# Patient Record
Sex: FEMALE | Race: BLACK OR AFRICAN AMERICAN | NOT HISPANIC OR LATINO | Employment: OTHER | URBAN - METROPOLITAN AREA
[De-identification: names, ages, dates, MRNs, and addresses within clinical notes are randomized per-mention and may not be internally consistent; named-entity substitution may affect disease eponyms.]

---

## 2017-01-09 ENCOUNTER — TELEPHONE (OUTPATIENT)
Dept: FAMILY MEDICINE CLINIC | Facility: CLINIC | Age: 62
End: 2017-01-09

## 2017-01-09 RX ORDER — VALACYCLOVIR HYDROCHLORIDE 1 G/1
1000 TABLET, FILM COATED ORAL 2 TIMES DAILY
Qty: 30 TABLET | Refills: 1 | Status: SHIPPED | OUTPATIENT
Start: 2017-01-09 | End: 2018-02-05 | Stop reason: SDUPTHER

## 2017-01-09 NOTE — TELEPHONE ENCOUNTER
----- Message from Uriah Ramirez MD sent at 1/9/2017 11:01 AM EST -----  Contact: [phone]  Medicine has been ordered  ----- Message -----     From: Oneyda See MA     Sent: 1/9/2017  10:59 AM       To: Uriah Ramirez MD        ----- Message -----     From: Radhika Gant     Sent: 1/9/2017  10:31 AM       To: Oneyda See MA    PT NEEDS REFILL OF ACYCLOEIR FOR FEVER BLISTERS. PLEASE CALL INTO [facility] AT [facility]    informed pt rx was sent to the pharmacy

## 2017-03-21 RX ORDER — MELOXICAM 15 MG/1
TABLET ORAL
Qty: 30 TABLET | Refills: 0 | Status: SHIPPED | OUTPATIENT
Start: 2017-03-21 | End: 2017-08-14 | Stop reason: SDUPTHER

## 2017-05-31 RX ORDER — ONDANSETRON 4 MG/1
TABLET, ORALLY DISINTEGRATING ORAL
Qty: 12 TABLET | Refills: 0 | Status: SHIPPED | OUTPATIENT
Start: 2017-05-31 | End: 2017-06-13

## 2017-06-05 ENCOUNTER — OFFICE VISIT (OUTPATIENT)
Dept: FAMILY MEDICINE CLINIC | Facility: CLINIC | Age: 62
End: 2017-06-05

## 2017-06-05 VITALS
WEIGHT: 185 LBS | HEIGHT: 67 IN | OXYGEN SATURATION: 98 % | TEMPERATURE: 97.9 F | BODY MASS INDEX: 29.03 KG/M2 | HEART RATE: 81 BPM | SYSTOLIC BLOOD PRESSURE: 118 MMHG | DIASTOLIC BLOOD PRESSURE: 82 MMHG

## 2017-06-05 DIAGNOSIS — R11.0 NAUSEA: ICD-10-CM

## 2017-06-05 DIAGNOSIS — R10.9 ABDOMINAL PAIN, UNSPECIFIED LOCATION: Primary | ICD-10-CM

## 2017-06-05 PROCEDURE — 99213 OFFICE O/P EST LOW 20 MIN: CPT | Performed by: FAMILY MEDICINE

## 2017-06-05 NOTE — PROGRESS NOTES
SUBJECTIVE:  The patient is a 62-year-old Afro-American female who is here primarily because she's having some abdominal comfort at her surgery site for a hernia.  She'll have occasional nausea and vomiting precipitated by smells.  She continues to have this.  This has been an ongoing problem for the recent onset of abdominal discomfort.  No melena or hematochezia.     PAST MEDICAL HISTORY:  Reviewed.    REVIEW OF SYSTEMS:  Please see above; 14 point ROS otherwise negative.      OBJECTIVE: Vitals signs are reviewed and are stable.    HEENT: PERRLA.    Neck:  Supple.    Lungs:  Clear.    Heart:  Regular rate and rhythm.    Abdomen:   Soft, normal tenderness is present at the lower end of the incision.  I'm not certain I detect a bulge though due to the patient's body habitus I cannot be sure of this.  Bowel sounds are present.  Extremities:  No cyanosis, clubbing or edema.      ASSESSMENT:    Possible incisional hernia    PLAN:  The patient's old records are not available from 2012.  I will try to retrieve these in the next 24 hours and figure out who her surgeon was since she does not remember.  She'll then be referred to a surgeon.  She will increase her Zofran from 4 mg 8 mg when she needs it for smells that precipitate her nausea and vomiting.  She'll let me know if this helps.  She is advised to go the emergency room if any problems.

## 2017-06-13 ENCOUNTER — OFFICE VISIT (OUTPATIENT)
Dept: SURGERY | Facility: CLINIC | Age: 62
End: 2017-06-13

## 2017-06-13 VITALS — WEIGHT: 184.6 LBS | HEART RATE: 97 BPM | BODY MASS INDEX: 27.34 KG/M2 | HEIGHT: 69 IN | OXYGEN SATURATION: 98 %

## 2017-06-13 DIAGNOSIS — K43.9 ABDOMINAL WALL HERNIA: Primary | ICD-10-CM

## 2017-06-13 PROCEDURE — 99203 OFFICE O/P NEW LOW 30 MIN: CPT | Performed by: SURGERY

## 2017-06-13 NOTE — PROGRESS NOTES
Date: 2017   Patient Name: Carly De   Medical Record #: [id number]   : 1955  Age: 62 y.o.  Sex: female      Referring MD:  Uriah Ramirez MD  [address] [place] [address]    Reason for Visit  Chief Complaint   Patient presents with   • Abdominal Pain   • Constipation   • Diarrhea   • Nausea   • Vomiting        History of Present Illness:     Carly De is a 62 y.o. female who presents because of abdominal pain that happened last week.  The patient reports developing sudden onset of abdominal pain.  The pain was located in the mid aspect of the abdomen and the prior incision.  The pain was squeezing in nature and he was 7 out of 10 in intensity.  The the pain was intermittent and would come and go.  This lasted for approximately 3 days.  This was associated with nausea and several episodes of nonbloody or bilious vomiting.  She reports feeling no masses.  The pain has resolved since then.  She has been tolerating diet without any problems.  She feels that the wound bulges at times.  She reports passing gas and having regular bowel movements.  She denies any obstructive symptoms.  She had prior epigastric hernia repair with ventralex mesh medium size circular patch by Dr. Parks in .  Denies any other complaint    Past Medical History    Patient Active Problem List   Diagnosis   • Abnormal weight gain   • Lymphocytic thyroiditis   • Hoarseness   • Hypothyroidism   • Sleep apnea   • Disorder of thyroid   • Vitamin D deficiency     Past Surgical History    Past Surgical History:   Procedure Laterality Date   • COLONOSCOPY N/A    • COLONOSCOPY N/A     pt reports no polyps, [address]   • HAND SURGERY     • HERNIA REPAIR N/A 2012    Open repair of incarcerated epigastric ventral hernia w/ Ventralex mesh; Dr. Bandar Parks   • TEMPORAL ARTERY BIOPSY Left 2011    Dr. Ines Russell   • WRIST SURGERY       Allergies    No Known  "Allergies    Medications  Current Outpatient Prescriptions   Medication Sig Dispense Refill   • estradiol (CLIMARA) 0.05 MG/24HR patch Place 1 patch on the skin 1 (One) Time Per Week.     • levothyroxine (SYNTHROID, LEVOTHROID) 75 MCG tablet Take 1 tablet by mouth Daily. 90 tablet 1   • medroxyPROGESTERone (PROVERA) 10 MG tablet Take 10 mg by mouth.     • meloxicam (MOBIC) 15 MG tablet TAKE 1 TABLET BY MOUTH DAILY 30 tablet 0   • valACYclovir (VALTREX) 1000 MG tablet Take 1 tablet by mouth 2 (Two) Times a Day. 30 tablet 1     No current facility-administered medications for this visit.          Social History  Social History     Social History   • Marital status:      Spouse name: N/A   • Number of children: N/A   • Years of education: N/A     Social History Main Topics   • Smoking status: Never Smoker   • Smokeless tobacco: None   • Alcohol use Yes      Comment: SOCIAL   • Drug use: No   • Sexual activity: Not Asked     Other Topics Concern   • None     Social History Narrative       Family History  Family History   Problem Relation Age of Onset   • Hypertension Mother    • Diabetes Mother          Review of Systems      REVIEW OF SYSTEMS      CONSTITUTIONAL: Denies fevers, chills, unintentional weight loss or weight gain  HEENT: Reports nosebleeds and sinus pressure  RESPIRATORY: Denies chronic cough or sob. Reports sleep apnea   CARDIAC: Denies chest pain, palpitations, edema  GI: Denies dyspepsia, reflux, heartburn, diarrhea or constipation  : Denies dysuria or hematuria.    MUSCULOSKELETAL: Denies muscle weakness and pain   NEURO: Reports headaches denies dizziness or seizures  ENDOCRINE: Denies significant heat or cold intolerance or history of thyroid problems.    DERM: no rashes,lesions or discharge.     Physical Exam  Patient is a 62 y.o. female who appeared   Pulse 97  Ht 69\" (175.3 cm)  Wt 184 lb 9.6 oz (83.7 kg)  SpO2 98%  BMI 27.26 kg/m2  Body mass index is 27.26 kg/(m^2).  General: " Well-appearing, in no distress  HEENT: normochephalic, atraumatic, no scleral icterus. Moist oral mucosa.   Lungs: clear to auscultation and percussion, no chest deformities noted.  Cardiovascular:  Regular rate and rhythm  Extremities: No clubbing cyanosis or edema  Skin: No rashes, moist and warm.   Neuro: alert and oriented, normal speech, cranial nerves 2-12 grossly intact, no focal deficits   Abdominal exam: soft,nondistended, no masses or organomegaly.  She has a well-healed supraumbilical incision.  She has a partially reducible incisional hernia in the mid left aspect of her incision.  There is mild tenderness over the area.  There is no skin color changes.  The defect is approximately 2 x 2 centimeters and round    Impression:  This is a 62 y.o. female patient with a chief complaint of abdominal pain that was found to have an incisional hernia.  At this moment there is no signs of bowel obstruction or bowel compromise.  Risks and benefits of conservative, laparoscopic, an open approach have been discussed in length and at detail with the patient. After carefully considering those risks and benefits, being given an opportunity to further ask questions, and voicing understanding,  the patient has chosen to undergo laparoscopic incisional hernia repair with possible insertion of mesh.     Plan:  - CT scan abdomen without contrast to better assess abdominal wall defect and status of mesh  - Will plan for laparoscopic incisional hernia repair with mesh  - I will call the patient went results of CT scan are back    Rene Patrick MD  General, Minimally Invasive and Endoscopic Surgery  [facility]    [address], [address]  [place], [place], [address]  P: [phone]  F: [phone]

## 2017-06-22 ENCOUNTER — HOSPITAL ENCOUNTER (OUTPATIENT)
Dept: CT IMAGING | Facility: HOSPITAL | Age: 62
Discharge: HOME | End: 2017-06-22
Attending: SURGERY | Admitting: SURGERY

## 2017-06-22 PROCEDURE — 74150 CT ABDOMEN W/O CONTRAST: CPT

## 2017-06-23 ENCOUNTER — TELEPHONE (OUTPATIENT)
Dept: SURGERY | Facility: CLINIC | Age: 62
End: 2017-06-23

## 2017-06-23 RX ORDER — LEVOFLOXACIN 500 MG/1
500 TABLET, FILM COATED ORAL DAILY
Qty: 14 TABLET | Refills: 0 | Status: SHIPPED | OUTPATIENT
Start: 2017-06-23 | End: 2017-07-07

## 2017-06-23 RX ORDER — METRONIDAZOLE 500 MG/1
500 TABLET ORAL 3 TIMES DAILY
Qty: 42 TABLET | Refills: 0 | Status: SHIPPED | OUTPATIENT
Start: 2017-06-23 | End: 2017-06-30

## 2017-06-23 NOTE — TELEPHONE ENCOUNTER
Called patient regarding the CT results She has small incisional hernia and localized diverticulitis. Patient has pain at hernia site, not LLQ. I ordered antibiotics (levo-flagyl) for 15 days, she should follow up in my office after antibiotic treatment is complete for incisional hernia repair.

## 2017-06-30 ENCOUNTER — OFFICE VISIT (OUTPATIENT)
Dept: ENDOCRINOLOGY | Age: 62
End: 2017-06-30

## 2017-06-30 VITALS
OXYGEN SATURATION: 96 % | SYSTOLIC BLOOD PRESSURE: 140 MMHG | HEIGHT: 67 IN | HEART RATE: 80 BPM | BODY MASS INDEX: 29.19 KG/M2 | WEIGHT: 186 LBS | DIASTOLIC BLOOD PRESSURE: 102 MMHG

## 2017-06-30 DIAGNOSIS — R63.5 ABNORMAL WEIGHT GAIN: ICD-10-CM

## 2017-06-30 DIAGNOSIS — E06.3 LYMPHOCYTIC THYROIDITIS: ICD-10-CM

## 2017-06-30 DIAGNOSIS — E55.9 VITAMIN D DEFICIENCY: ICD-10-CM

## 2017-06-30 DIAGNOSIS — E03.8 OTHER SPECIFIED HYPOTHYROIDISM: Primary | ICD-10-CM

## 2017-06-30 LAB
25(OH)D3+25(OH)D2 SERPL-MCNC: 33.9 NG/ML (ref 30–100)
ALBUMIN SERPL-MCNC: 4.4 G/DL (ref 3.5–5.2)
ALBUMIN/GLOB SERPL: 1.6 G/DL
ALP SERPL-CCNC: 68 U/L (ref 39–117)
ALT SERPL-CCNC: 12 U/L (ref 1–33)
AST SERPL-CCNC: 27 U/L (ref 1–32)
BILIRUB SERPL-MCNC: 0.6 MG/DL (ref 0.1–1.2)
BUN SERPL-MCNC: 15 MG/DL (ref 8–23)
BUN/CREAT SERPL: 19.5 (ref 7–25)
CALCIUM SERPL-MCNC: 9.4 MG/DL (ref 8.6–10.5)
CHLORIDE SERPL-SCNC: 97 MMOL/L (ref 98–107)
CO2 SERPL-SCNC: 26.3 MMOL/L (ref 22–29)
CREAT SERPL-MCNC: 0.77 MG/DL (ref 0.57–1)
GLOBULIN SER CALC-MCNC: 2.7 GM/DL
GLUCOSE SERPL-MCNC: 99 MG/DL (ref 65–99)
POTASSIUM SERPL-SCNC: 4.3 MMOL/L (ref 3.5–5.2)
PROT SERPL-MCNC: 7.1 G/DL (ref 6–8.5)
SODIUM SERPL-SCNC: 137 MMOL/L (ref 136–145)
T4 FREE SERPL-MCNC: 1.06 NG/DL (ref 0.93–1.7)
TSH SERPL DL<=0.005 MIU/L-ACNC: 1.86 MIU/ML (ref 0.27–4.2)

## 2017-06-30 PROCEDURE — 99214 OFFICE O/P EST MOD 30 MIN: CPT | Performed by: INTERNAL MEDICINE

## 2017-07-01 DIAGNOSIS — E03.8 OTHER SPECIFIED HYPOTHYROIDISM: ICD-10-CM

## 2017-07-03 RX ORDER — LEVOTHYROXINE SODIUM 0.07 MG/1
TABLET ORAL
Qty: 90 TABLET | Refills: 0 | Status: ON HOLD | OUTPATIENT
Start: 2017-07-03 | End: 2017-10-31 | Stop reason: SDUPTHER

## 2017-08-14 RX ORDER — MELOXICAM 15 MG/1
TABLET ORAL
Qty: 30 TABLET | Refills: 0 | Status: SHIPPED | OUTPATIENT
Start: 2017-08-14 | End: 2017-09-12 | Stop reason: SDUPTHER

## 2017-08-21 ENCOUNTER — OFFICE VISIT (OUTPATIENT)
Dept: FAMILY MEDICINE CLINIC | Facility: CLINIC | Age: 62
End: 2017-08-21

## 2017-08-21 VITALS
SYSTOLIC BLOOD PRESSURE: 100 MMHG | OXYGEN SATURATION: 99 % | BODY MASS INDEX: 29.07 KG/M2 | TEMPERATURE: 98.3 F | HEIGHT: 67 IN | DIASTOLIC BLOOD PRESSURE: 72 MMHG | HEART RATE: 83 BPM | RESPIRATION RATE: 16 BRPM | WEIGHT: 185.2 LBS

## 2017-08-21 DIAGNOSIS — R04.0 EPISTAXIS, RECURRENT: Primary | ICD-10-CM

## 2017-08-21 PROCEDURE — 99213 OFFICE O/P EST LOW 20 MIN: CPT | Performed by: NURSE PRACTITIONER

## 2017-08-21 RX ORDER — ESTRADIOL 0.05 MG/D
1 PATCH TRANSDERMAL WEEKLY
COMMUNITY
Start: 2017-08-14 | End: 2020-07-10

## 2017-08-21 NOTE — PATIENT INSTRUCTIONS
Refer to ENT for possible cauterization, will call with appt.  Cont ocean spray, may use vasoline to nasal area for moisure.  Educated patient on process to stop bleed, sit up, lean forward, pinch nose when bleeding begins.   Avoid picking or putting anything in nose.   May use humidifier at night.   Increase fluid intake, get plenty of rest.   Patient agrees with plan of care and understands instructions. Call if worsening symptoms or any problems or concerns.

## 2017-08-21 NOTE — PROGRESS NOTES
Subjective   Carly De is a 62 y.o. female.     History of Present Illness     {Common H&P Review Areas:06783}    Review of Systems    Objective   Physical Exam    Assessment/Plan   {Assess/PlanSmartLinks:20913}

## 2017-08-21 NOTE — PROGRESS NOTES
Subjective   Carly De is a 62 y.o. female.     History of Present Illness   C/o nose bleeds, she has had this for about 1 1/2 week daily, sometimes BID, she has had this happen before, she has had to have her nose cauterized before, she does not see ENT, she does have some sinus pressure and HA, she uses ocean spray, she states the nose bleeds last about 20 min, she states she stuffed her nose with tissue until it stopped, she states she sometimes touches nose and it starts bleeding.     The following portions of the patient's history were reviewed and updated as appropriate: allergies, current medications, past family history, past medical history, past social history, past surgical history and problem list.    Review of Systems   Constitutional: Negative for chills, diaphoresis and fever.   HENT: Positive for nosebleeds and sinus pressure. Negative for ear pain, postnasal drip, rhinorrhea and sore throat.    Eyes: Negative for pain.   Respiratory: Negative for cough and shortness of breath.    Cardiovascular: Negative for chest pain.   Musculoskeletal: Negative for arthralgias and myalgias.   Allergic/Immunologic: Positive for environmental allergies.   Neurological: Positive for headaches. Negative for dizziness and light-headedness.   All other systems reviewed and are negative.      Objective   Physical Exam   Constitutional: She is oriented to person, place, and time. She appears well-developed and well-nourished.   HENT:   Head: Normocephalic.   Right Ear: Tympanic membrane and ear canal normal.   Left Ear: Tympanic membrane and ear canal normal.   Nose: Mucosal edema present. No nose lacerations. No epistaxis.  No foreign bodies.   Mouth/Throat: Uvula is midline, oropharynx is clear and moist and mucous membranes are normal.   Eyes: Pupils are equal, round, and reactive to light.   Neck: Neck supple.   Cardiovascular: Normal rate, regular rhythm and normal heart sounds.    Pulmonary/Chest: Effort  normal and breath sounds normal.   Musculoskeletal: Normal range of motion.   Lymphadenopathy:     She has no cervical adenopathy.   Neurological: She is alert and oriented to person, place, and time.   Skin: Skin is warm and dry.   Psychiatric: She has a normal mood and affect. Her behavior is normal. Judgment and thought content normal.   Nursing note and vitals reviewed.      Assessment/Plan   Carly was seen today for nose bleed.    Diagnoses and all orders for this visit:    Epistaxis, recurrent  -     Ambulatory Referral to ENT (Otolaryngology)      Refer to ENT for possible cauterization, will call with appt.  Cont ocean spray, may use vasoline to nasal area for moisure.  Educated patient on process to stop bleed, sit up, lean forward, pinch nose when bleeding begins.   Avoid picking or putting anything in nose.   May use humidifier at night.   Increase fluid intake, get plenty of rest.   Patient agrees with plan of care and understands instructions. Call if worsening symptoms or any problems or concerns.

## 2017-09-12 RX ORDER — MELOXICAM 15 MG/1
TABLET ORAL
Qty: 30 TABLET | Refills: 0 | Status: SHIPPED | OUTPATIENT
Start: 2017-09-12 | End: 2017-12-11

## 2017-09-20 ENCOUNTER — OFFICE VISIT (OUTPATIENT)
Dept: FAMILY MEDICINE CLINIC | Facility: CLINIC | Age: 62
End: 2017-09-20

## 2017-09-20 VITALS
WEIGHT: 184.4 LBS | OXYGEN SATURATION: 98 % | HEART RATE: 88 BPM | HEIGHT: 67 IN | BODY MASS INDEX: 28.94 KG/M2 | DIASTOLIC BLOOD PRESSURE: 90 MMHG | SYSTOLIC BLOOD PRESSURE: 150 MMHG | TEMPERATURE: 98.5 F

## 2017-09-20 DIAGNOSIS — N30.01 ACUTE CYSTITIS WITH HEMATURIA: Primary | ICD-10-CM

## 2017-09-20 DIAGNOSIS — Z12.11 ENCOUNTER FOR SCREENING COLONOSCOPY: ICD-10-CM

## 2017-09-20 LAB
BACTERIA UR QL AUTO: ABNORMAL /HPF
BILIRUB UR QL STRIP: NEGATIVE
CLARITY UR: CLEAR
COLOR UR: YELLOW
GLUCOSE UR STRIP-MCNC: NEGATIVE MG/DL
HGB UR QL STRIP.AUTO: ABNORMAL
KETONES UR QL STRIP: NEGATIVE
LEUKOCYTE ESTERASE UR QL STRIP.AUTO: ABNORMAL
NITRITE UR QL STRIP: NEGATIVE
PH UR STRIP.AUTO: 7 [PH] (ref 4.6–8)
PROT UR QL STRIP: ABNORMAL
RBC # UR: ABNORMAL /HPF
REF LAB TEST METHOD: ABNORMAL
SP GR UR STRIP: 1.02 (ref 1–1.03)
SQUAMOUS #/AREA URNS HPF: ABNORMAL /HPF
UROBILINOGEN UR QL STRIP: ABNORMAL
WBC UR QL AUTO: ABNORMAL /HPF

## 2017-09-20 PROCEDURE — 99213 OFFICE O/P EST LOW 20 MIN: CPT | Performed by: NURSE PRACTITIONER

## 2017-09-20 PROCEDURE — 81001 URINALYSIS AUTO W/SCOPE: CPT | Performed by: NURSE PRACTITIONER

## 2017-09-20 RX ORDER — CIPROFLOXACIN 500 MG/1
500 TABLET, FILM COATED ORAL 2 TIMES DAILY
Qty: 14 TABLET | Refills: 0 | Status: ON HOLD | OUTPATIENT
Start: 2017-09-20 | End: 2017-10-31

## 2017-09-20 NOTE — PROGRESS NOTES
Subjective   Carly De is a 62 y.o. female.     History of Present Illness   C/o urinary frequency, started about 1 week ago, she denies dysuria, denies bleeding or d/c, she denies back pain and fever. She has a feeling of incomplete emptying at times.   The following portions of the patient's history were reviewed and updated as appropriate: allergies, current medications, past family history, past medical history, past social history, past surgical history and problem list.    Review of Systems   Constitutional: Negative for chills, diaphoresis and fever.   Respiratory: Negative for shortness of breath.    Cardiovascular: Negative for chest pain.   Genitourinary: Positive for decreased urine volume and frequency. Negative for dysuria, flank pain, hematuria, menstrual problem, pelvic pain, urgency, vaginal bleeding and vaginal discharge.   Musculoskeletal: Negative for arthralgias, back pain and myalgias.   Neurological: Negative for dizziness, light-headedness and headaches.   All other systems reviewed and are negative.      Objective   Physical Exam   Constitutional: She is oriented to person, place, and time. She appears well-developed and well-nourished.   HENT:   Head: Normocephalic.   Eyes: Pupils are equal, round, and reactive to light.   Neck: Neck supple.   Cardiovascular: Normal rate, regular rhythm and normal heart sounds.    Pulmonary/Chest: Effort normal and breath sounds normal.   Abdominal: There is no CVA tenderness.   Musculoskeletal: Normal range of motion.   Neurological: She is alert and oriented to person, place, and time.   Skin: Skin is warm and dry.   Psychiatric: She has a normal mood and affect. Her behavior is normal. Judgment and thought content normal.   Nursing note and vitals reviewed.      Assessment/Plan   Carly was seen today for urine frequency.    Diagnoses and all orders for this visit:    Acute cystitis with hematuria  -     Urinalysis with microscope  -      Urinalysis  -     Urinalysis, Microscopic Only  -     Urinalysis With Microscopic; Future    Encounter for screening colonoscopy  -     Ambulatory Referral For Screening Colonoscopy    Other orders  -     ciprofloxacin (CIPRO) 500 MG tablet; Take 1 tablet by mouth 2 (Two) Times a Day.    UA today.  Start cipro 500mg 2x day for 7 days take with food.  Drink plenty of H2O, wipe front to back after urination.   Avoid bladder irritants- coffee, caffeine, carbonation.  Repeat UA in 2 weeks or f/u if symptoms persist,   Increase fluid intake, get plenty of rest.   Elevated BP, monitor at home, if cont >140/90 will consider Bp med.   Patient agrees with plan of care and understands instructions. Call if worsening symptoms or any problems or concerns.

## 2017-09-20 NOTE — PATIENT INSTRUCTIONS
UA today.  Start cipro 500mg 2x day for 7 days take with food.  Drink plenty of H2O, wipe front to back after urination.   Avoid bladder irritants- coffee, caffeine, carbonation.  Repeat UA in 2 weeks or f/u if symptoms persist,   Increase fluid intake, get plenty of rest.   Patient agrees with plan of care and understands instructions. Call if worsening symptoms or any problems or concerns.

## 2017-10-02 DIAGNOSIS — E03.8 OTHER SPECIFIED HYPOTHYROIDISM: ICD-10-CM

## 2017-10-03 RX ORDER — LEVOTHYROXINE SODIUM 0.07 MG/1
TABLET ORAL
Qty: 90 TABLET | Refills: 0 | Status: SHIPPED | OUTPATIENT
Start: 2017-10-03 | End: 2017-12-11

## 2017-10-04 ENCOUNTER — PREP FOR SURGERY (OUTPATIENT)
Dept: OTHER | Facility: HOSPITAL | Age: 62
End: 2017-10-04

## 2017-10-04 DIAGNOSIS — Z12.11 COLON CANCER SCREENING: Primary | ICD-10-CM

## 2017-10-05 PROBLEM — Z12.11 COLON CANCER SCREENING: Status: ACTIVE | Noted: 2017-10-05

## 2017-10-06 ENCOUNTER — LAB (OUTPATIENT)
Dept: FAMILY MEDICINE CLINIC | Facility: CLINIC | Age: 62
End: 2017-10-06

## 2017-10-06 ENCOUNTER — TELEPHONE (OUTPATIENT)
Dept: FAMILY MEDICINE CLINIC | Facility: CLINIC | Age: 62
End: 2017-10-06

## 2017-10-06 DIAGNOSIS — N30.01 ACUTE CYSTITIS WITH HEMATURIA: ICD-10-CM

## 2017-10-06 DIAGNOSIS — N30.01 ACUTE CYSTITIS WITH HEMATURIA: Primary | ICD-10-CM

## 2017-10-06 LAB
BACTERIA UR QL AUTO: ABNORMAL /HPF
BILIRUB UR QL STRIP: NEGATIVE
CLARITY UR: CLEAR
COLOR UR: YELLOW
GLUCOSE UR STRIP-MCNC: NEGATIVE MG/DL
HGB UR QL STRIP.AUTO: ABNORMAL
KETONES UR QL STRIP: NEGATIVE
LEUKOCYTE ESTERASE UR QL STRIP.AUTO: NEGATIVE
NITRITE UR QL STRIP: NEGATIVE
PH UR STRIP.AUTO: 7 [PH] (ref 4.6–8)
PROT UR QL STRIP: NEGATIVE
RBC # UR: ABNORMAL /HPF
REF LAB TEST METHOD: ABNORMAL
SP GR UR STRIP: 1.02 (ref 1–1.03)
SQUAMOUS #/AREA URNS HPF: ABNORMAL /HPF
UROBILINOGEN UR QL STRIP: ABNORMAL
WBC UR QL AUTO: ABNORMAL /HPF

## 2017-10-06 PROCEDURE — 81001 URINALYSIS AUTO W/SCOPE: CPT | Performed by: NURSE PRACTITIONER

## 2017-10-06 PROCEDURE — 87086 URINE CULTURE/COLONY COUNT: CPT | Performed by: NURSE PRACTITIONER

## 2017-10-06 NOTE — TELEPHONE ENCOUNTER
[facility]  ---- Message from JANUARY Montiel sent at 10/6/2017  2:38 PM EDT -----  Please call patient with results. Is she still having symptoms? UA improved but still hematuria present, will send for culture and call with results.

## 2017-10-08 LAB
BACTERIA SPEC AEROBE CULT: NORMAL
BACTERIA SPEC AEROBE CULT: NORMAL

## 2017-10-09 ENCOUNTER — TELEPHONE (OUTPATIENT)
Dept: FAMILY MEDICINE CLINIC | Facility: CLINIC | Age: 62
End: 2017-10-09

## 2017-10-09 NOTE — TELEPHONE ENCOUNTER
----- Message from JANUARY Montiel sent at 10/9/2017  8:10 AM EDT -----  Please call patient with results. Urine culture shows no infection. F/u if symptoms return.    Pt informed of lab results

## 2017-10-31 ENCOUNTER — ANESTHESIA (OUTPATIENT)
Dept: GASTROENTEROLOGY | Facility: HOSPITAL | Age: 62
End: 2017-10-31

## 2017-10-31 ENCOUNTER — HOSPITAL ENCOUNTER (OUTPATIENT)
Facility: HOSPITAL | Age: 62
Setting detail: HOSPITAL OUTPATIENT SURGERY
Discharge: HOME | End: 2017-10-31
Attending: SURGERY | Admitting: SURGERY

## 2017-10-31 ENCOUNTER — ANESTHESIA EVENT (OUTPATIENT)
Dept: GASTROENTEROLOGY | Facility: HOSPITAL | Age: 62
End: 2017-10-31

## 2017-10-31 VITALS
WEIGHT: 183.4 LBS | TEMPERATURE: 97.6 F | RESPIRATION RATE: 16 BRPM | HEART RATE: 80 BPM | HEIGHT: 67 IN | BODY MASS INDEX: 28.79 KG/M2 | DIASTOLIC BLOOD PRESSURE: 66 MMHG | OXYGEN SATURATION: 97 % | SYSTOLIC BLOOD PRESSURE: 131 MMHG

## 2017-10-31 DIAGNOSIS — Z12.11 COLON CANCER SCREENING: ICD-10-CM

## 2017-10-31 PROCEDURE — S0260 H&P FOR SURGERY: HCPCS | Performed by: SURGERY

## 2017-10-31 PROCEDURE — 88305 TISSUE EXAM BY PATHOLOGIST: CPT | Performed by: SURGERY

## 2017-10-31 PROCEDURE — 25010000002 PROPOFOL 1000 MG/ML EMULSION: Performed by: ANESTHESIOLOGY

## 2017-10-31 PROCEDURE — 25010000002 PROPOFOL 10 MG/ML EMULSION: Performed by: ANESTHESIOLOGY

## 2017-10-31 RX ORDER — LIDOCAINE HYDROCHLORIDE 20 MG/ML
INJECTION, SOLUTION INFILTRATION; PERINEURAL AS NEEDED
Status: DISCONTINUED | OUTPATIENT
Start: 2017-10-31 | End: 2017-10-31 | Stop reason: SURG

## 2017-10-31 RX ORDER — PROPOFOL 10 MG/ML
VIAL (ML) INTRAVENOUS AS NEEDED
Status: DISCONTINUED | OUTPATIENT
Start: 2017-10-31 | End: 2017-10-31 | Stop reason: SURG

## 2017-10-31 RX ORDER — SODIUM CHLORIDE 0.9 % (FLUSH) 0.9 %
1-10 SYRINGE (ML) INJECTION AS NEEDED
Status: DISCONTINUED | OUTPATIENT
Start: 2017-10-31 | End: 2017-10-31 | Stop reason: HOSPADM

## 2017-10-31 RX ORDER — SODIUM CHLORIDE, SODIUM LACTATE, POTASSIUM CHLORIDE, CALCIUM CHLORIDE 600; 310; 30; 20 MG/100ML; MG/100ML; MG/100ML; MG/100ML
30 INJECTION, SOLUTION INTRAVENOUS CONTINUOUS PRN
Status: DISCONTINUED | OUTPATIENT
Start: 2017-10-31 | End: 2017-10-31 | Stop reason: HOSPADM

## 2017-10-31 RX ADMIN — SODIUM CHLORIDE, POTASSIUM CHLORIDE, SODIUM LACTATE AND CALCIUM CHLORIDE 30 ML/HR: 600; 310; 30; 20 INJECTION, SOLUTION INTRAVENOUS at 07:51

## 2017-10-31 RX ADMIN — PROPOFOL 140 MCG/KG/MIN: 10 INJECTION, EMULSION INTRAVENOUS at 08:05

## 2017-10-31 RX ADMIN — LIDOCAINE HYDROCHLORIDE 50 MG: 20 INJECTION, SOLUTION INFILTRATION; PERINEURAL at 08:05

## 2017-10-31 RX ADMIN — PROPOFOL 160 MG: 10 INJECTION, EMULSION INTRAVENOUS at 08:05

## 2017-10-31 NOTE — ANESTHESIA POSTPROCEDURE EVALUATION
"Patient: Carly De    Procedure Summary     Date Anesthesia Start Anesthesia Stop Room / Location    10/31/17 0800 0824  [facility] /  [facility]       Procedure Diagnosis Surgeon Provider    COLONOSCOPY to [facility] (N/A ) Colon cancer screening  (Colon cancer screening [Z12.11]) MD Lillie Allison MD          Anesthesia Type: MAC  Last vitals  BP   102/64 (10/31/17 0828)   Temp   36.4 °C (97.6 °F) (10/31/17 0744)   Pulse   82 (10/31/17 0828)   Resp   16 (10/31/17 0828)     SpO2   98 % (10/31/17 0828)     Post Anesthesia Care and Evaluation    Patient location during evaluation: bedside  Patient participation: complete - patient participated  Level of consciousness: awake and alert  Pain management: adequate  Airway patency: patent  Anesthetic complications: No anesthetic complications  PONV Status: none  Cardiovascular status: acceptable  Respiratory status: acceptable  Hydration status: acceptable    Comments: /64 (BP Location: Left arm)  Pulse 82  Temp 36.4 °C (97.6 °F) (Oral)   Resp 16  Ht 67\" (170.2 cm)  Wt 183 lb 6.4 oz (83.2 kg)  SpO2 98%  BMI 28.72 kg/m2        "

## 2017-10-31 NOTE — ANESTHESIA PREPROCEDURE EVALUATION
Anesthesia Evaluation     Patient summary reviewed   NPO Solid Status: > 8 hours  NPO Liquid Status: > 4 hours     Airway   Mallampati: II  TM distance: >3 FB  Dental      Pulmonary    (+) sleep apnea,   Cardiovascular     Rhythm: regular  Rate: normal        Neuro/Psych  (+) headaches,    GI/Hepatic/Renal/Endo    (+)  hypothyroidism,     Musculoskeletal     Abdominal    Substance History      OB/GYN          Other   (+) arthritis                                     Anesthesia Plan    ASA 2     MAC   total IV anesthesia  Anesthetic plan and risks discussed with patient.

## 2017-11-01 LAB
CYTO UR: NORMAL
LAB AP CASE REPORT: NORMAL
Lab: NORMAL
PATH REPORT.FINAL DX SPEC: NORMAL
PATH REPORT.GROSS SPEC: NORMAL

## 2017-11-02 ENCOUNTER — TELEPHONE (OUTPATIENT)
Dept: SURGERY | Facility: CLINIC | Age: 62
End: 2017-11-02

## 2017-11-02 NOTE — TELEPHONE ENCOUNTER
I called the patient to discuss colonoscopy results and future hernia repair.  Left a message for the patient to call back

## 2017-11-06 ENCOUNTER — TELEPHONE (OUTPATIENT)
Dept: SURGERY | Facility: CLINIC | Age: 62
End: 2017-11-06

## 2017-11-07 ENCOUNTER — TELEPHONE (OUTPATIENT)
Dept: SURGERY | Facility: CLINIC | Age: 62
End: 2017-11-07

## 2017-11-07 NOTE — TELEPHONE ENCOUNTER
Call the patient regarding her colonoscopy results.  She will need to have a colonoscopy in 10 years for hyperplastic polyps.  I discussed with her about possible hernia repair and give her 2 options that would be an open repair that would fix the defects and likely not place another mesh.  This may not be as durable repair but will be less invasive.  A second option there is a 1 I recommend is that she undergoes robotic umbilical hernia repair with prior mesh removal and new mesh placement.  Risk and benefits of the procedure were explained to the patient she has decided to call me back or to come to the office for further discussion    Rene Patrick MD  General, Minimally Invasive and Endoscopic Surgery  [facility]    [address], [address]  [place], [place], [address]  P: [phone]  F: [phone]

## 2017-11-07 NOTE — TELEPHONE ENCOUNTER
Patient called and reached our answering service on 11/6 @ 4:44pm. States she was returning your call.

## 2017-11-21 ENCOUNTER — OFFICE VISIT (OUTPATIENT)
Dept: SURGERY | Facility: CLINIC | Age: 62
End: 2017-11-21

## 2017-11-21 VITALS — HEIGHT: 67 IN | BODY MASS INDEX: 29.41 KG/M2 | WEIGHT: 187.4 LBS | HEART RATE: 95 BPM | OXYGEN SATURATION: 96 %

## 2017-11-21 DIAGNOSIS — K43.2 INCISIONAL HERNIA, WITHOUT OBSTRUCTION OR GANGRENE: Primary | ICD-10-CM

## 2017-11-21 PROCEDURE — 99214 OFFICE O/P EST MOD 30 MIN: CPT | Performed by: SURGERY

## 2017-11-21 RX ORDER — METHYLPREDNISOLONE 4 MG/1
TABLET ORAL
COMMUNITY
Start: 2017-11-18 | End: 2017-11-21

## 2017-11-21 RX ORDER — CYCLOBENZAPRINE HCL 5 MG
5 TABLET ORAL 2 TIMES DAILY PRN
COMMUNITY
Start: 2017-11-18 | End: 2017-11-29

## 2017-11-21 RX ORDER — CEFAZOLIN SODIUM 2 G/100ML
2 INJECTION, SOLUTION INTRAVENOUS ONCE
Status: CANCELLED | OUTPATIENT
Start: 2017-12-14 | End: 2017-11-21

## 2017-11-21 NOTE — PROGRESS NOTES
Cc: Follow-up for hernia surgery    HPI: The patient is a very pleasant 62-year-old female that I have seen the beginning of June this year because of abdominal pain.  She was having periumbilical abdominal pain that was sharp and severe in intensity.  This lasted for approximately 3 days.  The pain since then have resolved.  I ordered a CT scan of the abdomen and pelvis that show recurrent incisional hernia and diverticulitis.  She underwent full course of antibiotics.  After this she underwent screening colonoscopy for screening and she was find to have diverticulosis, she has been on a high-fiber diet.  She is here today to discuss for possible hernia repair    Past Medical History         Patient Active Problem List   Diagnosis   • Abnormal weight gain   • Lymphocytic thyroiditis   • Hoarseness   • Hypothyroidism   • Sleep apnea   • Vitamin D deficiency      Past Surgical History      Surgical History          Past Surgical History:   Procedure Laterality Date   • COLONOSCOPY N/A 2005   • COLONOSCOPY N/A 2010     pt reports no polyps, [address]   • HAND SURGERY       • HERNIA REPAIR N/A 01/04/2012     Open repair of incarcerated epigastric ventral hernia w/ Ventralex mesh; Dr. Bandar Parks   • TEMPORAL ARTERY BIOPSY Left 09/16/2011     Dr. Ines Russell   • WRIST SURGERY             Allergies     No Known Allergies     Medications   •  estradiol (CLIMARA) 0.05 MG/24HR patch, Place 1 patch on the skin 1 (One) Time Per Week., Disp: , Rfl:   •  levothyroxine (SYNTHROID, LEVOTHROID) 75 MCG tablet, TAKE 1 TABLET BY MOUTH EVERY DAY, Disp: 90 tablet, Rfl: 0  •  medroxyPROGESTERone (PROVERA) 10 MG tablet, Take 10 mg by mouth Daily., Disp: , Rfl:   •  meloxicam (MOBIC) 15 MG tablet, TAKE 1 TABLET BY MOUTH DAILY, Disp: 30 tablet, Rfl: 0     Social History   Social History    Social History            Social History   • Marital status:        Spouse name: N/A   • Number of children: N/A   • Years of education:  N/A              Social History Main Topics    • Smoking status: Never Smoker    • Smokeless tobacco: None    • Alcohol use Yes         Comment: SOCIAL    • Drug use: No    • Sexual activity: Not Asked            Other Topics Concern   • None      Social History Narrative            Family History        Family History   Problem Relation Age of Onset   • Hypertension Mother     • Diabetes Mother        Review of Systems       REVIEW OF SYSTEMS                                           CONSTITUTIONAL: Denies fevers, chills, unintentional weight loss or weight gain  HEENT: Reports nosebleeds and sinus pressure  RESPIRATORY: Denies chronic cough or sob. Reports sleep apnea   CARDIAC: Denies chest pain, palpitations, edema  GI: Denies dyspepsia, reflux, heartburn, diarrhea or constipation  : Denies dysuria or hematuria.    MUSCULOSKELETAL: Denies muscle weakness and pain   NEURO: Reports headaches denies dizziness or seizures  ENDOCRINE: Denies significant heat or cold intolerance or history of thyroid problems.    DERM: no rashes,lesions or discharge.      Physical Exam  Body mass index is 27.26 kg/(m^2).  General: Well-appearing, in no distress  HEENT: normochephalic, atraumatic, no scleral icterus. Moist oral mucosa.   Lungs: clear to auscultation and percussion, no chest deformities noted.  Cardiovascular:  Regular rate and rhythm  Extremities: No clubbing cyanosis or edema  Skin: No rashes, moist and warm.   Neuro: alert and oriented, normal speech, cranial nerves 2-12 grossly intact, no focal deficits   Abdominal exam: soft,nondistended, no masses or organomegaly.  She has a well-healed supraumbilical incision.  She has a partially reducible incisional hernia in the mid left aspect of her incision.  There is no tenderness over the area.  There is no skin color changes.    CT abdomen and pelvis (6/13/17): IMPRESSIONS: Small anterior abdominal wall hernia within the left side  of the rectus sheath superior to the  "umbilicus that contains only  omental fat. There is mild colonic diverticulosis and also evidence of  mild localized diverticulitis involving a single diverticulum within the  distal left colon. There is no abscess or free air.      Pathology:   Final Diagnosis   1: \"ASCENDING COLON ABNORMAL MUCOSA\":                         HYPERPLASTIC POLYP, FRAGMENTS.     2: \"ABNORMAL MUCOSA RECTUM\":                         HYPERPLASTIC POLYP.     Impression:  This is a 62 y.o. female patient with a chief complaint of abdominal pain that was found to have an incisional hernia.  At this moment there is no signs of bowel obstruction or bowel compromise.  Risks and benefits of conservative, laparoscopic, robotic assisted laparoscopic and open approach have been discussed in length and at detail with the patient. After carefully considering those risks and benefits including bleeding, infection, perforation of the intra-abdominal organs, hernia recurrence, mesh infection and the risk of anesthesia , being given an opportunity to further ask questions, and voicing understanding,  the patient has chosen to undergo robotic assisted laparoscopic incisional hernia repair with insertion of mesh and possible removal of old mesh    - Surgery scheduling started  - Plan for robotic-assisted laparoscopic incisional hernia repair with insertion of mesh and possible old mesh removal  - Colonoscopy in 10 years for screening purposes    The patient verbalized understanding and agree with the plan    Rene Patrick MD  General, Minimally Invasive and Endoscopic Surgery  [facility]    [address], [address]  [place], [place], [facility]  P: [phone]  F: [phone]     "

## 2017-12-11 ENCOUNTER — APPOINTMENT (OUTPATIENT)
Dept: PREADMISSION TESTING | Facility: HOSPITAL | Age: 62
End: 2017-12-11

## 2017-12-11 VITALS
RESPIRATION RATE: 16 BRPM | TEMPERATURE: 97.1 F | BODY MASS INDEX: 29.51 KG/M2 | SYSTOLIC BLOOD PRESSURE: 132 MMHG | HEART RATE: 83 BPM | OXYGEN SATURATION: 97 % | WEIGHT: 188 LBS | DIASTOLIC BLOOD PRESSURE: 91 MMHG | HEIGHT: 67 IN

## 2017-12-11 DIAGNOSIS — K43.2 INCISIONAL HERNIA, WITHOUT OBSTRUCTION OR GANGRENE: ICD-10-CM

## 2017-12-11 LAB
ABO GROUP BLD: NORMAL
ANION GAP SERPL CALCULATED.3IONS-SCNC: 11.1 MMOL/L
BLD GP AB SCN SERPL QL: NEGATIVE
BUN BLD-MCNC: 13 MG/DL (ref 8–23)
BUN/CREAT SERPL: 17.1 (ref 7–25)
CALCIUM SPEC-SCNC: 9.6 MG/DL (ref 8.6–10.5)
CHLORIDE SERPL-SCNC: 101 MMOL/L (ref 98–107)
CO2 SERPL-SCNC: 26.9 MMOL/L (ref 22–29)
CREAT BLD-MCNC: 0.76 MG/DL (ref 0.57–1)
DEPRECATED RDW RBC AUTO: 43.3 FL (ref 37–54)
ERYTHROCYTE [DISTWIDTH] IN BLOOD BY AUTOMATED COUNT: 14.2 % (ref 11.7–13)
GFR SERPL CREATININE-BSD FRML MDRD: 93 ML/MIN/1.73
GLUCOSE BLD-MCNC: 106 MG/DL (ref 65–99)
HCT VFR BLD AUTO: 40.6 % (ref 35.6–45.5)
HGB BLD-MCNC: 12.8 G/DL (ref 11.9–15.5)
MCH RBC QN AUTO: 26.3 PG (ref 26.9–32)
MCHC RBC AUTO-ENTMCNC: 31.5 G/DL (ref 32.4–36.3)
MCV RBC AUTO: 83.4 FL (ref 80.5–98.2)
PLATELET # BLD AUTO: 280 10*3/MM3 (ref 140–500)
PMV BLD AUTO: 10.2 FL (ref 6–12)
POTASSIUM BLD-SCNC: 4.1 MMOL/L (ref 3.5–5.2)
RBC # BLD AUTO: 4.87 10*6/MM3 (ref 3.9–5.2)
RH BLD: POSITIVE
SODIUM BLD-SCNC: 139 MMOL/L (ref 136–145)
WBC NRBC COR # BLD: 4.2 10*3/MM3 (ref 4.5–10.7)

## 2017-12-11 PROCEDURE — 80048 BASIC METABOLIC PNL TOTAL CA: CPT | Performed by: SURGERY

## 2017-12-11 PROCEDURE — 86901 BLOOD TYPING SEROLOGIC RH(D): CPT | Performed by: SURGERY

## 2017-12-11 PROCEDURE — 85027 COMPLETE CBC AUTOMATED: CPT | Performed by: SURGERY

## 2017-12-11 PROCEDURE — 86900 BLOOD TYPING SEROLOGIC ABO: CPT | Performed by: SURGERY

## 2017-12-11 PROCEDURE — 93005 ELECTROCARDIOGRAM TRACING: CPT

## 2017-12-11 PROCEDURE — 86850 RBC ANTIBODY SCREEN: CPT | Performed by: SURGERY

## 2017-12-11 PROCEDURE — 36415 COLL VENOUS BLD VENIPUNCTURE: CPT | Performed by: SURGERY

## 2017-12-11 PROCEDURE — 93010 ELECTROCARDIOGRAM REPORT: CPT | Performed by: INTERNAL MEDICINE

## 2017-12-11 RX ORDER — ASCORBIC ACID 500 MG
500 TABLET ORAL DAILY
COMMUNITY
End: 2021-10-29

## 2017-12-11 RX ORDER — LEVOTHYROXINE SODIUM 0.07 MG/1
75 TABLET ORAL DAILY
COMMUNITY
End: 2018-01-08 | Stop reason: SDUPTHER

## 2017-12-11 RX ORDER — MELOXICAM 15 MG/1
15 TABLET ORAL DAILY PRN
COMMUNITY
End: 2021-10-29

## 2017-12-11 RX ORDER — CHOLECALCIFEROL (VITAMIN D3) 50 MCG
2000 TABLET ORAL DAILY
COMMUNITY

## 2017-12-11 NOTE — DISCHARGE INSTRUCTIONS
Take the following medications the morning of surgery with a small sip of water:  NONE    ARRIVE TO MAIN OR AT 10:30 A.M.      General Instructions:  • Do not eat solid food after midnight the night before surgery.  • You may drink clear liquids day of surgery but must stop at least one hour before your hospital arrival time.  • It is beneficial for you to have a clear drink that contains carbohydrates the day of surgery.  We suggest a 12 to 20 ounce bottle of Gatorade or Powerade for non-diabetic patients or a 12 to 20 ounce bottle of G2 or Powerade Zero for diabetic patients. (Pediatric patients, are not advised to drink a 12 to 20 ounce carbohydrate drink)    Clear liquids are liquids you can see through.  Nothing red in color.     Plain water                               Sports drinks  Sodas                                   Gelatin (Jell-O)  Fruit juices without pulp such as white grape juice and apple juice  Popsicles that contain no fruit or yogurt  Tea or coffee (no cream or milk added)  Gatorade / Powerade  G2 / Powerade Zero    • Infants may have breast milk up to four hours before surgery.  • Infants drinking formula may drink formula up to six hours before surgery.   • Patients who avoid smoking, chewing tobacco and alcohol for 4 weeks prior to surgery have a reduced risk of post-operative complications.  Quit smoking as many days before surgery as you can.  • Do not smoke, use chewing tobacco or drink alcohol the day of surgery.   • If applicable bring your C-PAP/ BI-PAP machine.  • Bring any papers given to you in the doctor’s office.  • Wear clean comfortable clothes and socks.  • Do not wear contact lenses or make-up.  Bring a case for your glasses.   • Bring crutches or walker if applicable.  • Remove all piercings.  Leave jewelry and any other valuables at home.  • The Pre-Admission Testing nurse will instruct you to bring medications if unable to obtain an accurate list in Pre-Admission Testing.         If you were given a blood bank ID arm band remember to bring it with you the day of surgery.    Preventing a Surgical Site Infection:  • For 2 to 3 days before surgery, avoid shaving with a razor because the razor can irritate skin and make it easier to develop an infection.  • The night prior to surgery sleep in a clean bed with clean clothing.  Do not allow pets to sleep with you.  • Shower on the morning of surgery using a fresh bar of anti-bacterial soap (such as Dial) and clean washcloth.  Dry with a clean towel and dress in clean clothing.  • Ask your surgeon if you will be receiving antibiotics prior to surgery.  • Make sure you, your family, and all healthcare providers clean their hands with soap and water or an alcohol based hand  before caring for you or your wound.    Day of surgery:  Upon arrival, a Pre-op nurse and Anesthesiologist will review your health history, obtain vital signs, and answer questions you may have.  The only belongings needed at this time will be your home medications and if applicable your C-PAP/BI-PAP machine.  If you are staying overnight your family can leave the rest of your belongings in the car and bring them to your room later.  A Pre-op nurse will start an IV and you may receive medication in preparation for surgery, including something to help you relax.  Your family will be able to see you in the Pre-op area.  While you are in surgery your family should notify the waiting room  if they leave the waiting room area and provide a contact phone number.    Please be aware that surgery does come with discomfort.  We want to make every effort to control your discomfort so please discuss any uncontrolled symptoms with your nurse.   Your doctor will most likely have prescribed pain medications.      If you are going home after surgery you will receive individualized written care instructions before being discharged.  A responsible adult must drive you to  and from the hospital on the day of your surgery and stay with you for 24 hours.    If you are staying overnight following surgery, you will be transported to your hospital room following the recovery period.  [facility] has all private rooms.    If you have any questions please call Pre-Admission Testing at [phone].  Deductibles and co-payments are collected on the day of service. Please be prepared to pay the required co-pay, deductible or deposit on the day of service as defined by your plan.

## 2017-12-14 ENCOUNTER — ANESTHESIA (OUTPATIENT)
Dept: PERIOP | Facility: HOSPITAL | Age: 62
End: 2017-12-14

## 2017-12-14 ENCOUNTER — HOSPITAL ENCOUNTER (OUTPATIENT)
Facility: HOSPITAL | Age: 62
Setting detail: HOSPITAL OUTPATIENT SURGERY
Discharge: HOME | End: 2017-12-14
Attending: SURGERY | Admitting: SURGERY

## 2017-12-14 ENCOUNTER — ANESTHESIA EVENT (OUTPATIENT)
Dept: PERIOP | Facility: HOSPITAL | Age: 62
End: 2017-12-14

## 2017-12-14 VITALS
OXYGEN SATURATION: 98 % | WEIGHT: 187.8 LBS | SYSTOLIC BLOOD PRESSURE: 132 MMHG | RESPIRATION RATE: 16 BRPM | HEIGHT: 67 IN | TEMPERATURE: 98.1 F | DIASTOLIC BLOOD PRESSURE: 79 MMHG | HEART RATE: 83 BPM | BODY MASS INDEX: 29.47 KG/M2

## 2017-12-14 DIAGNOSIS — K43.2 INCISIONAL HERNIA, WITHOUT OBSTRUCTION OR GANGRENE: ICD-10-CM

## 2017-12-14 LAB
B-HCG UR QL: NEGATIVE
INTERNAL NEGATIVE CONTROL: NEGATIVE
INTERNAL POSITIVE CONTROL: POSITIVE
Lab: NORMAL

## 2017-12-14 PROCEDURE — 25010000002 MIDAZOLAM PER 1 MG: Performed by: ANESTHESIOLOGY

## 2017-12-14 PROCEDURE — S2900 ROBOTIC SURGICAL SYSTEM: HCPCS | Performed by: SURGERY

## 2017-12-14 PROCEDURE — 49652 PR LAP, VENTRAL HERNIA REPAIR,REDUCIBLE: CPT | Performed by: SPECIALIST/TECHNOLOGIST, OTHER

## 2017-12-14 PROCEDURE — 49657 PR LAP, RECURRENT INCISIONAL HERNIA REPAIR,INCARCERATED: CPT | Performed by: SPECIALIST/TECHNOLOGIST, OTHER

## 2017-12-14 PROCEDURE — 25010000002 HYDROMORPHONE PER 4 MG: Performed by: NURSE ANESTHETIST, CERTIFIED REGISTERED

## 2017-12-14 PROCEDURE — 25010000003 CEFAZOLIN IN DEXTROSE 2-4 GM/100ML-% SOLUTION: Performed by: SURGERY

## 2017-12-14 PROCEDURE — 49652 PR LAP, VENTRAL HERNIA REPAIR,REDUCIBLE: CPT | Performed by: SURGERY

## 2017-12-14 PROCEDURE — 27087 REMOVE HIP FOREIGN BODY: CPT | Performed by: SURGERY

## 2017-12-14 PROCEDURE — 25010000002 PROPOFOL 10 MG/ML EMULSION: Performed by: NURSE ANESTHETIST, CERTIFIED REGISTERED

## 2017-12-14 PROCEDURE — 25010000002 FENTANYL CITRATE (PF) 100 MCG/2ML SOLUTION: Performed by: NURSE ANESTHETIST, CERTIFIED REGISTERED

## 2017-12-14 PROCEDURE — C1781 MESH (IMPLANTABLE): HCPCS | Performed by: SURGERY

## 2017-12-14 PROCEDURE — 27087 REMOVE HIP FOREIGN BODY: CPT | Performed by: SPECIALIST/TECHNOLOGIST, OTHER

## 2017-12-14 PROCEDURE — 49657 PR LAP, RECURRENT INCISIONAL HERNIA REPAIR,INCARCERATED: CPT | Performed by: SURGERY

## 2017-12-14 PROCEDURE — 25010000002 DEXAMETHASONE PER 1 MG: Performed by: NURSE ANESTHETIST, CERTIFIED REGISTERED

## 2017-12-14 PROCEDURE — 25010000002 ONDANSETRON PER 1 MG: Performed by: NURSE ANESTHETIST, CERTIFIED REGISTERED

## 2017-12-14 DEVICE — VENTRALIGHT ST MESH WITH ECHO PS POSITONING SYSTEM
Type: IMPLANTABLE DEVICE | Site: ABDOMEN | Status: FUNCTIONAL
Brand: VENTRALIGHT ST MESH WITH ECHO PS POSITONING SYSTEM

## 2017-12-14 RX ORDER — ONDANSETRON 2 MG/ML
INJECTION INTRAMUSCULAR; INTRAVENOUS AS NEEDED
Status: DISCONTINUED | OUTPATIENT
Start: 2017-12-14 | End: 2017-12-14 | Stop reason: SURG

## 2017-12-14 RX ORDER — DOCUSATE SODIUM 100 MG/1
100 CAPSULE, LIQUID FILLED ORAL 2 TIMES DAILY PRN
Status: DISCONTINUED | OUTPATIENT
Start: 2017-12-14 | End: 2017-12-14 | Stop reason: HOSPADM

## 2017-12-14 RX ORDER — FENTANYL CITRATE 50 UG/ML
50 INJECTION, SOLUTION INTRAMUSCULAR; INTRAVENOUS
Status: DISCONTINUED | OUTPATIENT
Start: 2017-12-14 | End: 2017-12-14 | Stop reason: HOSPADM

## 2017-12-14 RX ORDER — SODIUM CHLORIDE, SODIUM LACTATE, POTASSIUM CHLORIDE, CALCIUM CHLORIDE 600; 310; 30; 20 MG/100ML; MG/100ML; MG/100ML; MG/100ML
INJECTION, SOLUTION INTRAVENOUS CONTINUOUS PRN
Status: DISCONTINUED | OUTPATIENT
Start: 2017-12-14 | End: 2017-12-14 | Stop reason: SURG

## 2017-12-14 RX ORDER — HYDROMORPHONE HCL 110MG/55ML
PATIENT CONTROLLED ANALGESIA SYRINGE INTRAVENOUS AS NEEDED
Status: DISCONTINUED | OUTPATIENT
Start: 2017-12-14 | End: 2017-12-14 | Stop reason: SURG

## 2017-12-14 RX ORDER — PROMETHAZINE HYDROCHLORIDE 25 MG/1
12.5 TABLET ORAL ONCE AS NEEDED
Status: DISCONTINUED | OUTPATIENT
Start: 2017-12-14 | End: 2017-12-14 | Stop reason: SDUPTHER

## 2017-12-14 RX ORDER — EPHEDRINE SULFATE 50 MG/ML
5 INJECTION, SOLUTION INTRAVENOUS ONCE AS NEEDED
Status: DISCONTINUED | OUTPATIENT
Start: 2017-12-14 | End: 2017-12-14 | Stop reason: HOSPADM

## 2017-12-14 RX ORDER — ACETAMINOPHEN, ASPIRIN AND CAFFEINE 250; 250; 65 MG/1; MG/1; MG/1
1 TABLET, FILM COATED ORAL EVERY 6 HOURS PRN
COMMUNITY

## 2017-12-14 RX ORDER — CEFAZOLIN SODIUM 2 G/100ML
2 INJECTION, SOLUTION INTRAVENOUS ONCE
Status: COMPLETED | OUTPATIENT
Start: 2017-12-14 | End: 2017-12-14

## 2017-12-14 RX ORDER — FENTANYL CITRATE 50 UG/ML
INJECTION, SOLUTION INTRAMUSCULAR; INTRAVENOUS AS NEEDED
Status: DISCONTINUED | OUTPATIENT
Start: 2017-12-14 | End: 2017-12-14 | Stop reason: SURG

## 2017-12-14 RX ORDER — SODIUM CHLORIDE, SODIUM LACTATE, POTASSIUM CHLORIDE, CALCIUM CHLORIDE 600; 310; 30; 20 MG/100ML; MG/100ML; MG/100ML; MG/100ML
9 INJECTION, SOLUTION INTRAVENOUS CONTINUOUS
Status: DISCONTINUED | OUTPATIENT
Start: 2017-12-14 | End: 2017-12-14 | Stop reason: HOSPADM

## 2017-12-14 RX ORDER — FLUMAZENIL 0.1 MG/ML
0.2 INJECTION INTRAVENOUS AS NEEDED
Status: DISCONTINUED | OUTPATIENT
Start: 2017-12-14 | End: 2017-12-14 | Stop reason: HOSPADM

## 2017-12-14 RX ORDER — OXYCODONE AND ACETAMINOPHEN 7.5; 325 MG/1; MG/1
1 TABLET ORAL ONCE AS NEEDED
Status: DISCONTINUED | OUTPATIENT
Start: 2017-12-14 | End: 2017-12-14 | Stop reason: HOSPADM

## 2017-12-14 RX ORDER — DIPHENHYDRAMINE HYDROCHLORIDE 50 MG/ML
12.5 INJECTION INTRAMUSCULAR; INTRAVENOUS
Status: DISCONTINUED | OUTPATIENT
Start: 2017-12-14 | End: 2017-12-14 | Stop reason: HOSPADM

## 2017-12-14 RX ORDER — LIDOCAINE HYDROCHLORIDE 20 MG/ML
INJECTION, SOLUTION INFILTRATION; PERINEURAL AS NEEDED
Status: DISCONTINUED | OUTPATIENT
Start: 2017-12-14 | End: 2017-12-14 | Stop reason: SURG

## 2017-12-14 RX ORDER — ROCURONIUM BROMIDE 10 MG/ML
INJECTION, SOLUTION INTRAVENOUS AS NEEDED
Status: DISCONTINUED | OUTPATIENT
Start: 2017-12-14 | End: 2017-12-14 | Stop reason: SURG

## 2017-12-14 RX ORDER — BUPIVACAINE HYDROCHLORIDE AND EPINEPHRINE 2.5; 5 MG/ML; UG/ML
INJECTION, SOLUTION INFILTRATION; PERINEURAL AS NEEDED
Status: DISCONTINUED | OUTPATIENT
Start: 2017-12-14 | End: 2017-12-14 | Stop reason: HOSPADM

## 2017-12-14 RX ORDER — PROMETHAZINE HYDROCHLORIDE 12.5 MG/1
12.5 TABLET ORAL EVERY 6 HOURS PRN
Qty: 10 TABLET | Refills: 0 | Status: SHIPPED | OUTPATIENT
Start: 2017-12-14 | End: 2017-12-26

## 2017-12-14 RX ORDER — PROMETHAZINE HYDROCHLORIDE 25 MG/ML
12.5 INJECTION, SOLUTION INTRAMUSCULAR; INTRAVENOUS ONCE AS NEEDED
Status: DISCONTINUED | OUTPATIENT
Start: 2017-12-14 | End: 2017-12-14 | Stop reason: HOSPADM

## 2017-12-14 RX ORDER — FAMOTIDINE 10 MG/ML
20 INJECTION, SOLUTION INTRAVENOUS ONCE
Status: COMPLETED | OUTPATIENT
Start: 2017-12-14 | End: 2017-12-14

## 2017-12-14 RX ORDER — PROMETHAZINE HYDROCHLORIDE 25 MG/1
12.5 TABLET ORAL ONCE AS NEEDED
Status: DISCONTINUED | OUTPATIENT
Start: 2017-12-14 | End: 2017-12-14 | Stop reason: HOSPADM

## 2017-12-14 RX ORDER — HYDRALAZINE HYDROCHLORIDE 20 MG/ML
5 INJECTION INTRAMUSCULAR; INTRAVENOUS
Status: DISCONTINUED | OUTPATIENT
Start: 2017-12-14 | End: 2017-12-14 | Stop reason: HOSPADM

## 2017-12-14 RX ORDER — MIDAZOLAM HYDROCHLORIDE 1 MG/ML
1 INJECTION INTRAMUSCULAR; INTRAVENOUS
Status: DISCONTINUED | OUTPATIENT
Start: 2017-12-14 | End: 2017-12-14 | Stop reason: HOSPADM

## 2017-12-14 RX ORDER — HYDROCODONE BITARTRATE AND ACETAMINOPHEN 7.5; 325 MG/1; MG/1
1 TABLET ORAL ONCE AS NEEDED
Status: COMPLETED | OUTPATIENT
Start: 2017-12-14 | End: 2017-12-14

## 2017-12-14 RX ORDER — HYDROMORPHONE HYDROCHLORIDE 1 MG/ML
0.5 INJECTION, SOLUTION INTRAMUSCULAR; INTRAVENOUS; SUBCUTANEOUS
Status: DISCONTINUED | OUTPATIENT
Start: 2017-12-14 | End: 2017-12-14 | Stop reason: HOSPADM

## 2017-12-14 RX ORDER — HYDROCODONE BITARTRATE AND ACETAMINOPHEN 5; 325 MG/1; MG/1
1 TABLET ORAL EVERY 4 HOURS PRN
Qty: 40 TABLET | Refills: 0 | Status: SHIPPED | OUTPATIENT
Start: 2017-12-14 | End: 2018-01-08

## 2017-12-14 RX ORDER — AMOXICILLIN 250 MG
1 CAPSULE ORAL DAILY
Qty: 30 TABLET | Refills: 1 | Status: SHIPPED | OUTPATIENT
Start: 2017-12-14 | End: 2018-01-08

## 2017-12-14 RX ORDER — MIDAZOLAM HYDROCHLORIDE 1 MG/ML
2 INJECTION INTRAMUSCULAR; INTRAVENOUS
Status: DISCONTINUED | OUTPATIENT
Start: 2017-12-14 | End: 2017-12-14 | Stop reason: HOSPADM

## 2017-12-14 RX ORDER — PROPOFOL 10 MG/ML
VIAL (ML) INTRAVENOUS AS NEEDED
Status: DISCONTINUED | OUTPATIENT
Start: 2017-12-14 | End: 2017-12-14 | Stop reason: SURG

## 2017-12-14 RX ORDER — PROMETHAZINE HYDROCHLORIDE 25 MG/1
25 TABLET ORAL ONCE AS NEEDED
Status: DISCONTINUED | OUTPATIENT
Start: 2017-12-14 | End: 2017-12-14 | Stop reason: HOSPADM

## 2017-12-14 RX ORDER — DEXAMETHASONE SODIUM PHOSPHATE 10 MG/ML
INJECTION INTRAMUSCULAR; INTRAVENOUS AS NEEDED
Status: DISCONTINUED | OUTPATIENT
Start: 2017-12-14 | End: 2017-12-14 | Stop reason: SURG

## 2017-12-14 RX ORDER — ONDANSETRON 2 MG/ML
4 INJECTION INTRAMUSCULAR; INTRAVENOUS ONCE AS NEEDED
Status: DISCONTINUED | OUTPATIENT
Start: 2017-12-14 | End: 2017-12-14 | Stop reason: HOSPADM

## 2017-12-14 RX ORDER — SODIUM CHLORIDE 0.9 % (FLUSH) 0.9 %
1-10 SYRINGE (ML) INJECTION AS NEEDED
Status: DISCONTINUED | OUTPATIENT
Start: 2017-12-14 | End: 2017-12-14 | Stop reason: HOSPADM

## 2017-12-14 RX ORDER — OXYCODONE AND ACETAMINOPHEN 7.5; 325 MG/1; MG/1
1 TABLET ORAL ONCE AS NEEDED
Status: DISCONTINUED | OUTPATIENT
Start: 2017-12-14 | End: 2017-12-14 | Stop reason: SDUPTHER

## 2017-12-14 RX ORDER — LABETALOL HYDROCHLORIDE 5 MG/ML
5 INJECTION, SOLUTION INTRAVENOUS
Status: DISCONTINUED | OUTPATIENT
Start: 2017-12-14 | End: 2017-12-14 | Stop reason: HOSPADM

## 2017-12-14 RX ORDER — SODIUM CHLORIDE 9 MG/ML
INJECTION, SOLUTION INTRAVENOUS AS NEEDED
Status: DISCONTINUED | OUTPATIENT
Start: 2017-12-14 | End: 2017-12-14 | Stop reason: HOSPADM

## 2017-12-14 RX ORDER — PROMETHAZINE HYDROCHLORIDE 25 MG/1
25 SUPPOSITORY RECTAL ONCE AS NEEDED
Status: DISCONTINUED | OUTPATIENT
Start: 2017-12-14 | End: 2017-12-14 | Stop reason: HOSPADM

## 2017-12-14 RX ORDER — NALOXONE HCL 0.4 MG/ML
0.2 VIAL (ML) INJECTION AS NEEDED
Status: DISCONTINUED | OUTPATIENT
Start: 2017-12-14 | End: 2017-12-14 | Stop reason: HOSPADM

## 2017-12-14 RX ADMIN — SODIUM CHLORIDE, POTASSIUM CHLORIDE, SODIUM LACTATE AND CALCIUM CHLORIDE: 600; 310; 30; 20 INJECTION, SOLUTION INTRAVENOUS at 12:39

## 2017-12-14 RX ADMIN — HYDROCODONE BITARTRATE AND ACETAMINOPHEN 1 TABLET: 7.5; 325 TABLET ORAL at 17:17

## 2017-12-14 RX ADMIN — FENTANYL CITRATE 50 MCG: 50 INJECTION INTRAMUSCULAR; INTRAVENOUS at 15:30

## 2017-12-14 RX ADMIN — HYDROMORPHONE HYDROCHLORIDE 0.5 MG: 2 INJECTION INTRAMUSCULAR; INTRAVENOUS; SUBCUTANEOUS at 13:18

## 2017-12-14 RX ADMIN — FENTANYL CITRATE 100 MCG: 50 INJECTION INTRAMUSCULAR; INTRAVENOUS at 12:45

## 2017-12-14 RX ADMIN — FAMOTIDINE 20 MG: 10 INJECTION, SOLUTION INTRAVENOUS at 11:48

## 2017-12-14 RX ADMIN — ROCURONIUM BROMIDE 10 MG: 10 INJECTION INTRAVENOUS at 13:40

## 2017-12-14 RX ADMIN — LIDOCAINE HYDROCHLORIDE 100 MG: 20 INJECTION, SOLUTION INFILTRATION; PERINEURAL at 12:45

## 2017-12-14 RX ADMIN — FENTANYL CITRATE 50 MCG: 50 INJECTION INTRAMUSCULAR; INTRAVENOUS at 16:49

## 2017-12-14 RX ADMIN — HYDROMORPHONE HYDROCHLORIDE 0.5 MG: 1 INJECTION, SOLUTION INTRAMUSCULAR; INTRAVENOUS; SUBCUTANEOUS at 16:42

## 2017-12-14 RX ADMIN — ONDANSETRON 4 MG: 2 INJECTION INTRAMUSCULAR; INTRAVENOUS at 15:30

## 2017-12-14 RX ADMIN — Medication 2 MG: at 11:48

## 2017-12-14 RX ADMIN — FENTANYL CITRATE 50 MCG: 50 INJECTION INTRAMUSCULAR; INTRAVENOUS at 16:40

## 2017-12-14 RX ADMIN — HYDROMORPHONE HYDROCHLORIDE 0.5 MG: 1 INJECTION, SOLUTION INTRAMUSCULAR; INTRAVENOUS; SUBCUTANEOUS at 16:54

## 2017-12-14 RX ADMIN — ROCURONIUM BROMIDE 10 MG: 10 INJECTION INTRAVENOUS at 14:47

## 2017-12-14 RX ADMIN — PROPOFOL 200 MG: 10 INJECTION, EMULSION INTRAVENOUS at 12:45

## 2017-12-14 RX ADMIN — FENTANYL CITRATE 50 MCG: 50 INJECTION INTRAMUSCULAR; INTRAVENOUS at 15:45

## 2017-12-14 RX ADMIN — HYDROMORPHONE HYDROCHLORIDE 0.5 MG: 1 INJECTION, SOLUTION INTRAMUSCULAR; INTRAVENOUS; SUBCUTANEOUS at 17:17

## 2017-12-14 RX ADMIN — HYDROMORPHONE HYDROCHLORIDE 0.5 MG: 2 INJECTION INTRAMUSCULAR; INTRAVENOUS; SUBCUTANEOUS at 14:18

## 2017-12-14 RX ADMIN — SUGAMMADEX 300 MG: 100 INJECTION, SOLUTION INTRAVENOUS at 16:04

## 2017-12-14 RX ADMIN — CEFAZOLIN SODIUM 2 G: 2 INJECTION, SOLUTION INTRAVENOUS at 12:45

## 2017-12-14 RX ADMIN — DEXAMETHASONE SODIUM PHOSPHATE 8 MG: 10 INJECTION INTRAMUSCULAR; INTRAVENOUS at 14:46

## 2017-12-14 RX ADMIN — ROCURONIUM BROMIDE 50 MG: 10 INJECTION INTRAVENOUS at 12:45

## 2017-12-14 RX ADMIN — SODIUM CHLORIDE, POTASSIUM CHLORIDE, SODIUM LACTATE AND CALCIUM CHLORIDE 9 ML/HR: 600; 310; 30; 20 INJECTION, SOLUTION INTRAVENOUS at 11:48

## 2017-12-14 RX ADMIN — SODIUM CHLORIDE, POTASSIUM CHLORIDE, SODIUM LACTATE AND CALCIUM CHLORIDE: 600; 310; 30; 20 INJECTION, SOLUTION INTRAVENOUS at 13:18

## 2017-12-14 NOTE — BRIEF OP NOTE
VENTRAL HERNIA REPAIR LAPAROSCOPIC WITH DAVINCI ROBOT  Progress Note    Carly Davisnkle  12/14/2017    Pre-op Diagnosis:   Incisional hernia, without obstruction or gangrene [K43.2]       Post-Op Diagnosis Codes:     * Incisional hernia, without obstruction or gangrene [K43.2]    Procedure/CPT® Codes:      Procedure(s):  ROBOTIC ASSISTED LAPAROSCOPIC RECURRENT INCISIONAL HERNIA REPAIR x2 WITH MESH, WITH REMOVAL OF OLD MESH     Surgeon(s):  Rene Patrick MD    Anesthesia: General    Staff:   Circulator: Vandana Snider RN; Pinky Lyn RN  Scrub Person: Lesa Calle; Judy Antonio; Beatriz Patel  Assistant: Luz Nazario CSA    Estimated Blood Loss: minimal    Urine Voided: 90 mL    Specimens:                Mesh, not send for patholgy      Drains:   Urethral Catheter 12/14/17 100% silicone 16 10 10 (Active)           Findings: Recurrent incisional hernia and umbilical hernia    Complications: None      Rene Patrick MD     Date: 12/14/2017  Time: 3:58 PM

## 2017-12-14 NOTE — PLAN OF CARE
Problem: Patient Care Overview (Adult)  Goal: Plan of Care Review  Outcome: Ongoing (interventions implemented as appropriate)    12/14/17 1134   Coping/Psychosocial Response Interventions   Plan Of Care Reviewed With patient   Patient Care Overview   Progress progress toward functional goals as expected       Goal: Adult Individualization and Mutuality  Outcome: Ongoing (interventions implemented as appropriate)    12/14/17 1134   Individualization   Patient Specific Preferences pt goes by Carly       Goal: Discharge Needs Assessment  Outcome: Ongoing (interventions implemented as appropriate)    12/14/17 1122 12/14/17 1134   Discharge Needs Assessment   Concerns To Be Addressed --  denies needs/concerns at this time   Living Environment   Transportation Available car;family or friend will provide --    Self-Care   Equipment Currently Used at Home none --          Problem: Perioperative Period (Adult)  Goal: Signs and Symptoms of Listed Potential Problems Will be Absent or Manageable (Perioperative Period)  Outcome: Ongoing (interventions implemented as appropriate)    12/14/17 1134   Perioperative Period   Problems Assessed (Perioperative Period) pain;perioperative injury;infection   Problems Present (Perioperative Period) none

## 2017-12-14 NOTE — ANESTHESIA PREPROCEDURE EVALUATION
Anesthesia Evaluation     Patient summary reviewed and Nursing notes reviewed   NPO Solid Status: > 8 hours  NPO Liquid Status: > 2 hours     Airway   Mallampati: II  TM distance: >3 FB  Neck ROM: full  no difficulty expected  Dental - normal exam     Pulmonary - normal exam   (+) a smoker Former, sleep apnea on CPAP,   Cardiovascular - normal exam        Neuro/Psych  (+) headaches,    GI/Hepatic/Renal/Endo    (+)  hypothyroidism,     Musculoskeletal     Abdominal  - normal exam   Substance History      OB/GYN          Other   (+) arthritis                                     Anesthesia Plan    ASA 2     general     intravenous induction   Anesthetic plan and risks discussed with patient.

## 2017-12-14 NOTE — ANESTHESIA POSTPROCEDURE EVALUATION
"Patient: Carly De    Procedure Summary     Date Anesthesia Start Anesthesia Stop Room / Location    12/14/17 9748 [id number]  [facility] / [facility]       Procedure Diagnosis Surgeon Provider    VENTRAL HERNIA REPAIR LAPAROSCOPIC WITH DAVINCI ROBOT ROBOTIC ASSISTED LAPAROSCOPIC RECURRENT INCISIONAL HERNIA REPAIR x2 WITH MESH, WITH REMOVAL OF OLD MESH  (N/A Abdomen) Incisional hernia, without obstruction or gangrene  (Incisional hernia, without obstruction or gangrene [K43.2]) MD Dejan Allison MD          Anesthesia Type: general  Last vitals  BP   141/90 (12/14/17 1745)   Temp   36.7 °C (98.1 °F) (12/14/17 1725)   Pulse   88 (12/14/17 1745)   Resp   16 (12/14/17 1745)     SpO2   97 % (12/14/17 1745)     Post Anesthesia Care and Evaluation    Patient location during evaluation: PACU  Patient participation: complete - patient participated  Level of consciousness: awake and alert  Pain management: adequate  Airway patency: patent  Anesthetic complications: No anesthetic complications    Cardiovascular status: acceptable  Respiratory status: acceptable  Hydration status: acceptable    Comments: /90  Pulse 88  Temp 36.7 °C (98.1 °F) (Oral)   Resp 16  Ht 170.2 cm (67\")  Wt 85.2 kg (187 lb 12.8 oz)  [facility] 11/23/2017  SpO2 97%  BMI 29.41 kg/m2      "

## 2017-12-14 NOTE — ANESTHESIA PROCEDURE NOTES
Airway  Urgency: elective    Date/Time: 12/14/2017 1:20 PM  End Time:12/14/2017 1:20 PM  Airway not difficult    General Information and Staff    Patient location during procedure: OR  Anesthesiologist: DAGOBERTO HARDY  CRNA: NORM HAILE    Indications and Patient Condition  Indications for airway management: airway protection    Preoxygenated: yes  MILS maintained throughout  Mask difficulty assessment: 1 - vent by mask    Final Airway Details  Final airway type: endotracheal airway      Successful airway: ETT  Cuffed: yes   Successful intubation technique: direct laryngoscopy  Endotracheal tube insertion site: oral  Blade: Serrano  Blade size: #2  ETT size: 7.0 mm  Cormack-Lehane Classification: grade I - full view of glottis  Placement verified by: chest auscultation and capnometry   Measured from: lips  Number of attempts at approach: 1    Additional Comments  Atraumatic, Secured after verification of placement

## 2017-12-14 NOTE — ANESTHESIA POSTPROCEDURE EVALUATION
"Patient: Carly De    Procedure Summary     Date Anesthesia Start Anesthesia Stop Room / Location    12/14/17 [id number]  [facility] / [facility]       Procedure Diagnosis Surgeon Provider    VENTRAL HERNIA REPAIR LAPAROSCOPIC WITH DAVINCI ROBOT ROBOTIC ASSISTED LAPAROSCOPIC RECURRENT INCISIONAL HERNIA REPAIR x2 WITH MESH, WITH REMOVAL OF OLD MESH  (N/A Abdomen) Incisional hernia, without obstruction or gangrene  (Incisional hernia, without obstruction or gangrene [K43.2]) MD Dejan Allison MD          Anesthesia Type: general  Last vitals  BP   141/90 (12/14/17 1745)   Temp   36.7 °C (98.1 °F) (12/14/17 1725)   Pulse   88 (12/14/17 1745)   Resp   16 (12/14/17 1745)     SpO2   97 % (12/14/17 1745)     Post Anesthesia Care and Evaluation    Patient location during evaluation: PACU  Patient participation: complete - patient participated  Level of consciousness: awake and alert  Pain management: adequate  Airway patency: patent  Anesthetic complications: No anesthetic complications    Cardiovascular status: acceptable  Respiratory status: acceptable  Hydration status: acceptable    Comments: /90  Pulse 88  Temp 36.7 °C (98.1 °F) (Oral)   Resp 16  Ht 170.2 cm (67\")  Wt 85.2 kg (187 lb 12.8 oz)  [facility] 11/23/2017  SpO2 97%  BMI 29.41 kg/m2      "

## 2017-12-14 NOTE — PLAN OF CARE
Problem: Patient Care Overview (Adult)  Goal: Plan of Care Review  Outcome: Outcome(s) achieved Date Met:  12/14/17  Goal: Adult Individualization and Mutuality  Outcome: Outcome(s) achieved Date Met:  12/14/17  Goal: Discharge Needs Assessment  Outcome: Outcome(s) achieved Date Met:  12/14/17    Problem: Perioperative Period (Adult)  Goal: Signs and Symptoms of Listed Potential Problems Will be Absent or Manageable (Perioperative Period)  Outcome: Outcome(s) achieved Date Met:  12/14/17

## 2017-12-14 NOTE — OP NOTE
Date: 12/14/17    Preoperative diagnosis: Recurrent incisional hernia.  Postoperative diagnosis: Recurrent incarcerated incisional hernia and umbilical hernia    Procedure performed: Robotic-assisted laparoscopic incisional epigastric and umbilical hernia repair with mesh placement and removal of old mesh.    Surgeon: Dr. Rene Patrick.  Asst.: EUDARD LUTZ    Anesthesia: Gen. endotracheal anesthesia.  EBL: Minimal.    Implants: 11.4 cm circular ventral light ST mesh    Findings: Recurrent incisional hernia with incarcerated fat with lateral failure of the mesh.  Umbilical hernia with incarcerated fat.    Complications: None    Indications: The patient is a very pleasant 62-year-old female that presented to my office with periumbilical abdominal pain.  On CT scan she was found to have a recurrence of a previously repair epigastric hernia.  She was symptomatic from it.  I have discussed with the patient about treatment options.  I offered the patient about a-assisted laparoscopic incisional hernia repair with mesh placement and removal of old mesh.  Risk and benefits of the procedure including bleeding, infection, hernia recurrence and mesh infection were explained to the patient.  She verbalized understanding and agree with the plan    Description of procedure:   - The patient was taken to the operative room and she was placed in the OR table in the supine position.  Preoperative antibiotics were given and SCDs were placed.  General endotracheal anesthesia was induced.  Kevin catheter was placed by nursing staff.  The patient abdomen was then prepped and draped in the usual sterile fashion.  Timeout was performed and the patient was correctly identified.    I then injected half percent Marcaine with epinephrine in the right paraumbilical area.  With scalpel an incision was performed.  With 5 mm trocar and Optiview technique the abdomen was accessed.  Pneumoperitoneum was insufflated.  Upon expiration of the  abdominal cavity there was no evidence of injury from trocar placement.  There was evidence of prior epigastric mesh placement with omental attachments.  I continued procedure by placing an 8 mm trocar in the left upper quadrant below the costal margin.  A 12 mm trocar was placed 5 cm below this one another 8 mm trocar was placed 5 cm below at the level of the anterior axillary line.  This was done under direct laparoscopic vision.  The robotic system was brought into the operative field.  The arms were connected to the trochars.  Camera was introduced and the patient abdomen and instruments were introduced under direct laparoscopic vision.  I started procedure by taking down the omental attachments to the prior placed epigastric mesh.  There was found to be a recurrent incisional hernia over the left lateral edge of the mesh.  There was incarcerated fat.  I reduced the incarcerated fat from the hernia defect.  I then proceeded to dissect the old mesh from the posterior abdominal wall.  This was performed with a combination of blunt dissection and electrocautery.  The mesh was completely removed.  I then reduced more incarcerated fat to the level of the umbilicus.  The falciform ligament was dissected from the abdominal wall.  There was evidence of 2 defects, one at the level of the umbilicus and the other at the epigastric area.  There was evidence of mild rectus muscle diastases.  I continue the procedure upon plication of the abdominal wall with running 0 statafix suture.  This was performed from the superior to the mid aspect of the abdominal wall.  This incorporated and sutured closed the epigastric defect.  Another suture was used from the inferior aspect of the abdominal wall just below the umbilical defect.  He was run all the way up to the falciform ligament plicating the abdominal wall and closing the hernia defect.  I then proceeded to remove the robotic system from the field and continue the procedure  laparoscopically.  A 11.4 cm circular ventral light ST mesh with echo system was introduced through the 12 mm trocar.  It was brought To the abdominal wall to cover the epigastric and umbilical defect.  This was done with at least 4 cm of overlap.  A 5 mm trocar was placed in the right upper quadrant.  The mesh was found to be laying flat.  With a double crown absorbable tackers the mesh was secured to the abdominal wall.  The mesh was then secured at 4. with 2-0 PDS sutures and Endo Close technique.  The mesh was found to be laying flat covering all the defects.  I then proceeded to inject a combination of half percent Marcaine with epinephrine and exparel at every tacker.  The old mesh was removed from the abdomen through the 12 mm trocar.  The 12 and the inferior a millimeter trocar were then removed and the defect was closed with interrupted 0 Vicryl suture and Endo Close technique.  Examination of the abdominal cavity was then performed.  There was no evidence of injury from trochars or instruments during the procedure.  On the 5 m trochars were removed under direct laparoscopic vision.  Pneumoperitoneum was desufflated.  All incisions were closed with 4-0 Monocryl and covered with Dermabond.  The patient was awakened in the operating room and she was taken to recovery area in stable condition instrument sponge counts were correct.    Rene Patrick MD, FACS  General, Minimally Invasive and Endoscopic Surgery  [facility]    [address] [place] [address], [address]  [place], [place], [address]  P: [phone]  F: [phone]

## 2017-12-14 NOTE — DISCHARGE INSTRUCTIONS
You had one [place] for pain at 5:17 pm.        Outpatient Surgery Guidelines, Adult  Outpatient procedures are those for which the person having the procedure is allowed to go home the same day as the procedure. Various procedures are done on an outpatient basis. You should follow some general guidelines if you will be having an outpatient procedure.  AFTER THE PROCEDURE  After surgery, you will be taken to a recovery area, where your progress will be monitored. If there are no complications, you will be allowed to go home when you are awake, stable, and taking fluids well. You may have numbness around the surgical site. Healing will take some time. You will have tenderness at the surgical site and may have some swelling and bruising. You may also have some nausea.  HOME CARE INSTRUCTIONS  · Do not drive for 24 hours, or as directed by your health care provider. Do not drive while taking prescription pain medicines.  · Do not drink alcohol for 24 hours.  · Do not make important decisions or sign legal documents for 24 hours.  · You may resume a normal diet and activities as directed.  · Do not lift anything heavier than 10 pounds (4.5 kg) or play contact sports until your health care provider says it is okay.  · Change your bandages (dressings) as directed.  · Only take over-the-counter or prescription medicines as directed by your health care provider.  · Follow up with your health care provider as directed.  SEEK MEDICAL CARE IF:  · You have increased bleeding (more than a small spot) from the surgical site.  · You have redness, swelling, or increasing pain in the wound.  · You see pus coming from the wound.  · You have a fever.  · You notice a bad smell coming from the wound or dressing.  · You feel lightheaded or faint.  · You develop a rash.  · You have trouble breathing.  · You develop allergies.  MAKE SURE YOU:  · Understand these instructions.  · Will watch your condition.  · Will get help right away if you  are not doing well or get worse.     This information is not intended to replace advice given to you by your health care provider. Make sure you discuss any questions you have with your health care provider.     Document Released: 09/12/2002 Document Revised: 05/03/2016 Document Reviewed: 05/22/2014

## 2017-12-14 NOTE — H&P (VIEW-ONLY)
Cc: Follow-up for hernia surgery    HPI: The patient is a very pleasant 62-year-old female that I have seen the beginning of June this year because of abdominal pain.  She was having periumbilical abdominal pain that was sharp and severe in intensity.  This lasted for approximately 3 days.  The pain since then have resolved.  I ordered a CT scan of the abdomen and pelvis that show recurrent incisional hernia and diverticulitis.  She underwent full course of antibiotics.  After this she underwent screening colonoscopy for screening and she was find to have diverticulosis, she has been on a high-fiber diet.  She is here today to discuss for possible hernia repair    Past Medical History         Patient Active Problem List   Diagnosis   • Abnormal weight gain   • Lymphocytic thyroiditis   • Hoarseness   • Hypothyroidism   • Sleep apnea   • Vitamin D deficiency      Past Surgical History      Surgical History          Past Surgical History:   Procedure Laterality Date   • COLONOSCOPY N/A 2005   • COLONOSCOPY N/A 2010     pt reports no polyps, [address]   • HAND SURGERY       • HERNIA REPAIR N/A 01/04/2012     Open repair of incarcerated epigastric ventral hernia w/ Ventralex mesh; Dr. Bandar Parks   • TEMPORAL ARTERY BIOPSY Left 09/16/2011     Dr. Ines Russell   • WRIST SURGERY             Allergies     No Known Allergies     Medications   •  estradiol (CLIMARA) 0.05 MG/24HR patch, Place 1 patch on the skin 1 (One) Time Per Week., Disp: , Rfl:   •  levothyroxine (SYNTHROID, LEVOTHROID) 75 MCG tablet, TAKE 1 TABLET BY MOUTH EVERY DAY, Disp: 90 tablet, Rfl: 0  •  medroxyPROGESTERone (PROVERA) 10 MG tablet, Take 10 mg by mouth Daily., Disp: , Rfl:   •  meloxicam (MOBIC) 15 MG tablet, TAKE 1 TABLET BY MOUTH DAILY, Disp: 30 tablet, Rfl: 0     Social History   Social History    Social History            Social History   • Marital status:        Spouse name: N/A   • Number of children: N/A   • Years of education:  N/A              Social History Main Topics    • Smoking status: Never Smoker    • Smokeless tobacco: None    • Alcohol use Yes         Comment: SOCIAL    • Drug use: No    • Sexual activity: Not Asked            Other Topics Concern   • None      Social History Narrative            Family History        Family History   Problem Relation Age of Onset   • Hypertension Mother     • Diabetes Mother        Review of Systems       REVIEW OF SYSTEMS                                           CONSTITUTIONAL: Denies fevers, chills, unintentional weight loss or weight gain  HEENT: Reports nosebleeds and sinus pressure  RESPIRATORY: Denies chronic cough or sob. Reports sleep apnea   CARDIAC: Denies chest pain, palpitations, edema  GI: Denies dyspepsia, reflux, heartburn, diarrhea or constipation  : Denies dysuria or hematuria.    MUSCULOSKELETAL: Denies muscle weakness and pain   NEURO: Reports headaches denies dizziness or seizures  ENDOCRINE: Denies significant heat or cold intolerance or history of thyroid problems.    DERM: no rashes,lesions or discharge.      Physical Exam  Body mass index is 27.26 kg/(m^2).  General: Well-appearing, in no distress  HEENT: normochephalic, atraumatic, no scleral icterus. Moist oral mucosa.   Lungs: clear to auscultation and percussion, no chest deformities noted.  Cardiovascular:  Regular rate and rhythm  Extremities: No clubbing cyanosis or edema  Skin: No rashes, moist and warm.   Neuro: alert and oriented, normal speech, cranial nerves 2-12 grossly intact, no focal deficits   Abdominal exam: soft,nondistended, no masses or organomegaly.  She has a well-healed supraumbilical incision.  She has a partially reducible incisional hernia in the mid left aspect of her incision.  There is no tenderness over the area.  There is no skin color changes.    CT abdomen and pelvis (6/13/17): IMPRESSIONS: Small anterior abdominal wall hernia within the left side  of the rectus sheath superior to the  "umbilicus that contains only  omental fat. There is mild colonic diverticulosis and also evidence of  mild localized diverticulitis involving a single diverticulum within the  distal left colon. There is no abscess or free air.      Pathology:   Final Diagnosis   1: \"ASCENDING COLON ABNORMAL MUCOSA\":                         HYPERPLASTIC POLYP, FRAGMENTS.     2: \"ABNORMAL MUCOSA RECTUM\":                         HYPERPLASTIC POLYP.     Impression:  This is a 62 y.o. female patient with a chief complaint of abdominal pain that was found to have an incisional hernia.  At this moment there is no signs of bowel obstruction or bowel compromise.  Risks and benefits of conservative, laparoscopic, robotic assisted laparoscopic and open approach have been discussed in length and at detail with the patient. After carefully considering those risks and benefits including bleeding, infection, perforation of the intra-abdominal organs, hernia recurrence, mesh infection and the risk of anesthesia , being given an opportunity to further ask questions, and voicing understanding,  the patient has chosen to undergo robotic assisted laparoscopic incisional hernia repair with insertion of mesh and possible removal of old mesh    - Surgery scheduling started  - Plan for robotic-assisted laparoscopic incisional hernia repair with insertion of mesh and possible old mesh removal  - Colonoscopy in 10 years for screening purposes    The patient verbalized understanding and agree with the plan    Rene Patrick MD  General, Minimally Invasive and Endoscopic Surgery  [facility]    [address], [address]  [place], [place], [facility]  P: [phone]  F: [phone]     "

## 2017-12-26 ENCOUNTER — OFFICE VISIT (OUTPATIENT)
Dept: SURGERY | Facility: CLINIC | Age: 62
End: 2017-12-26

## 2017-12-26 DIAGNOSIS — Z09 POSTOP CHECK: Primary | ICD-10-CM

## 2017-12-26 PROCEDURE — 99024 POSTOP FOLLOW-UP VISIT: CPT | Performed by: SURGERY

## 2017-12-26 NOTE — PROGRESS NOTES
Cc: Post-op check hernia surgery    S: Carly De is a 62 y.o. female patient here for follow up of her robotic assisted laparoscopic recurrent incisional hernia repair with mesh and mesh removal on 12/14/17.  The patient is doing well, and has no specific complaints other than mild numbness and pain at LLQ. Denies problems with incisions, fever, chills, nausea, vomiting, diarrhea or constipation.    O:   Wounds are healing well without signs of infection.  No current signs of recurrence.    Abdomen: soft, non tender.     A/P Patient s/p uncomplicated robotic assisted laparoscopic recurrent incisional hernia repair with mesh and mesh removal on 12/14/17. The patient is doing great and denies any major issues other than incisional pain and numbness.    She was instructed to continue with limited activity for the time being.      At 4 weeks she may resume light aerobic activity, followed by a trial of lifting heavier objects at 6 weeks.      Return to work on Jan 17, no restrictions    The patient was instructed to call with any difficulties.      Rene Patrikc MD  General, Minimally Invasive and Endoscopic Surgery  [facility]    [address], [address]  [place], [place], [address]  P: [phone]  F: [phone]

## 2018-01-01 DIAGNOSIS — E03.8 OTHER SPECIFIED HYPOTHYROIDISM: ICD-10-CM

## 2018-01-02 RX ORDER — LEVOTHYROXINE SODIUM 0.07 MG/1
TABLET ORAL
Qty: 90 TABLET | Refills: 0 | Status: SHIPPED | OUTPATIENT
Start: 2018-01-02 | End: 2018-07-09

## 2018-01-08 ENCOUNTER — OFFICE VISIT (OUTPATIENT)
Dept: ENDOCRINOLOGY | Age: 63
End: 2018-01-08

## 2018-01-08 VITALS
HEIGHT: 67 IN | WEIGHT: 184.6 LBS | HEART RATE: 88 BPM | DIASTOLIC BLOOD PRESSURE: 88 MMHG | SYSTOLIC BLOOD PRESSURE: 132 MMHG | BODY MASS INDEX: 28.97 KG/M2

## 2018-01-08 DIAGNOSIS — E55.9 VITAMIN D DEFICIENCY: ICD-10-CM

## 2018-01-08 DIAGNOSIS — E06.3 LYMPHOCYTIC THYROIDITIS: ICD-10-CM

## 2018-01-08 DIAGNOSIS — R63.5 ABNORMAL WEIGHT GAIN: ICD-10-CM

## 2018-01-08 DIAGNOSIS — E03.8 OTHER SPECIFIED HYPOTHYROIDISM: Primary | ICD-10-CM

## 2018-01-08 LAB
25(OH)D3+25(OH)D2 SERPL-MCNC: 36.1 NG/ML (ref 30–100)
ALBUMIN SERPL-MCNC: 4.8 G/DL (ref 3.5–5.2)
ALBUMIN/GLOB SERPL: 1.7 G/DL
ALP SERPL-CCNC: 88 U/L (ref 39–117)
ALT SERPL-CCNC: 15 U/L (ref 1–33)
AST SERPL-CCNC: 19 U/L (ref 1–32)
BILIRUB SERPL-MCNC: 0.6 MG/DL (ref 0.1–1.2)
BUN SERPL-MCNC: 14 MG/DL (ref 8–23)
BUN/CREAT SERPL: 16.1 (ref 7–25)
CALCIUM SERPL-MCNC: 9.9 MG/DL (ref 8.6–10.5)
CHLORIDE SERPL-SCNC: 99 MMOL/L (ref 98–107)
CO2 SERPL-SCNC: 30.2 MMOL/L (ref 22–29)
CREAT SERPL-MCNC: 0.87 MG/DL (ref 0.57–1)
GLOBULIN SER CALC-MCNC: 2.9 GM/DL
GLUCOSE SERPL-MCNC: 100 MG/DL (ref 65–99)
POTASSIUM SERPL-SCNC: 4.5 MMOL/L (ref 3.5–5.2)
PROT SERPL-MCNC: 7.7 G/DL (ref 6–8.5)
SODIUM SERPL-SCNC: 139 MMOL/L (ref 136–145)
T4 FREE SERPL-MCNC: 0.99 NG/DL (ref 0.93–1.7)
TSH SERPL DL<=0.005 MIU/L-ACNC: 8.25 MIU/ML (ref 0.27–4.2)

## 2018-01-08 PROCEDURE — 99214 OFFICE O/P EST MOD 30 MIN: CPT | Performed by: INTERNAL MEDICINE

## 2018-01-08 NOTE — PROGRESS NOTES
62 y.o.    Patient Care Team:  Uriah Ramirez MD as PCP - General    Chief Complaint:      F/U HYPOTHYROIDISM.  NO RECENT LABS.  Subjective     HPI     Patient is a 62-year-old female with a past history of hypothyroidism came for follow-up  Hypothyroidism  Patient is currently taking levothyroxine 75 µg daily.  She reports compliance with the medication and denies any side effects  She takes the medication on empty stomach in the morning and usually does not mix with any other medication.  Hashimoto's thyroiditis  Patient was positive for the antibodies.  She denies any rash or tenderness over the neck  Hoarseness of voice  Patient reports significant improvement in his symptom was the past several months  Hypertension  Patient is currently not on any medication  Vitamin D deficiency  Patient currently takes vitamin D3 daily 2000 units       Interval History      The patient is a 61-year-old postmenopausal female with history of hypothyroidism dating back to 2009.   She is currently on levothyroxine 75 mcg daily. She takes it in the morning on an empty stomach and will wait 30 minutes before eating her breakfast meal. She does not combine it with any calcium or iron or magnesium pills.  In regards to symptoms she does state that she feels tired and fatigued by the end of the day. She does have sleep apnea and is currently using the CPAP machine.  She denies bowel habit changes. No skin or hair or nail changes. Patient has been losing weight consistently for the past 1-1/2 years. She lost nearly 25 pounds since February 2014 . She has been less active due to the weather and is unable to walk outside. She started walking about 2-3 months every day and started losing weight.  Hoarseness of voice  Patient has been reporting hoarseness of voice for the past several months. She has been compliant with her medication.  Patient reported she has not been taking vitamin D or calcium since last visit. She reported that she  did have a bone density scan done approximately 2 years ago and was within normal range  Patient reports intermittent hot flashes. She also get fever blisters. She started walking a lot at least 2 miles daily and lost some weight since last year           Patient reported that she continues to experience occasional hoarseness of voice.  She also reported mild hair loss and fatigue. She denies any constipation.    The following portions of the patient's history were reviewed and updated as appropriate: allergies, current medications, past family history, past medical history, past social history, past surgical history and problem list.    Past Medical History:   Diagnosis Date   • Arthritis    • Bursitis     RIGHT HIP   • Colon polyp 10/31/2017    Ascending colon: hyperplastic polyp, rectum: hyperplastic polyp   • Fever blister     OCCASIONALLY   • Hashimoto's thyroiditis    • Hypothyroidism    • Migraine    • Sleep apnea     Compliant w/ Bi-Pap     Family History   Problem Relation Age of Onset   • Hypertension Mother    • Diabetes Mother    • Malig Hyperthermia Neg Hx      Social History     Social History   • Marital status:      Spouse name: N/A   • Number of children: N/A   • Years of education: N/A     Occupational History   • Not on file.     Social History Main Topics   • Smoking status: Former Smoker     Years: 5.00     Types: Cigarettes     Quit date: 1990   • Smokeless tobacco: Never Used   • Alcohol use Yes      Comment: SOCIAL   • Drug use: No   • Sexual activity: Defer     Other Topics Concern   • Not on file     Social History Narrative     No Known Allergies    Current Outpatient Prescriptions:   •  aspirin-acetaminophen-caffeine (EXCEDRIN MIGRAINE) 250-250-65 MG per tablet, Take 1 tablet by mouth Every 6 (Six) Hours As Needed for Headache., Disp: , Rfl:   •  Cholecalciferol (VITAMIN D) 2000 units tablet, Take 2,000 Units by mouth Daily., Disp: , Rfl:   •  estradiol (CLIMARA) 0.05 MG/24HR  "patch, Place 1 patch on the skin 1 (One) Time Per Week. fridays, Disp: , Rfl:   •  HYDROcodone-acetaminophen (NORCO) 5-325 MG per tablet, Take 1 tablet by mouth Every 4 (Four) Hours As Needed (Pain)., Disp: 40 tablet, Rfl: 0  •  levothyroxine (SYNTHROID, LEVOTHROID) 75 MCG tablet, Take 75 mcg by mouth Daily., Disp: , Rfl:   •  levothyroxine (SYNTHROID, LEVOTHROID) 75 MCG tablet, TAKE 1 TABLET BY MOUTH EVERY DAY, Disp: 90 tablet, Rfl: 0  •  medroxyPROGESTERone (PROVERA) 10 MG tablet, Take 10 mg by mouth Daily. Takes for one week after 12th week of estradiol patch., Disp: , Rfl:   •  meloxicam (MOBIC) 15 MG tablet, Take 15 mg by mouth Daily As Needed., Disp: , Rfl:   •  senna-docusate (PERICOLACE) 8.6-50 MG per tablet, Take 1 tablet by mouth Daily., Disp: 30 tablet, Rfl: 1  •  valACYclovir (VALTREX) 1000 MG tablet, Take 1 tablet by mouth 2 (Two) Times a Day. (Patient taking differently: Take 1,000 mg by mouth 2 (Two) Times a Day. AS NEEDED FOR FEVER BLISTERS), Disp: 30 tablet, Rfl: 1  •  vitamin C (ASCORBIC ACID) 500 MG tablet, Take 500 mg by mouth Daily., Disp: , Rfl:         Review of Systems   Constitutional: Negative for chills, fatigue and fever.   Cardiovascular: Negative for chest pain and palpitations.   Gastrointestinal: Negative for abdominal pain, constipation, diarrhea, nausea and vomiting.   Endocrine: Negative for cold intolerance and heat intolerance.   All other systems reviewed and are negative.      Objective       Vitals:    01/08/18 1000   BP: 132/88   Pulse: 88   Weight: 83.7 kg (184 lb 9.6 oz)   Height: 170.2 cm (67\")     Body mass index is 28.91 kg/(m^2).      Physical Exam   Constitutional: She is oriented to person, place, and time. She appears well-developed.   HENT:   Head: Normocephalic and atraumatic.   Eyes: EOM are normal. Pupils are equal, round, and reactive to light.   Neck: Normal range of motion. Neck supple. No thyromegaly present.   Cardiovascular: Normal rate, regular rhythm, " normal heart sounds and intact distal pulses.    Pulmonary/Chest: Effort normal and breath sounds normal.   Abdominal: Soft. Bowel sounds are normal. She exhibits distension. There is no tenderness.   Musculoskeletal: Normal range of motion. She exhibits no edema.   Neurological: She is alert and oriented to person, place, and time.   Skin: Skin is warm and dry. No rash noted.   Psychiatric: She has a normal mood and affect. Her behavior is normal.   Nursing note and vitals reviewed.    Results Review:     I reviewed the patient's new clinical results.    Medical records reviewed  Summary:      Admission on 12/14/2017, Discharged on 12/14/2017   Component Date Value Ref Range Status   • HCG, Urine, QL 12/14/2017 Negative  Negative Final    per [facility] nurse tech   • Lot Number 12/14/2017 [id number]   Final   • Internal Positive Control 12/14/2017 Positive   Final   • Internal Negative Control 12/14/2017 Negative   Final     No results found for: HGBA1C  Lab Results   Component Value Date    CREATININE 0.76 12/11/2017     Imaging Results (most recent)     None          Assessment and Plan:    Carly was seen today for hypothyroidism.    Diagnoses and all orders for this visit:    Other specified hypothyroidism  -     Comprehensive Metabolic Panel  -     T4, Free  -     TSH  -     Vitamin D 25 Hydroxy    Vitamin D deficiency  -     Comprehensive Metabolic Panel  -     T4, Free  -     TSH  -     Vitamin D 25 Hydroxy    Abnormal weight gain    Lymphocytic thyroiditis      Patient is currently taking vitamin D 2000 units daily  She is also compliant with the levothyroxine 75 µg daily.  She is actually losing weight she lost about 4 pounds in the past 2 months  She's not doing anything different to lose weight  She denies any symptoms suggestive of hypo-or hyperthyroidism  I advised her to continue to lose weight about 2 pounds every month until she approaches the goal weight of 165 pounds    Patient did not get lab testing  "done yet  She will get lab testing done today  After the results have been verified patient will be notified of any further changes in the dosage of levothyroxine  Patient will return to follow-up in 6 months.      The total time spent for old record and lab review and face- to- face was more than 25 min of which greater than 15 min of time was spent on counseling the patient on recommended evaluation and treatment options, instructions for management/treatment and /or follow up  and importance of compliance with chosen management or treatment options   Alexsander Pitt MD. FACE    01/08/18      EM[facility] Dragon / transcription disclaimer:     \"Dictated utilizing Dragon dictation\".         "

## 2018-02-05 RX ORDER — VALACYCLOVIR HYDROCHLORIDE 1 G/1
TABLET, FILM COATED ORAL
Qty: 30 TABLET | Refills: 0 | Status: SHIPPED | OUTPATIENT
Start: 2018-02-05 | End: 2019-01-21 | Stop reason: SDUPTHER

## 2018-03-31 DIAGNOSIS — E03.8 OTHER SPECIFIED HYPOTHYROIDISM: ICD-10-CM

## 2018-04-02 RX ORDER — LEVOTHYROXINE SODIUM 0.07 MG/1
TABLET ORAL
Qty: 90 TABLET | Refills: 0 | Status: SHIPPED | OUTPATIENT
Start: 2018-04-02 | End: 2018-07-09 | Stop reason: SDUPTHER

## 2018-07-02 ENCOUNTER — LAB (OUTPATIENT)
Dept: ENDOCRINOLOGY | Age: 63
End: 2018-07-02

## 2018-07-02 DIAGNOSIS — E03.8 OTHER SPECIFIED HYPOTHYROIDISM: Primary | ICD-10-CM

## 2018-07-02 DIAGNOSIS — E55.9 VITAMIN D DEFICIENCY: ICD-10-CM

## 2018-07-02 DIAGNOSIS — E03.8 OTHER SPECIFIED HYPOTHYROIDISM: ICD-10-CM

## 2018-07-02 LAB
25(OH)D3+25(OH)D2 SERPL-MCNC: 35.9 NG/ML (ref 30–100)
ALBUMIN SERPL-MCNC: 4.5 G/DL (ref 3.5–5.2)
ALBUMIN/GLOB SERPL: 1.7 G/DL
ALP SERPL-CCNC: 73 U/L (ref 39–117)
ALT SERPL-CCNC: 12 U/L (ref 1–33)
AST SERPL-CCNC: 17 U/L (ref 1–32)
BILIRUB SERPL-MCNC: 0.4 MG/DL (ref 0.1–1.2)
BUN SERPL-MCNC: 19 MG/DL (ref 8–23)
BUN/CREAT SERPL: 21.6 (ref 7–25)
CALCIUM SERPL-MCNC: 9.6 MG/DL (ref 8.6–10.5)
CHLORIDE SERPL-SCNC: 101 MMOL/L (ref 98–107)
CO2 SERPL-SCNC: 27.4 MMOL/L (ref 22–29)
CREAT SERPL-MCNC: 0.88 MG/DL (ref 0.57–1)
GLOBULIN SER CALC-MCNC: 2.7 GM/DL
GLUCOSE SERPL-MCNC: 103 MG/DL (ref 65–99)
POTASSIUM SERPL-SCNC: 4.5 MMOL/L (ref 3.5–5.2)
PROT SERPL-MCNC: 7.2 G/DL (ref 6–8.5)
SODIUM SERPL-SCNC: 140 MMOL/L (ref 136–145)
T4 FREE SERPL-MCNC: 0.9 NG/DL (ref 0.93–1.7)
TSH SERPL DL<=0.005 MIU/L-ACNC: 2.14 MIU/ML (ref 0.27–4.2)

## 2018-07-09 ENCOUNTER — OFFICE VISIT (OUTPATIENT)
Dept: ENDOCRINOLOGY | Age: 63
End: 2018-07-09

## 2018-07-09 VITALS
WEIGHT: 192.2 LBS | SYSTOLIC BLOOD PRESSURE: 128 MMHG | BODY MASS INDEX: 30.17 KG/M2 | HEIGHT: 67 IN | DIASTOLIC BLOOD PRESSURE: 70 MMHG

## 2018-07-09 DIAGNOSIS — R63.5 ABNORMAL WEIGHT GAIN: ICD-10-CM

## 2018-07-09 DIAGNOSIS — E03.8 OTHER SPECIFIED HYPOTHYROIDISM: Primary | ICD-10-CM

## 2018-07-09 DIAGNOSIS — E06.3 LYMPHOCYTIC THYROIDITIS: ICD-10-CM

## 2018-07-09 DIAGNOSIS — E55.9 VITAMIN D DEFICIENCY: ICD-10-CM

## 2018-07-09 PROCEDURE — 99214 OFFICE O/P EST MOD 30 MIN: CPT | Performed by: INTERNAL MEDICINE

## 2018-07-09 RX ORDER — LEVOTHYROXINE SODIUM 88 UG/1
88 TABLET ORAL DAILY
Qty: 90 TABLET | Refills: 2 | Status: SHIPPED | OUTPATIENT
Start: 2018-07-09 | End: 2019-01-10 | Stop reason: SDUPTHER

## 2018-07-09 NOTE — PROGRESS NOTES
63 y.o.    Patient Care Team:  Uriah Ramirez MD as PCP - General    Chief Complaint:    F/U HYPOTHYRODISM HERE TO DISCUSS LAB RESULTS  Subjective     HPI   Patient is a 63-year-old white female with a past history of hypothyroidism came for follow-up    Hypothyroidism  Patient is currently taking levothyroxine 75 µg daily.  She reports compliance with the medication and denies any side effects  She takes the medication empty stomach every morning  She does not mix any medication or food with the medication  Hashimoto's thyroiditis  Patient was positive for antibodies  She denies any rash or tenderness over the neck  She denies any diplopia  Hoarseness of voice  Patient reports intermittent tingling in her voice over the past several months  Not sure if it could be allergy-related  Hypertension  Patient is currently not on medication  Her blood pressure appears stable today  Vitamin D deficiency  Patient is supposed to take vitamin D3 daily 2000 units  She has not been careful with this  Menopausal symptoms  Patient is currently using estradiol patch and Provera on a daily basis  She reports compliance with the medication        Interval History      The patient is a 61-year-old postmenopausal female with history of hypothyroidism dating back to 2009.   She is currently on levothyroxine 75 mcg daily. She takes it in the morning on an empty stomach and will wait 30 minutes before eating her breakfast meal. She does not combine it with any calcium or iron or magnesium pills.  In regards to symptoms she does state that she feels tired and fatigued by the end of the day. She does have sleep apnea and is currently using the CPAP machine.  She denies bowel habit changes. No skin or hair or nail changes. Patient has been losing weight consistently for the past 1-1/2 years. She lost nearly 25 pounds since February 2014 . She has been less active due to the weather and is unable to walk outside. She started walking about  2-3 months every day and started losing weight.  Hoarseness of voice  Patient has been reporting hoarseness of voice for the past several months. She has been compliant with her medication.  Patient reported she has not been taking vitamin D or calcium since last visit. She reported that she did have a bone density scan done approximately 2 years ago and was within normal range  Patient reports intermittent hot flashes. She also get fever blisters. She started walking a lot at least 2 miles daily and lost some weight since last year           Patient reported that she continues to experience occasional hoarseness of voice.  She also reported mild hair loss and fatigue. She denies any constipation.  The following portions of the patient's history were reviewed and updated as appropriate: allergies, current medications, past family history, past medical history, past social history, past surgical history and problem list.    Past Medical History:   Diagnosis Date   • Arthritis    • Bursitis     RIGHT HIP   • Colon polyp 10/31/2017    Ascending colon: hyperplastic polyp, rectum: hyperplastic polyp   • Fever blister     OCCASIONALLY   • Hashimoto's thyroiditis    • Hypothyroidism    • Migraine    • Sleep apnea     Compliant w/ Bi-Pap     Family History   Problem Relation Age of Onset   • Hypertension Mother    • Diabetes Mother    • Malig Hyperthermia Neg Hx      Social History     Social History   • Marital status:      Spouse name: N/A   • Number of children: N/A   • Years of education: N/A     Occupational History   • Not on file.     Social History Main Topics   • Smoking status: Former Smoker     Years: 5.00     Types: Cigarettes     Quit date: 1990   • Smokeless tobacco: Never Used   • Alcohol use Yes      Comment: SOCIAL   • Drug use: No   • Sexual activity: Defer     Other Topics Concern   • Not on file     Social History Narrative   • No narrative on file     No Known Allergies    Current Outpatient  "Prescriptions:   •  aspirin-acetaminophen-caffeine (EXCEDRIN MIGRAINE) 250-250-65 MG per tablet, Take 1 tablet by mouth Every 6 (Six) Hours As Needed for Headache., Disp: , Rfl:   •  Cholecalciferol (VITAMIN D) 2000 units tablet, Take 2,000 Units by mouth Daily., Disp: , Rfl:   •  estradiol (CLIMARA) 0.05 MG/24HR patch, Place 1 patch on the skin 1 (One) Time Per Week. fridays, Disp: , Rfl:   •  levothyroxine (SYNTHROID, LEVOTHROID) 75 MCG tablet, TAKE 1 TABLET BY MOUTH EVERY DAY, Disp: 90 tablet, Rfl: 0  •  levothyroxine (SYNTHROID, LEVOTHROID) 75 MCG tablet, TAKE 1 TABLET BY MOUTH EVERY DAY, Disp: 90 tablet, Rfl: 0  •  medroxyPROGESTERone (PROVERA) 10 MG tablet, Take 10 mg by mouth Daily. Takes for one week after 12th week of estradiol patch., Disp: , Rfl:   •  meloxicam (MOBIC) 15 MG tablet, Take 15 mg by mouth Daily As Needed., Disp: , Rfl:   •  valACYclovir (VALTREX) 1000 MG tablet, TAKE 1 TABLET BY MOUTH TWICE DAILY, Disp: 30 tablet, Rfl: 0  •  vitamin C (ASCORBIC ACID) 500 MG tablet, Take 500 mg by mouth Daily., Disp: , Rfl:         Review of Systems   Constitutional: Negative for chills.   HENT: Negative for sore throat.    Eyes: Negative for visual disturbance.   Allergic/Immunologic: Negative for food allergies.   Neurological: Negative for speech difficulty.   Psychiatric/Behavioral: Negative for sleep disturbance.   All other systems reviewed and are negative.      Objective       Vitals:    07/09/18 1022   BP: 128/70   Weight: 87.2 kg (192 lb 3.2 oz)   Height: 170.2 cm (67\")     Body mass index is 30.1 kg/m².      Physical Exam   Constitutional: She is oriented to person, place, and time. She appears well-developed.   HENT:   Head: Normocephalic and atraumatic.   Eyes: EOM are normal. Pupils are equal, round, and reactive to light.   Neck: Normal range of motion. Neck supple. No thyromegaly present.   Cardiovascular: Normal rate, regular rhythm, normal heart sounds and intact distal pulses.  "   Pulmonary/Chest: Effort normal and breath sounds normal.   Abdominal: Soft. Bowel sounds are normal. She exhibits distension. There is no tenderness.   Musculoskeletal: Normal range of motion. She exhibits no edema.   Neurological: She is alert and oriented to person, place, and time.   Skin: Skin is warm and dry. No rash noted.   Psychiatric: She has a normal mood and affect. Her behavior is normal.   Nursing note and vitals reviewed.    Results Review:     I reviewed the patient's new clinical results.    Medical records reviewed  Summary:      Lab on 07/02/2018   Component Date Value Ref Range Status   • Glucose 07/02/2018 103* 65 - 99 mg/dL Final   • BUN 07/02/2018 19  8 - 23 mg/dL Final   • Creatinine 07/02/2018 0.88  0.57 - 1.00 mg/dL Final   • eGFR Non  Am 07/02/2018 65  >60 mL/min/1.73 Final   • eGFR African Am 07/02/2018 79  >60 mL/min/1.73 Final   • BUN/Creatinine Ratio 07/02/2018 21.6  7.0 - 25.0 Final   • Sodium 07/02/2018 140  136 - 145 mmol/L Final   • Potassium 07/02/2018 4.5  3.5 - 5.2 mmol/L Final   • Chloride 07/02/2018 101  98 - 107 mmol/L Final   • Total CO2 07/02/2018 27.4  22.0 - 29.0 mmol/L Final   • Calcium 07/02/2018 9.6  8.6 - 10.5 mg/dL Final   • Total Protein 07/02/2018 7.2  6.0 - 8.5 g/dL Final   • Albumin 07/02/2018 4.50  3.50 - 5.20 g/dL Final   • Globulin 07/02/2018 2.7  gm/dL Final   • A/G Ratio 07/02/2018 1.7  g/dL Final   • Total Bilirubin 07/02/2018 0.4  0.1 - 1.2 mg/dL Final   • Alkaline Phosphatase 07/02/2018 73  39 - 117 U/L Final   • AST (SGOT) 07/02/2018 17  1 - 32 U/L Final   • ALT (SGPT) 07/02/2018 12  1 - 33 U/L Final   • Free T4 07/02/2018 0.90* 0.93 - 1.70 ng/dL Final   • TSH 07/02/2018 2.140  0.270 - 4.200 mIU/mL Final   • 25 Hydroxy, Vitamin D 07/02/2018 35.9  30.0 - 100.0 ng/ml Final    Comment: Reference Range for Total Vitamin D 25(OH)  Deficiency    <20.0 ng/mL  Insufficiency 21-29 ng/mL  Sufficiency    ng/mL  Toxicity      >100 ng/ml          No  "results found for: HGBA1C  Lab Results   Component Value Date    CREATININE 0.88 07/02/2018     Imaging Results (most recent)     None        Assessment and Plan:    Diagnoses and all orders for this visit:    Other specified hypothyroidism  -     TSH; Future  -     T4, Free; Future  -     Lipid Panel; Future  -     Comprehensive Metabolic Panel; Future    Lymphocytic thyroiditis  -     TSH; Future  -     T4, Free; Future  -     Lipid Panel; Future  -     Comprehensive Metabolic Panel; Future    Abnormal weight gain    Vitamin D deficiency    Other orders  -     levothyroxine (SYNTHROID) 88 MCG tablet; Take 1 tablet by mouth Daily.    Patient reports recent weight gain and fatigue  She had been compliant with her medication  Patient is currently taking levothyroxine 75 µg daily.  She takes it on empty stomach  She denies any side effects from medication    Abnormal weight gain  Patient currently weighs 192 pounds gain nearly 8 pounds in the past 6 months  Patient has not been exercising    Lab reports reviewed  Patient's TSH is 2.1  Free T4 0.90  I suggested that patient must increase her levothyroxine to 88 µg  She might feel the benefit from this and may also lose weight  I advised the patient to start walking at least 30 minutes daily make weight-loss hypertension    Patient verbalized understanding  Patient will return to follow-up in 6 months.    The total time spent  was more than 25 min of which greater than 15 min of time ( greater than 50% of the total time )  was spent face to face with the patient counseling and coordination of care on recommended evaluation and treatment options, instructions for management/treatment and /or follow up  and importance of compliance with chosen management or treatment options     Alexsander Pitt MD. FACE    07/09/18      EMR Dragon / transcription disclaimer:     \"Dictated utilizing Dragon dictation\".         "

## 2018-07-14 ENCOUNTER — RESULTS ENCOUNTER (OUTPATIENT)
Dept: ENDOCRINOLOGY | Age: 63
End: 2018-07-14

## 2018-07-14 DIAGNOSIS — E03.8 OTHER SPECIFIED HYPOTHYROIDISM: ICD-10-CM

## 2018-07-14 DIAGNOSIS — E06.3 LYMPHOCYTIC THYROIDITIS: ICD-10-CM

## 2018-07-16 RX ORDER — LEVOTHYROXINE SODIUM 0.07 MG/1
TABLET ORAL
Qty: 90 TABLET | Refills: 0 | OUTPATIENT
Start: 2018-07-16

## 2018-08-27 ENCOUNTER — RESULTS ENCOUNTER (OUTPATIENT)
Dept: ENDOCRINOLOGY | Age: 63
End: 2018-08-27

## 2018-08-27 DIAGNOSIS — E03.8 OTHER SPECIFIED HYPOTHYROIDISM: ICD-10-CM

## 2018-08-27 DIAGNOSIS — E06.3 LYMPHOCYTIC THYROIDITIS: ICD-10-CM

## 2019-01-02 DIAGNOSIS — E55.9 VITAMIN D DEFICIENCY: ICD-10-CM

## 2019-01-02 DIAGNOSIS — E03.8 OTHER SPECIFIED HYPOTHYROIDISM: Primary | ICD-10-CM

## 2019-01-03 ENCOUNTER — RESULTS ENCOUNTER (OUTPATIENT)
Dept: ENDOCRINOLOGY | Age: 64
End: 2019-01-03

## 2019-01-03 DIAGNOSIS — E03.8 OTHER SPECIFIED HYPOTHYROIDISM: ICD-10-CM

## 2019-01-03 DIAGNOSIS — E55.9 VITAMIN D DEFICIENCY: ICD-10-CM

## 2019-01-03 LAB
25(OH)D3+25(OH)D2 SERPL-MCNC: 35.1 NG/ML (ref 30–100)
ALBUMIN SERPL-MCNC: 4.4 G/DL (ref 3.5–5.2)
ALBUMIN/GLOB SERPL: 1.3 G/DL
ALP SERPL-CCNC: 88 U/L (ref 39–117)
ALT SERPL-CCNC: 13 U/L (ref 1–33)
AST SERPL-CCNC: 16 U/L (ref 1–32)
BILIRUB SERPL-MCNC: 0.7 MG/DL (ref 0.1–1.2)
BUN SERPL-MCNC: 11 MG/DL (ref 8–23)
BUN/CREAT SERPL: 12.4 (ref 7–25)
CALCIUM SERPL-MCNC: 10.1 MG/DL (ref 8.6–10.5)
CHLORIDE SERPL-SCNC: 101 MMOL/L (ref 98–107)
CHOLEST SERPL-MCNC: 205 MG/DL (ref 0–200)
CO2 SERPL-SCNC: 29.5 MMOL/L (ref 22–29)
CREAT SERPL-MCNC: 0.89 MG/DL (ref 0.57–1)
GLOBULIN SER CALC-MCNC: 3.3 GM/DL
GLUCOSE SERPL-MCNC: 103 MG/DL (ref 65–99)
HDLC SERPL-MCNC: 59 MG/DL (ref 40–60)
INTERPRETATION: NORMAL
LDLC SERPL CALC-MCNC: 124 MG/DL (ref 0–100)
POTASSIUM SERPL-SCNC: 4.3 MMOL/L (ref 3.5–5.2)
PROT SERPL-MCNC: 7.7 G/DL (ref 6–8.5)
SODIUM SERPL-SCNC: 140 MMOL/L (ref 136–145)
T4 FREE SERPL-MCNC: 0.82 NG/DL (ref 0.93–1.7)
TRIGL SERPL-MCNC: 112 MG/DL (ref 0–150)
TSH SERPL DL<=0.005 MIU/L-ACNC: 3.17 MIU/ML (ref 0.27–4.2)
VLDLC SERPL CALC-MCNC: 22.4 MG/DL (ref 5–40)

## 2019-01-10 ENCOUNTER — OFFICE VISIT (OUTPATIENT)
Dept: ENDOCRINOLOGY | Age: 64
End: 2019-01-10

## 2019-01-10 VITALS
DIASTOLIC BLOOD PRESSURE: 100 MMHG | BODY MASS INDEX: 29.91 KG/M2 | SYSTOLIC BLOOD PRESSURE: 150 MMHG | HEART RATE: 75 BPM | WEIGHT: 190.6 LBS | HEIGHT: 67 IN

## 2019-01-10 DIAGNOSIS — E55.9 VITAMIN D DEFICIENCY: ICD-10-CM

## 2019-01-10 DIAGNOSIS — R63.5 ABNORMAL WEIGHT GAIN: ICD-10-CM

## 2019-01-10 DIAGNOSIS — E03.8 OTHER SPECIFIED HYPOTHYROIDISM: Primary | ICD-10-CM

## 2019-01-10 DIAGNOSIS — E06.3 LYMPHOCYTIC THYROIDITIS: ICD-10-CM

## 2019-01-10 PROCEDURE — 99214 OFFICE O/P EST MOD 30 MIN: CPT | Performed by: INTERNAL MEDICINE

## 2019-01-10 RX ORDER — LEVOTHYROXINE SODIUM 0.1 MG/1
100 TABLET ORAL DAILY
Qty: 90 TABLET | Refills: 2 | Status: SHIPPED | OUTPATIENT
Start: 2019-01-10 | End: 2019-07-01 | Stop reason: SDUPTHER

## 2019-01-10 RX ORDER — ASCORBIC ACID 500 MG
TABLET ORAL
COMMUNITY
End: 2020-01-02 | Stop reason: SDUPTHER

## 2019-01-10 NOTE — PROGRESS NOTES
63 y.o.    Patient Care Team:  Uriah Ramirez MD as PCP - General    Chief Complaint:      F/U HYPOTHYRODISM   HERE TO DISCUSS LAB RESULTS     Subjective     HPI   Patient is a 63-year-old white female with a past history of hypothyroidism came for follow-up    Hypothyroidism  Patient is currently taking levothyroxine 88 µg daily.  She's taking it in the early morning  At least one hour before eating or drinking  She also does not take any other medication with it  Hashimoto's thyroiditis  Patient was positive for antibodies  She denies any rash or diplopia or tenderness over the neck  Hoarseness of voice  Patient reports intermittent tingling in her voice for the past several months  She believes it might be allergy-related  Hypertension  Patient is currently not on medication  Her blood pressure is still elevated at 150/100  Vitamin D deficiency  Patient is supposed to be on vitamin D3 daily 2000 units  She's not been very compliant with this  Menopausal symptoms  Patient is currently using estradiol patch and Provera on a regular basis        Interval History    The patient is a 61-year-old postmenopausal female with history of hypothyroidism dating back to 2009.   She is currently on levothyroxine 75 mcg daily. She takes it in the morning on an empty stomach and will wait 30 minutes before eating her breakfast meal. She does not combine it with any calcium or iron or magnesium pills.  In regards to symptoms she does state that she feels tired and fatigued by the end of the day. She does have sleep apnea and is currently using the CPAP machine.  She denies bowel habit changes. No skin or hair or nail changes. Patient has been losing weight consistently for the past 1-1/2 years. She lost nearly 25 pounds since February 2014 . She has been less active due to the weather and is unable to walk outside. She started walking about 2-3 months every day and started losing weight.  Hoarseness of voice  Patient has been  reporting hoarseness of voice for the past several months. She has been compliant with her medication.  Patient reported she has not been taking vitamin D or calcium since last visit. She reported that she did have a bone density scan done approximately 2 years ago and was within normal range  Patient reports intermittent hot flashes. She also get fever blisters. She started walking a lot at least 2 miles daily and lost some weight since last year           Patient reported that she continues to experience occasional hoarseness of voice.  She also reported mild hair loss and fatigue. She denies any constipation.          The following portions of the patient's history were reviewed and updated as appropriate: allergies, current medications, past family history, past medical history, past social history, past surgical history and problem list.    Past Medical History:   Diagnosis Date   • Arthritis    • Bursitis     RIGHT HIP   • Colon polyp 10/31/2017    Ascending colon: hyperplastic polyp, rectum: hyperplastic polyp   • Fever blister     OCCASIONALLY   • Hashimoto's thyroiditis    • Hypothyroidism    • Migraine    • Sleep apnea     Compliant w/ Bi-Pap     Family History   Problem Relation Age of Onset   • Hypertension Mother    • Diabetes Mother    • Malig Hyperthermia Neg Hx      Social History     Socioeconomic History   • Marital status:      Spouse name: Not on file   • Number of children: Not on file   • Years of education: Not on file   • Highest education level: Not on file   Social Needs   • Financial resource strain: Not on file   • Food insecurity - worry: Not on file   • Food insecurity - inability: Not on file   • Transportation needs - medical: Not on file   • Transportation needs - non-medical: Not on file   Occupational History   • Not on file   Tobacco Use   • Smoking status: Former Smoker     Years: 5.00     Types: Cigarettes     Last attempt to quit:      Years since quittin.0   •  "Smokeless tobacco: Never Used   Substance and Sexual Activity   • Alcohol use: Yes     Comment: SOCIAL   • Drug use: No   • Sexual activity: Defer   Other Topics Concern   • Not on file   Social History Narrative   • Not on file     No Known Allergies    Current Outpatient Medications:   •  aspirin-acetaminophen-caffeine (EXCEDRIN MIGRAINE) 250-250-65 MG per tablet, Take 1 tablet by mouth Every 6 (Six) Hours As Needed for Headache., Disp: , Rfl:   •  Cholecalciferol (VITAMIN D) 2000 units tablet, Take 2,000 Units by mouth Daily., Disp: , Rfl:   •  estradiol (CLIMARA) 0.05 MG/24HR patch, Place 1 patch on the skin 1 (One) Time Per Week. fridays, Disp: , Rfl:   •  levothyroxine (SYNTHROID) 88 MCG tablet, Take 1 tablet by mouth Daily., Disp: 90 tablet, Rfl: 2  •  medroxyPROGESTERone (PROVERA) 10 MG tablet, Take 10 mg by mouth Daily. Takes for one week after 12th week of estradiol patch., Disp: , Rfl:   •  meloxicam (MOBIC) 15 MG tablet, Take 15 mg by mouth Daily As Needed., Disp: , Rfl:   •  valACYclovir (VALTREX) 1000 MG tablet, TAKE 1 TABLET BY MOUTH TWICE DAILY, Disp: 30 tablet, Rfl: 0  •  vitamin C (ASCORBIC ACID) 500 MG tablet, Take 500 mg by mouth Daily., Disp: , Rfl:         Review of Systems   Constitutional: Negative for chills, fatigue and fever.   Cardiovascular: Negative for chest pain and palpitations.   Gastrointestinal: Negative for abdominal pain, constipation, diarrhea, nausea and vomiting.   Endocrine: Negative for cold intolerance and heat intolerance.   All other systems reviewed and are negative.      Objective       Vitals:    01/10/19 1121   BP: 150/100   Pulse: 75   Weight: 86.5 kg (190 lb 9.6 oz)   Height: 170.2 cm (67\")     Body mass index is 29.85 kg/m².      Physical Exam   Constitutional: She is oriented to person, place, and time. She appears well-developed.   HENT:   Head: Normocephalic and atraumatic.   Eyes: EOM are normal. Pupils are equal, round, and reactive to light.   Neck: Normal " range of motion. Neck supple. No thyromegaly present.   Cardiovascular: Normal rate, regular rhythm, normal heart sounds and intact distal pulses.   Pulmonary/Chest: Effort normal and breath sounds normal.   Abdominal: Soft. Bowel sounds are normal. She exhibits distension. There is no tenderness.   Musculoskeletal: Normal range of motion. She exhibits no edema.   Neurological: She is alert and oriented to person, place, and time.   Skin: Skin is warm and dry. No rash noted.   Psychiatric: She has a normal mood and affect. Her behavior is normal.   Nursing note and vitals reviewed.    Results Review:     I reviewed the patient's new clinical results.    Medical records reviewed  Summary:      Results Encounter on 01/03/2019   Component Date Value Ref Range Status   • Free T4 01/03/2019 0.82* 0.93 - 1.70 ng/dL Final   • TSH 01/03/2019 3.170  0.270 - 4.200 mIU/mL Final   • 25 Hydroxy, Vitamin D 01/03/2019 35.1  30.0 - 100.0 ng/ml Final    Comment: Reference Range for Total Vitamin D 25(OH)  Deficiency <20.0 ng/mL  Insufficiency 21-29 ng/mL  Sufficiency  ng/mL  Toxicity >100 ng/ml     • Total Cholesterol 01/03/2019 205* 0 - 200 mg/dL Final   • Triglycerides 01/03/2019 112  0 - 150 mg/dL Final   • HDL Cholesterol 01/03/2019 59  40 - 60 mg/dL Final   • VLDL Cholesterol 01/03/2019 22.4  5 - 40 mg/dL Final   • LDL Cholesterol  01/03/2019 124* 0 - 100 mg/dL Final   • Glucose 01/03/2019 103* 65 - 99 mg/dL Final   • BUN 01/03/2019 11  8 - 23 mg/dL Final   • Creatinine 01/03/2019 0.89  0.57 - 1.00 mg/dL Final   • eGFR Non  Am 01/03/2019 64  >60 mL/min/1.73 Final   • eGFR African Am 01/03/2019 78  >60 mL/min/1.73 Final   • BUN/Creatinine Ratio 01/03/2019 12.4  7.0 - 25.0 Final   • Sodium 01/03/2019 140  136 - 145 mmol/L Final   • Potassium 01/03/2019 4.3  3.5 - 5.2 mmol/L Final   • Chloride 01/03/2019 101  98 - 107 mmol/L Final   • Total CO2 01/03/2019 29.5* 22.0 - 29.0 mmol/L Final   • Calcium 01/03/2019 10.1  8.6  - 10.5 mg/dL Final   • Total Protein 01/03/2019 7.7  6.0 - 8.5 g/dL Final   • Albumin 01/03/2019 4.40  3.50 - 5.20 g/dL Final   • Globulin 01/03/2019 3.3  gm/dL Final   • A/G Ratio 01/03/2019 1.3  g/dL Final   • Total Bilirubin 01/03/2019 0.7  0.1 - 1.2 mg/dL Final   • Alkaline Phosphatase 01/03/2019 88  39 - 117 U/L Final   • AST (SGOT) 01/03/2019 16  1 - 32 U/L Final   • ALT (SGPT) 01/03/2019 13  1 - 33 U/L Final   • Interpretation 01/03/2019 Note   Final    Supplemental report is available.     No results found for: HGBA1C  Lab Results   Component Value Date    CREATININE 0.89 01/03/2019     Imaging Results (most recent)     None                Assessment and Plan:    Carly was seen today for hypothyroidism.    Diagnoses and all orders for this visit:    Other specified hypothyroidism    Lymphocytic thyroiditis    Abnormal weight gain    Vitamin D deficiency    Other orders  -     levothyroxine (SYNTHROID, LEVOTHROID) 100 MCG tablet; Take 1 tablet by mouth Daily.      Patient lost 2 pounds  Denies any symptoms suggestive of hypothyroidism  Lab reports reviewed  Free T4 is 0.83 much lower than before  TSH is in the 3.1 range    I advised the patient to increase levothyroxine 100 µg  The possibility of genetic medication causing this fluctuation discussed with the patient  Will continue the generic medication for this time  Prescriptions sent to pharmacy  Patient is advised to take the medication on empty stomach in the morning  Weight at least one hour before eating or drinking or taking any other medication  She takes all vitamins in the evening    Patient's blood pressure is still elevated frequently  I discussed with the primary care physician and get on some medication    I advised the patient to call for lab if she gains or loses 10 pounds within the next 6 months  Patient will return to follow-up in 6 months with lab tests.    The total time spent  was more than 25 min of which greater than 15 min of time  "(greater than 50% of the total time)  was spent face to face with the patient counseling and coordination of care on recommended evaluation and treatment options, instructions for management/treatment and /or follow up and importance of compliance with chosen management or treatment options    Alexsander Pitt MD. FACE    01/10/19      EMR Dragon / transcription disclaimer:     \"Dictated utilizing Dragon dictation\".         "

## 2019-01-10 NOTE — PROGRESS NOTES
63 y.o.    Patient Care Team:  Uriah Ramirez MD as PCP - General    Chief Complaint:      Subjective     HPI      Interval History      The following portions of the patient's history were reviewed and updated as appropriate: allergies, current medications, past family history, past medical history, past social history, past surgical history and problem list.    Past Medical History:   Diagnosis Date   • Arthritis    • Bursitis     RIGHT HIP   • Colon polyp 10/31/2017    Ascending colon: hyperplastic polyp, rectum: hyperplastic polyp   • Fever blister     OCCASIONALLY   • Hashimoto's thyroiditis    • Hypothyroidism    • Migraine    • Sleep apnea     Compliant w/ Bi-Pap     Family History   Problem Relation Age of Onset   • Hypertension Mother    • Diabetes Mother    • Malig Hyperthermia Neg Hx      Social History     Socioeconomic History   • Marital status:      Spouse name: Not on file   • Number of children: Not on file   • Years of education: Not on file   • Highest education level: Not on file   Social Needs   • Financial resource strain: Not on file   • Food insecurity - worry: Not on file   • Food insecurity - inability: Not on file   • Transportation needs - medical: Not on file   • Transportation needs - non-medical: Not on file   Occupational History   • Not on file   Tobacco Use   • Smoking status: Former Smoker     Years: 5.00     Types: Cigarettes     Last attempt to quit:      Years since quittin.0   • Smokeless tobacco: Never Used   Substance and Sexual Activity   • Alcohol use: Yes     Comment: SOCIAL   • Drug use: No   • Sexual activity: Defer   Other Topics Concern   • Not on file   Social History Narrative   • Not on file     No Known Allergies    Current Outpatient Medications:   •  aspirin-acetaminophen-caffeine (EXCEDRIN MIGRAINE) 250-250-65 MG per tablet, Take 1 tablet by mouth Every 6 (Six) Hours As Needed for Headache., Disp: , Rfl:   •  Cholecalciferol (VITAMIN D)   "units tablet, Take 2,000 Units by mouth Daily., Disp: , Rfl:   •  estradiol (CLIMARA) 0.05 MG/24HR patch, Place 1 patch on the skin 1 (One) Time Per Week. fridays, Disp: , Rfl:   •  levothyroxine (SYNTHROID) 88 MCG tablet, Take 1 tablet by mouth Daily., Disp: 90 tablet, Rfl: 2  •  medroxyPROGESTERone (PROVERA) 10 MG tablet, Take 10 mg by mouth Daily. Takes for one week after 12th week of estradiol patch., Disp: , Rfl:   •  meloxicam (MOBIC) 15 MG tablet, Take 15 mg by mouth Daily As Needed., Disp: , Rfl:   •  valACYclovir (VALTREX) 1000 MG tablet, TAKE 1 TABLET BY MOUTH TWICE DAILY, Disp: 30 tablet, Rfl: 0  •  vitamin C (ASCORBIC ACID) 500 MG tablet, Take 500 mg by mouth Daily., Disp: , Rfl:         Review of Systems    Objective       There were no vitals filed for this visit.  There is no height or weight on file to calculate BMI.      Physical Exam  Results Review:     {Results Review:63441::\"I reviewed the patient's new clinical results.\"}    Medical records reviewed  Summary:***      Results Encounter on 01/03/2019   Component Date Value Ref Range Status   • Free T4 01/03/2019 0.82* 0.93 - 1.70 ng/dL Final   • TSH 01/03/2019 3.170  0.270 - 4.200 mIU/mL Final   • 25 Hydroxy, Vitamin D 01/03/2019 35.1  30.0 - 100.0 ng/ml Final    Comment: Reference Range for Total Vitamin D 25(OH)  Deficiency <20.0 ng/mL  Insufficiency 21-29 ng/mL  Sufficiency  ng/mL  Toxicity >100 ng/ml     • Total Cholesterol 01/03/2019 205* 0 - 200 mg/dL Final   • Triglycerides 01/03/2019 112  0 - 150 mg/dL Final   • HDL Cholesterol 01/03/2019 59  40 - 60 mg/dL Final   • VLDL Cholesterol 01/03/2019 22.4  5 - 40 mg/dL Final   • LDL Cholesterol  01/03/2019 124* 0 - 100 mg/dL Final   • Glucose 01/03/2019 103* 65 - 99 mg/dL Final   • BUN 01/03/2019 11  8 - 23 mg/dL Final   • Creatinine 01/03/2019 0.89  0.57 - 1.00 mg/dL Final   • eGFR Non  Am 01/03/2019 64  >60 mL/min/1.73 Final   • eGFR African Am 01/03/2019 78  >60 mL/min/1.73 Final " "  • BUN/Creatinine Ratio 01/03/2019 12.4  7.0 - 25.0 Final   • Sodium 01/03/2019 140  136 - 145 mmol/L Final   • Potassium 01/03/2019 4.3  3.5 - 5.2 mmol/L Final   • Chloride 01/03/2019 101  98 - 107 mmol/L Final   • Total CO2 01/03/2019 29.5* 22.0 - 29.0 mmol/L Final   • Calcium 01/03/2019 10.1  8.6 - 10.5 mg/dL Final   • Total Protein 01/03/2019 7.7  6.0 - 8.5 g/dL Final   • Albumin 01/03/2019 4.40  3.50 - 5.20 g/dL Final   • Globulin 01/03/2019 3.3  gm/dL Final   • A/G Ratio 01/03/2019 1.3  g/dL Final   • Total Bilirubin 01/03/2019 0.7  0.1 - 1.2 mg/dL Final   • Alkaline Phosphatase 01/03/2019 88  39 - 117 U/L Final   • AST (SGOT) 01/03/2019 16  1 - 32 U/L Final   • ALT (SGPT) 01/03/2019 13  1 - 33 U/L Final   • Interpretation 01/03/2019 Note   Final    Supplemental report is available.     No results found for: HGBA1C  Lab Results   Component Value Date    CREATININE 0.89 01/03/2019     Imaging Results (most recent)     None                Assessment and Plan:    There are no diagnoses linked to this encounter.      Alexsander Pitt MD. FACE    01/10/19      EMR Dragon / transcription disclaimer:     \"Dictated utilizing Dragon dictation\".         "

## 2019-01-21 RX ORDER — VALACYCLOVIR HYDROCHLORIDE 1 G/1
TABLET, FILM COATED ORAL
Qty: 30 TABLET | Refills: 0 | Status: SHIPPED | OUTPATIENT
Start: 2019-01-21 | End: 2021-03-04 | Stop reason: SDUPTHER

## 2019-06-21 DIAGNOSIS — E03.8 OTHER SPECIFIED HYPOTHYROIDISM: Primary | ICD-10-CM

## 2019-06-21 DIAGNOSIS — E06.3 LYMPHOCYTIC THYROIDITIS: ICD-10-CM

## 2019-06-21 DIAGNOSIS — E55.9 VITAMIN D DEFICIENCY: ICD-10-CM

## 2019-06-24 ENCOUNTER — RESULTS ENCOUNTER (OUTPATIENT)
Dept: ENDOCRINOLOGY | Age: 64
End: 2019-06-24

## 2019-06-24 DIAGNOSIS — E03.8 OTHER SPECIFIED HYPOTHYROIDISM: ICD-10-CM

## 2019-06-24 DIAGNOSIS — E06.3 LYMPHOCYTIC THYROIDITIS: ICD-10-CM

## 2019-06-24 DIAGNOSIS — E55.9 VITAMIN D DEFICIENCY: ICD-10-CM

## 2019-06-25 LAB
25(OH)D3+25(OH)D2 SERPL-MCNC: 39.5 NG/ML (ref 30–100)
ALBUMIN SERPL-MCNC: 4.4 G/DL (ref 3.5–5.2)
ALBUMIN/GLOB SERPL: 1.5 G/DL
ALP SERPL-CCNC: 81 U/L (ref 39–117)
ALT SERPL-CCNC: 12 U/L (ref 1–33)
AST SERPL-CCNC: 16 U/L (ref 1–32)
BILIRUB SERPL-MCNC: 0.6 MG/DL (ref 0.2–1.2)
BUN SERPL-MCNC: 17 MG/DL (ref 8–23)
BUN/CREAT SERPL: 21.8 (ref 7–25)
CALCIUM SERPL-MCNC: 9.9 MG/DL (ref 8.6–10.5)
CHLORIDE SERPL-SCNC: 102 MMOL/L (ref 98–107)
CHOLEST SERPL-MCNC: 136 MG/DL (ref 0–200)
CO2 SERPL-SCNC: 28 MMOL/L (ref 22–29)
CREAT SERPL-MCNC: 0.78 MG/DL (ref 0.57–1)
GLOBULIN SER CALC-MCNC: 3 GM/DL
GLUCOSE SERPL-MCNC: 108 MG/DL (ref 65–99)
HDLC SERPL-MCNC: 50 MG/DL (ref 40–60)
INTERPRETATION: NORMAL
LDLC SERPL CALC-MCNC: 74 MG/DL (ref 0–100)
POTASSIUM SERPL-SCNC: 4.4 MMOL/L (ref 3.5–5.2)
PROT SERPL-MCNC: 7.4 G/DL (ref 6–8.5)
SODIUM SERPL-SCNC: 142 MMOL/L (ref 136–145)
T4 FREE SERPL-MCNC: 1.36 NG/DL (ref 0.93–1.7)
TRIGL SERPL-MCNC: 59 MG/DL (ref 0–150)
TSH SERPL DL<=0.005 MIU/L-ACNC: 0.05 MIU/ML (ref 0.27–4.2)
VLDLC SERPL CALC-MCNC: 11.8 MG/DL

## 2019-07-01 ENCOUNTER — OFFICE VISIT (OUTPATIENT)
Dept: ENDOCRINOLOGY | Age: 64
End: 2019-07-01

## 2019-07-01 VITALS
SYSTOLIC BLOOD PRESSURE: 122 MMHG | BODY MASS INDEX: 29.6 KG/M2 | DIASTOLIC BLOOD PRESSURE: 80 MMHG | WEIGHT: 188.6 LBS | HEIGHT: 67 IN | HEART RATE: 80 BPM

## 2019-07-01 DIAGNOSIS — E06.3 LYMPHOCYTIC THYROIDITIS: ICD-10-CM

## 2019-07-01 DIAGNOSIS — R63.5 ABNORMAL WEIGHT GAIN: ICD-10-CM

## 2019-07-01 DIAGNOSIS — E03.8 OTHER SPECIFIED HYPOTHYROIDISM: Primary | ICD-10-CM

## 2019-07-01 DIAGNOSIS — E55.9 VITAMIN D DEFICIENCY: ICD-10-CM

## 2019-07-01 PROCEDURE — 99214 OFFICE O/P EST MOD 30 MIN: CPT | Performed by: INTERNAL MEDICINE

## 2019-07-01 RX ORDER — LEVOTHYROXINE SODIUM 88 UG/1
88 TABLET ORAL DAILY
Qty: 90 TABLET | Refills: 2 | Status: SHIPPED | OUTPATIENT
Start: 2019-07-01 | End: 2020-01-02 | Stop reason: SDUPTHER

## 2019-07-01 NOTE — PROGRESS NOTES
64 y.o.    Patient Care Team:  Uriah Ramirez MD as PCP - General    Chief Complaint:      F/U HYPOTHYRODISM   HERE TO DISCUSS LAB RESULTS  Subjective     HPI   Patient is a 64-year-old female with a past history of hypothyroidism came for follow-up      Patient is currently taking levothyroxine 100 mcg daily  She takes the medication on empty stomach  She waits for at least 1 hour before eating or drinking  Hashimoto's thyroiditis  Patient is positive for antibodies  She denies any skin rash or diplopia or tenderness over the neck  Hoarseness of voice  This is been an intermittent complaint for the past several months  Hypertension  Patient is currently not on medication  Vitamin D deficiency  Patient is supposed to be on vitamin D3 daily  She had not been very compliant patient denies any symptoms of hyper or hypothyroidism  Menopausal symptoms  Patient is currently using estradiol patch and Provera on a regular basis      Interval History    The patient is a 61-year-old postmenopausal female with history of hypothyroidism dating back to 2009.   She is currently on levothyroxine 75 mcg daily. She takes it in the morning on an empty stomach and will wait 30 minutes before eating her breakfast meal. She does not combine it with any calcium or iron or magnesium pills.  In regards to symptoms she does state that she feels tired and fatigued by the end of the day. She does have sleep apnea and is currently using the CPAP machine.  She denies bowel habit changes. No skin or hair or nail changes. Patient has been losing weight consistently for the past 1-1/2 years. She lost nearly 25 pounds since February 2014 . She has been less active due to the weather and is unable to walk outside. She started walking about 2-3 months every day and started losing weight.  Hoarseness of voice  Patient has been reporting hoarseness of voice for the past several months. She has been compliant with her medication.  Patient reported she  has not been taking vitamin D or calcium since last visit. She reported that she did have a bone density scan done approximately 2 years ago and was within normal range  Patient reports intermittent hot flashes. She also get fever blisters. She started walking a lot at least 2 miles daily and lost some weight since last year           Patient reported that she continues to experience occasional hoarseness of voice.  She also reported mild hair loss and fatigue. She denies any constipation.        The following portions of the patient's history were reviewed and updated as appropriate: allergies, current medications, past family history, past medical history, past social history, past surgical history and problem list.    Past Medical History:   Diagnosis Date   • Arthritis    • Bursitis     RIGHT HIP   • Colon polyp 10/31/2017    Ascending colon: hyperplastic polyp, rectum: hyperplastic polyp   • Fever blister     OCCASIONALLY   • Hashimoto's thyroiditis    • Hypothyroidism    • Migraine    • Sleep apnea     Compliant w/ Bi-Pap     Family History   Problem Relation Age of Onset   • Hypertension Mother    • Diabetes Mother    • Malig Hyperthermia Neg Hx      Social History     Socioeconomic History   • Marital status:      Spouse name: Not on file   • Number of children: Not on file   • Years of education: Not on file   • Highest education level: Not on file   Tobacco Use   • Smoking status: Former Smoker     Years: 5.00     Types: Cigarettes     Last attempt to quit:      Years since quittin.5   • Smokeless tobacco: Never Used   Substance and Sexual Activity   • Alcohol use: Yes     Comment: SOCIAL   • Drug use: No   • Sexual activity: Defer     No Known Allergies    Current Outpatient Medications:   •  aspirin-acetaminophen-caffeine (EXCEDRIN MIGRAINE) 250-250-65 MG per tablet, Take 1 tablet by mouth Every 6 (Six) Hours As Needed for Headache., Disp: , Rfl:   •  Cholecalciferol (VITAMIN D)   "units tablet, Take 2,000 Units by mouth Daily., Disp: , Rfl:   •  estradiol (CLIMARA) 0.05 MG/24HR patch, Place 1 patch on the skin 1 (One) Time Per Week. fridays, Disp: , Rfl:   •  levothyroxine (SYNTHROID, LEVOTHROID) 100 MCG tablet, Take 1 tablet by mouth Daily., Disp: 90 tablet, Rfl: 2  •  medroxyPROGESTERone (PROVERA) 10 MG tablet, Take 10 mg by mouth Daily. Takes for one week after 12th week of estradiol patch., Disp: , Rfl:   •  meloxicam (MOBIC) 15 MG tablet, Take 15 mg by mouth Daily As Needed., Disp: , Rfl:   •  valACYclovir (VALTREX) 1000 MG tablet, TAKE 1 TABLET BY MOUTH TWICE DAILY, Disp: 30 tablet, Rfl: 0  •  vitamin C (ASCORBIC ACID) 500 MG tablet, Take 500 mg by mouth Daily., Disp: , Rfl:   •  vitamin C (ASCORBIC ACID) 500 MG tablet, Take  by mouth., Disp: , Rfl:         Review of Systems   Constitutional: Negative for chills, fatigue and fever.   Cardiovascular: Negative for chest pain and palpitations.   Gastrointestinal: Negative for abdominal pain, constipation, diarrhea, nausea and vomiting.   Endocrine: Negative for cold intolerance and heat intolerance.   All other systems reviewed and are negative.      Objective       Vitals:    07/01/19 1010   BP: 122/80   Pulse: 80   Weight: 85.5 kg (188 lb 9.6 oz)   Height: 170.2 cm (67\")     Body mass index is 29.54 kg/m².      Physical Exam   Constitutional: She is oriented to person, place, and time. She appears well-developed.   HENT:   Head: Normocephalic and atraumatic.   Eyes: EOM are normal. Pupils are equal, round, and reactive to light.   Neck: Normal range of motion. Neck supple. No thyromegaly present.   Cardiovascular: Normal rate, regular rhythm, normal heart sounds and intact distal pulses.   Pulmonary/Chest: Effort normal and breath sounds normal.   Abdominal: Soft. Bowel sounds are normal. She exhibits distension. There is no tenderness.   Musculoskeletal: Normal range of motion. She exhibits no edema.   Neurological: She is alert and " oriented to person, place, and time.   Skin: Skin is warm and dry. No rash noted.   Psychiatric: She has a normal mood and affect. Her behavior is normal.   Nursing note and vitals reviewed.    Results Review:     I reviewed the patient's new clinical results.    Medical records reviewed  Summary:      Results Encounter on 06/24/2019   Component Date Value Ref Range Status   • Glucose 06/24/2019 108* 65 - 99 mg/dL Final   • BUN 06/24/2019 17  8 - 23 mg/dL Final   • Creatinine 06/24/2019 0.78  0.57 - 1.00 mg/dL Final   • eGFR Non  Am 06/24/2019 74  >60 mL/min/1.73 Final   • eGFR African Am 06/24/2019 90  >60 mL/min/1.73 Final   • BUN/Creatinine Ratio 06/24/2019 21.8  7.0 - 25.0 Final   • Sodium 06/24/2019 142  136 - 145 mmol/L Final   • Potassium 06/24/2019 4.4  3.5 - 5.2 mmol/L Final   • Chloride 06/24/2019 102  98 - 107 mmol/L Final   • Total CO2 06/24/2019 28.0  22.0 - 29.0 mmol/L Final   • Calcium 06/24/2019 9.9  8.6 - 10.5 mg/dL Final   • Total Protein 06/24/2019 7.4  6.0 - 8.5 g/dL Final   • Albumin 06/24/2019 4.40  3.50 - 5.20 g/dL Final   • Globulin 06/24/2019 3.0  gm/dL Final   • A/G Ratio 06/24/2019 1.5  g/dL Final   • Total Bilirubin 06/24/2019 0.6  0.2 - 1.2 mg/dL Final   • Alkaline Phosphatase 06/24/2019 81  39 - 117 U/L Final   • AST (SGOT) 06/24/2019 16  1 - 32 U/L Final   • ALT (SGPT) 06/24/2019 12  1 - 33 U/L Final   • Free T4 06/24/2019 1.36  0.93 - 1.70 ng/dL Final   • TSH 06/24/2019 0.051* 0.270 - 4.200 mIU/mL Final   • 25 Hydroxy, Vitamin D 06/24/2019 39.5  30.0 - 100.0 ng/ml Final    Comment: Reference Range for Total Vitamin D 25(OH)  Deficiency <20.0 ng/mL  Insufficiency 21-29 ng/mL  Sufficiency  ng/mL  Toxicity >100 ng/ml     • Total Cholesterol 06/24/2019 136  0 - 200 mg/dL Final   • Triglycerides 06/24/2019 59  0 - 150 mg/dL Final   • HDL Cholesterol 06/24/2019 50  40 - 60 mg/dL Final   • VLDL Cholesterol 06/24/2019 11.8  mg/dL Final   • LDL Cholesterol  06/24/2019 74  0 - 100  "mg/dL Final   • Interpretation 06/24/2019 Note   Final    Supplemental report is available.     No results found for: HGBA1C  Lab Results   Component Value Date    CREATININE 0.78 06/24/2019     Imaging Results (most recent)     None          Assessment and Plan:    Carly was seen today for hypothyroidism.    Diagnoses and all orders for this visit:    Other specified hypothyroidism    Lymphocytic thyroiditis    Vitamin D deficiency    Abnormal weight gain    Other orders  -     levothyroxine (SYNTHROID, LEVOTHROID) 88 MCG tablet; Take 1 tablet by mouth Daily.    Patient lost additional 2 pounds since last visit  She denies any symptoms of hyper or hypothyroidism  Her thyroxine dose was adjusted to 100 mcg last visit  Patient is tolerating the medication  She is taking it on empty stomach  She is waiting at least 2 hours before she eats or drinks  She is taking all vitamins in the evening    Lab reports reviewed  TSH 0.05  Free T4 1.36  I will decrease the dose to 88 mcg    Advised the patient to continue the administration instructions as given before  Patient return to follow-up in 6 months with lab test.     The total time spent  was more than 25 min of which greater than 15 min of time (greater than 50% of the total time)  was spent face to face with the patient counseling and coordination of care on recommended evaluation and treatment options, instructions for management/treatment and /or follow up and importance of compliance with chosen management or treatment options    Alexsander Pitt MD. FACE    07/01/19      EMR Dragon / transcription disclaimer:     \"Dictated utilizing Dragon dictation\".         "

## 2019-11-14 ENCOUNTER — OFFICE VISIT (OUTPATIENT)
Dept: FAMILY MEDICINE CLINIC | Facility: CLINIC | Age: 64
End: 2019-11-14

## 2019-11-14 VITALS
OXYGEN SATURATION: 98 % | TEMPERATURE: 97.7 F | SYSTOLIC BLOOD PRESSURE: 138 MMHG | BODY MASS INDEX: 29.98 KG/M2 | DIASTOLIC BLOOD PRESSURE: 90 MMHG | WEIGHT: 191 LBS | HEART RATE: 86 BPM | HEIGHT: 67 IN

## 2019-11-14 DIAGNOSIS — H61.23 BILATERAL IMPACTED CERUMEN: Primary | ICD-10-CM

## 2019-11-14 DIAGNOSIS — H92.01 OTALGIA OF RIGHT EAR: ICD-10-CM

## 2019-11-14 PROCEDURE — 69209 REMOVE IMPACTED EAR WAX UNI: CPT | Performed by: NURSE PRACTITIONER

## 2019-11-14 PROCEDURE — 99213 OFFICE O/P EST LOW 20 MIN: CPT | Performed by: NURSE PRACTITIONER

## 2019-11-14 NOTE — PATIENT INSTRUCTIONS
bilat ear lavage today tolerated well  Do not put anything in ears.   If symptoms persist call office   Increase fluid intake, get plenty of rest.   Patient agrees with plan of care and understands instructions. Call if worsening symptoms or any problems or concerns.

## 2019-11-14 NOTE — PROGRESS NOTES
Subjective   Carly De is a 64 y.o. female.     History of Present Illness   C/o right ear pressure, denies ear pain, does have pressure, sharp pain, denies pain in left ear, denies fever, she tried OTC drop but did not help, she does have decreased hearing on right side, denies drainage, sinus presusre, sore throat, denies cough.       The following portions of the patient's history were reviewed and updated as appropriate: allergies, current medications, past family history, past medical history, past social history, past surgical history and problem list.    Review of Systems   Constitutional: Negative for chills, diaphoresis and fever.   HENT: Positive for ear pain and hearing loss. Negative for ear discharge, postnasal drip, rhinorrhea, sinus pressure and sore throat.    Respiratory: Negative for cough and shortness of breath.    Cardiovascular: Negative for chest pain.   Musculoskeletal: Negative for arthralgias and myalgias.   Allergic/Immunologic: Positive for environmental allergies.   Neurological: Negative for dizziness, light-headedness and headache.   All other systems reviewed and are negative.      Objective   Physical Exam   Constitutional: She is oriented to person, place, and time. She appears well-developed and well-nourished.   HENT:   Head: Normocephalic.   Right Ear: cerumen impaction is present.  Left Ear: An impacted cerumen is present.  Nose: Nose normal.   Mouth/Throat: Uvula is midline, oropharynx is clear and moist and mucous membranes are normal.   Eyes: Pupils are equal, round, and reactive to light.   Neck: Normal range of motion.   Cardiovascular: Normal rate, regular rhythm and normal heart sounds.   Pulmonary/Chest: Effort normal and breath sounds normal.   Musculoskeletal: Normal range of motion.   Neurological: She is alert and oriented to person, place, and time.   Skin: Skin is warm and dry.   Psychiatric: She has a normal mood and affect. Her behavior is normal.    Nursing note and vitals reviewed.    bilat TM WNL after lavage.     Assessment/Plan   Carly was seen today for ear fullness.    Diagnoses and all orders for this visit:    Bilateral impacted cerumen    Otalgia of right ear      bilat ear lavage today tolerated well  Do not put anything in ears.   If symptoms persist call office   Increase fluid intake, get plenty of rest.   Patient agrees with plan of care and understands instructions. Call if worsening symptoms or any problems or concerns.

## 2019-12-18 ENCOUNTER — RESULTS ENCOUNTER (OUTPATIENT)
Dept: ENDOCRINOLOGY | Age: 64
End: 2019-12-18

## 2019-12-18 DIAGNOSIS — E55.9 VITAMIN D DEFICIENCY: ICD-10-CM

## 2019-12-18 DIAGNOSIS — E03.8 OTHER SPECIFIED HYPOTHYROIDISM: Primary | ICD-10-CM

## 2019-12-18 DIAGNOSIS — E03.8 OTHER SPECIFIED HYPOTHYROIDISM: ICD-10-CM

## 2019-12-19 LAB
25(OH)D3+25(OH)D2 SERPL-MCNC: 34 NG/ML (ref 30–100)
ALBUMIN SERPL-MCNC: 4.5 G/DL (ref 3.5–5.2)
ALBUMIN/GLOB SERPL: 1.5 G/DL
ALP SERPL-CCNC: 85 U/L (ref 39–117)
ALT SERPL-CCNC: 17 U/L (ref 1–33)
AST SERPL-CCNC: 22 U/L (ref 1–32)
BILIRUB SERPL-MCNC: 0.6 MG/DL (ref 0.2–1.2)
BUN SERPL-MCNC: 14 MG/DL (ref 8–23)
BUN/CREAT SERPL: 16.9 (ref 7–25)
CALCIUM SERPL-MCNC: 9.8 MG/DL (ref 8.6–10.5)
CHLORIDE SERPL-SCNC: 99 MMOL/L (ref 98–107)
CHOLEST SERPL-MCNC: 180 MG/DL (ref 0–200)
CO2 SERPL-SCNC: 29.8 MMOL/L (ref 22–29)
CREAT SERPL-MCNC: 0.83 MG/DL (ref 0.57–1)
GLOBULIN SER CALC-MCNC: 3.1 GM/DL
GLUCOSE SERPL-MCNC: 101 MG/DL (ref 65–99)
HDLC SERPL-MCNC: 51 MG/DL (ref 40–60)
INTERPRETATION: NORMAL
LDLC SERPL CALC-MCNC: 115 MG/DL (ref 0–100)
POTASSIUM SERPL-SCNC: 4.3 MMOL/L (ref 3.5–5.2)
PROT SERPL-MCNC: 7.6 G/DL (ref 6–8.5)
SODIUM SERPL-SCNC: 139 MMOL/L (ref 136–145)
T4 FREE SERPL-MCNC: 0.98 NG/DL (ref 0.93–1.7)
TRIGL SERPL-MCNC: 72 MG/DL (ref 0–150)
TSH SERPL DL<=0.005 MIU/L-ACNC: 0.66 UIU/ML (ref 0.27–4.2)
VLDLC SERPL CALC-MCNC: 14.4 MG/DL

## 2020-01-02 ENCOUNTER — OFFICE VISIT (OUTPATIENT)
Dept: ENDOCRINOLOGY | Age: 65
End: 2020-01-02

## 2020-01-02 VITALS
HEART RATE: 90 BPM | BODY MASS INDEX: 30.13 KG/M2 | SYSTOLIC BLOOD PRESSURE: 122 MMHG | WEIGHT: 192 LBS | DIASTOLIC BLOOD PRESSURE: 78 MMHG | HEIGHT: 67 IN

## 2020-01-02 DIAGNOSIS — R63.5 ABNORMAL WEIGHT GAIN: ICD-10-CM

## 2020-01-02 DIAGNOSIS — E03.9 HYPOTHYROIDISM, UNSPECIFIED TYPE: Primary | ICD-10-CM

## 2020-01-02 DIAGNOSIS — E06.3 HASHIMOTO'S DISEASE: ICD-10-CM

## 2020-01-02 DIAGNOSIS — E06.3 LYMPHOCYTIC THYROIDITIS: ICD-10-CM

## 2020-01-02 DIAGNOSIS — E55.9 VITAMIN D DEFICIENCY: ICD-10-CM

## 2020-01-02 PROCEDURE — 99214 OFFICE O/P EST MOD 30 MIN: CPT | Performed by: INTERNAL MEDICINE

## 2020-01-02 RX ORDER — LEVOTHYROXINE SODIUM 88 UG/1
88 TABLET ORAL DAILY
Qty: 90 TABLET | Refills: 2 | Status: SHIPPED | OUTPATIENT
Start: 2020-01-02 | End: 2020-07-02 | Stop reason: SDUPTHER

## 2020-01-02 NOTE — PROGRESS NOTES
64 y.o.    Patient Care Team:  Uriah Ramirez MD as PCP - General    Chief Complaint:      F/U HYPOTHYRODISM   HERE TO DISCUSS LAB RESULTS     Subjective     HPI   Patient is a 64-year-old female with a history of hypothyroidism came for follow-up    Hypothyroidism  Patient is currently taking levothyroxine 88 mcg daily.  Her dose was reduced at the last visit  Patient had lab testing done  She is taking the medication empty stomach 1 hour before eating or drinking  Hashimoto's thyroiditis  Patient is positive for antibodies  She denies any diplopia or tenderness over the neck  Hoarseness of voice  This has been intermittent  Hypertension  Currently not on medication  Hyperlipidemia  Currently not on medication  Vitamin D deficiency  Patient is currently taking vitamin D3 daily  Patient denies any symptoms of hyper or hypothyroidism  Menopausal symptoms  Patient is currently taking Provera along with estradiol patch on a regular basis      Interval History      The following portions of the patient's history were reviewed and updated as appropriate: allergies, current medications, past family history, past medical history, past social history, past surgical history and problem list.    Past Medical History:   Diagnosis Date   • Arthritis    • Bursitis     RIGHT HIP   • Colon polyp 10/31/2017    Ascending colon: hyperplastic polyp, rectum: hyperplastic polyp   • Fever blister     OCCASIONALLY   • Hashimoto's thyroiditis    • Hypothyroidism    • Migraine    • Sleep apnea     Compliant w/ Bi-Pap     Family History   Problem Relation Age of Onset   • Hypertension Mother    • Diabetes Mother    • Malig Hyperthermia Neg Hx      Social History     Socioeconomic History   • Marital status:      Spouse name: Not on file   • Number of children: Not on file   • Years of education: Not on file   • Highest education level: Not on file   Tobacco Use   • Smoking status: Former Smoker     Years: 5.00     Types: Cigarettes  "    Last attempt to quit: 1990     Years since quittin.0   • Smokeless tobacco: Never Used   Substance and Sexual Activity   • Alcohol use: Yes     Comment: SOCIAL   • Drug use: No   • Sexual activity: Defer     No Known Allergies    Current Outpatient Medications:   •  aspirin-acetaminophen-caffeine (EXCEDRIN MIGRAINE) 250-250-65 MG per tablet, Take 1 tablet by mouth Every 6 (Six) Hours As Needed for Headache., Disp: , Rfl:   •  Cholecalciferol (VITAMIN D) 2000 units tablet, Take 2,000 Units by mouth Daily., Disp: , Rfl:   •  estradiol (CLIMARA) 0.05 MG/24HR patch, Place 1 patch on the skin 1 (One) Time Per Week. , Disp: , Rfl:   •  levothyroxine (SYNTHROID, LEVOTHROID) 88 MCG tablet, Take 1 tablet by mouth Daily., Disp: 90 tablet, Rfl: 2  •  medroxyPROGESTERone (PROVERA) 10 MG tablet, Take 10 mg by mouth Daily. Takes for one week after 12th week of estradiol patch., Disp: , Rfl:   •  meloxicam (MOBIC) 15 MG tablet, Take 15 mg by mouth Daily As Needed., Disp: , Rfl:   •  valACYclovir (VALTREX) 1000 MG tablet, TAKE 1 TABLET BY MOUTH TWICE DAILY, Disp: 30 tablet, Rfl: 0  •  vitamin C (ASCORBIC ACID) 500 MG tablet, Take 500 mg by mouth Daily., Disp: , Rfl:         Review of Systems   Constitutional: Positive for fatigue. Negative for chills and fever.   Cardiovascular: Negative for chest pain and palpitations.   Gastrointestinal: Negative for abdominal pain, constipation, diarrhea, nausea and vomiting.   Endocrine: Negative for cold intolerance and heat intolerance.   All other systems reviewed and are negative.      Objective       Vitals:    20 0906   BP: 122/78   Pulse: 90   Weight: 87.1 kg (192 lb)   Height: 170.2 cm (67\")     Body mass index is 30.07 kg/m².      Physical Exam   Constitutional: She is oriented to person, place, and time. She appears well-developed.   HENT:   Head: Normocephalic and atraumatic.   Eyes: Pupils are equal, round, and reactive to light. EOM are normal.   Neck: Normal " range of motion. Neck supple. No thyromegaly present.   Cardiovascular: Normal rate, regular rhythm, normal heart sounds and intact distal pulses.   Pulmonary/Chest: Effort normal and breath sounds normal.   Abdominal: Soft. Bowel sounds are normal. She exhibits distension. There is no tenderness.   Musculoskeletal: Normal range of motion. She exhibits no edema.   Neurological: She is alert and oriented to person, place, and time.   Skin: Skin is warm and dry. No rash noted.   Psychiatric: She has a normal mood and affect. Her behavior is normal.   Nursing note and vitals reviewed.    Results Review:     I reviewed the patient's new clinical results.    Medical records reviewed  Summary:  Primary care records reviewed  Patient's cholesterol is still slightly elevated with LDL of 115  Patient is currently not on medication  Possibly the effect of hypothyroidism      Results Encounter on 12/18/2019   Component Date Value Ref Range Status   • Free T4 12/19/2019 0.98  0.93 - 1.70 ng/dL Final   • Glucose 12/19/2019 101* 65 - 99 mg/dL Final   • BUN 12/19/2019 14  8 - 23 mg/dL Final   • Creatinine 12/19/2019 0.83  0.57 - 1.00 mg/dL Final   • eGFR Non  Am 12/19/2019 69  >60 mL/min/1.73 Final   • eGFR African Am 12/19/2019 84  >60 mL/min/1.73 Final   • BUN/Creatinine Ratio 12/19/2019 16.9  7.0 - 25.0 Final   • Sodium 12/19/2019 139  136 - 145 mmol/L Final   • Potassium 12/19/2019 4.3  3.5 - 5.2 mmol/L Final   • Chloride 12/19/2019 99  98 - 107 mmol/L Final   • Total CO2 12/19/2019 29.8* 22.0 - 29.0 mmol/L Final   • Calcium 12/19/2019 9.8  8.6 - 10.5 mg/dL Final   • Total Protein 12/19/2019 7.6  6.0 - 8.5 g/dL Final   • Albumin 12/19/2019 4.50  3.50 - 5.20 g/dL Final   • Globulin 12/19/2019 3.1  gm/dL Final   • A/G Ratio 12/19/2019 1.5  g/dL Final   • Total Bilirubin 12/19/2019 0.6  0.2 - 1.2 mg/dL Final   • Alkaline Phosphatase 12/19/2019 85  39 - 117 U/L Final   • AST (SGOT) 12/19/2019 22  1 - 32 U/L Final   • ALT  "(SGPT) 12/19/2019 17  1 - 33 U/L Final   • TSH 12/19/2019 0.655  0.270 - 4.200 uIU/mL Final   • 25 Hydroxy, Vitamin D 12/19/2019 34.0  30.0 - 100.0 ng/ml Final    Comment: Reference Range for Total Vitamin D 25(OH)  Deficiency <20.0 ng/mL  Insufficiency 21-29 ng/mL  Sufficiency  ng/mL  Toxicity >100 ng/ml     • Total Cholesterol 12/19/2019 180  0 - 200 mg/dL Final   • Triglycerides 12/19/2019 72  0 - 150 mg/dL Final   • HDL Cholesterol 12/19/2019 51  40 - 60 mg/dL Final   • VLDL Cholesterol 12/19/2019 14.4  mg/dL Final   • LDL Cholesterol  12/19/2019 115* 0 - 100 mg/dL Final   • Interpretation 12/19/2019 Note   Final    Supplemental report is available.     No results found for: HGBA1C  Lab Results   Component Value Date    CREATININE 0.83 12/19/2019     Imaging Results (Most Recent)     None          Assessment and Plan:    Carly was seen today for hypothyroidism.    Diagnoses and all orders for this visit:    Hypothyroidism, unspecified type    Lymphocytic thyroiditis    Abnormal weight gain    Vitamin D deficiency    Hashimoto's disease    Other orders  -     levothyroxine (SYNTHROID, LEVOTHROID) 88 MCG tablet; Take 1 tablet by mouth Daily.        Patient's levothyroxine dose was decreased to 88 mcg last visit  TSH is come up to 0.6 but still low  But the free T4 is 0.9  I recommend continuing the current dose of 88 mcg for now  Patient is reporting somewhat fatigue and hair loss but I think it is unlikely to be from thyroid issues    Patient's LDL cholesterol is still 115  I encouraged her to consider bring it below 100    Patient return to follow-up in 6 months.    Alexsander Pitt MD. FACE    01/02/20      EMR Dragon / transcription disclaimer:     \"Dictated utilizing Dragon dictation\".         "

## 2020-02-21 ENCOUNTER — HOSPITAL ENCOUNTER (EMERGENCY)
Facility: HOSPITAL | Age: 65
Discharge: HOME | End: 2020-02-21
Attending: EMERGENCY MEDICINE | Admitting: EMERGENCY MEDICINE

## 2020-02-21 ENCOUNTER — APPOINTMENT (OUTPATIENT)
Dept: CT IMAGING | Facility: HOSPITAL | Age: 65
End: 2020-02-21

## 2020-02-21 VITALS
BODY MASS INDEX: 29.82 KG/M2 | SYSTOLIC BLOOD PRESSURE: 140 MMHG | OXYGEN SATURATION: 98 % | TEMPERATURE: 98.6 F | DIASTOLIC BLOOD PRESSURE: 86 MMHG | HEIGHT: 67 IN | WEIGHT: 190 LBS | HEART RATE: 84 BPM | RESPIRATION RATE: 18 BRPM

## 2020-02-21 DIAGNOSIS — K52.9 COLITIS: Primary | ICD-10-CM

## 2020-02-21 LAB
ALBUMIN SERPL-MCNC: 4.1 G/DL (ref 3.5–5.2)
ALBUMIN/GLOB SERPL: 1.2 G/DL
ALP SERPL-CCNC: 86 U/L (ref 39–117)
ALT SERPL W P-5'-P-CCNC: 16 U/L (ref 1–33)
ANION GAP SERPL CALCULATED.3IONS-SCNC: 11.6 MMOL/L (ref 5–15)
AST SERPL-CCNC: 12 U/L (ref 1–32)
BASOPHILS # BLD AUTO: 0.04 10*3/MM3 (ref 0–0.2)
BASOPHILS NFR BLD AUTO: 0.4 % (ref 0–1.5)
BILIRUB SERPL-MCNC: 0.9 MG/DL (ref 0.2–1.2)
BILIRUB UR QL STRIP: NEGATIVE
BUN BLD-MCNC: 13 MG/DL (ref 8–23)
BUN/CREAT SERPL: 16.5 (ref 7–25)
CALCIUM SPEC-SCNC: 10.2 MG/DL (ref 8.6–10.5)
CHLORIDE SERPL-SCNC: 96 MMOL/L (ref 98–107)
CLARITY UR: ABNORMAL
CO2 SERPL-SCNC: 26.4 MMOL/L (ref 22–29)
COLOR UR: YELLOW
CREAT BLD-MCNC: 0.79 MG/DL (ref 0.57–1)
DEPRECATED RDW RBC AUTO: 40.7 FL (ref 37–54)
EOSINOPHIL # BLD AUTO: 0.05 10*3/MM3 (ref 0–0.4)
EOSINOPHIL NFR BLD AUTO: 0.5 % (ref 0.3–6.2)
ERYTHROCYTE [DISTWIDTH] IN BLOOD BY AUTOMATED COUNT: 13.6 % (ref 12.3–15.4)
GFR SERPL CREATININE-BSD FRML MDRD: 89 ML/MIN/1.73
GLOBULIN UR ELPH-MCNC: 3.5 GM/DL
GLUCOSE BLD-MCNC: 99 MG/DL (ref 65–99)
GLUCOSE UR STRIP-MCNC: NEGATIVE MG/DL
HCT VFR BLD AUTO: 42.4 % (ref 34–46.6)
HGB BLD-MCNC: 13.4 G/DL (ref 12–15.9)
HGB UR QL STRIP.AUTO: NEGATIVE
HOLD SPECIMEN: NORMAL
HOLD SPECIMEN: NORMAL
IMM GRANULOCYTES # BLD AUTO: 0.02 10*3/MM3 (ref 0–0.05)
IMM GRANULOCYTES NFR BLD AUTO: 0.2 % (ref 0–0.5)
KETONES UR QL STRIP: NEGATIVE
LEUKOCYTE ESTERASE UR QL STRIP.AUTO: NEGATIVE
LIPASE SERPL-CCNC: 18 U/L (ref 13–60)
LYMPHOCYTES # BLD AUTO: 2.23 10*3/MM3 (ref 0.7–3.1)
LYMPHOCYTES NFR BLD AUTO: 24.5 % (ref 19.6–45.3)
MCH RBC QN AUTO: 26 PG (ref 26.6–33)
MCHC RBC AUTO-ENTMCNC: 31.6 G/DL (ref 31.5–35.7)
MCV RBC AUTO: 82.2 FL (ref 79–97)
MONOCYTES # BLD AUTO: 0.85 10*3/MM3 (ref 0.1–0.9)
MONOCYTES NFR BLD AUTO: 9.3 % (ref 5–12)
NEUTROPHILS # BLD AUTO: 5.92 10*3/MM3 (ref 1.7–7)
NEUTROPHILS NFR BLD AUTO: 65.1 % (ref 42.7–76)
NITRITE UR QL STRIP: NEGATIVE
NRBC BLD AUTO-RTO: 0 /100 WBC (ref 0–0.2)
PH UR STRIP.AUTO: 5.5 [PH] (ref 5–8)
PLATELET # BLD AUTO: 280 10*3/MM3 (ref 140–450)
PMV BLD AUTO: 9.8 FL (ref 6–12)
POTASSIUM BLD-SCNC: 4.2 MMOL/L (ref 3.5–5.2)
PROT SERPL-MCNC: 7.6 G/DL (ref 6–8.5)
PROT UR QL STRIP: NEGATIVE
RBC # BLD AUTO: 5.16 10*6/MM3 (ref 3.77–5.28)
SODIUM BLD-SCNC: 134 MMOL/L (ref 136–145)
SP GR UR STRIP: 1.02 (ref 1–1.03)
UROBILINOGEN UR QL STRIP: ABNORMAL
WBC NRBC COR # BLD: 9.11 10*3/MM3 (ref 3.4–10.8)
WHOLE BLOOD HOLD SPECIMEN: NORMAL
WHOLE BLOOD HOLD SPECIMEN: NORMAL

## 2020-02-21 PROCEDURE — 83690 ASSAY OF LIPASE: CPT

## 2020-02-21 PROCEDURE — 85025 COMPLETE CBC W/AUTO DIFF WBC: CPT

## 2020-02-21 PROCEDURE — 36415 COLL VENOUS BLD VENIPUNCTURE: CPT

## 2020-02-21 PROCEDURE — 81003 URINALYSIS AUTO W/O SCOPE: CPT | Performed by: EMERGENCY MEDICINE

## 2020-02-21 PROCEDURE — 25010000002 IOPAMIDOL 61 % SOLUTION: Performed by: EMERGENCY MEDICINE

## 2020-02-21 PROCEDURE — 74177 CT ABD & PELVIS W/CONTRAST: CPT

## 2020-02-21 PROCEDURE — 99283 EMERGENCY DEPT VISIT LOW MDM: CPT

## 2020-02-21 PROCEDURE — 80053 COMPREHEN METABOLIC PANEL: CPT

## 2020-02-21 RX ORDER — CIPROFLOXACIN 500 MG/1
500 TABLET, FILM COATED ORAL 2 TIMES DAILY
Qty: 14 TABLET | Refills: 0 | Status: SHIPPED | OUTPATIENT
Start: 2020-02-21 | End: 2020-02-28

## 2020-02-21 RX ORDER — SODIUM CHLORIDE 0.9 % (FLUSH) 0.9 %
10 SYRINGE (ML) INJECTION AS NEEDED
Status: DISCONTINUED | OUTPATIENT
Start: 2020-02-21 | End: 2020-02-21 | Stop reason: HOSPADM

## 2020-02-21 RX ORDER — METRONIDAZOLE 500 MG/1
500 TABLET ORAL 2 TIMES DAILY
Qty: 14 TABLET | Refills: 0 | Status: SHIPPED | OUTPATIENT
Start: 2020-02-21 | End: 2020-02-21 | Stop reason: SDUPTHER

## 2020-02-21 RX ORDER — CIPROFLOXACIN 500 MG/1
500 TABLET, FILM COATED ORAL 2 TIMES DAILY
Qty: 14 TABLET | Refills: 0 | Status: SHIPPED | OUTPATIENT
Start: 2020-02-21 | End: 2020-02-21 | Stop reason: SDUPTHER

## 2020-02-21 RX ORDER — METRONIDAZOLE 500 MG/1
500 TABLET ORAL 2 TIMES DAILY
Qty: 14 TABLET | Refills: 0 | Status: SHIPPED | OUTPATIENT
Start: 2020-02-21 | End: 2020-02-28

## 2020-02-21 RX ADMIN — IOPAMIDOL 85 ML: 612 INJECTION, SOLUTION INTRAVENOUS at 18:23

## 2020-02-21 RX ADMIN — SODIUM CHLORIDE, PRESERVATIVE FREE 10 ML: 5 INJECTION INTRAVENOUS at 16:39

## 2020-02-21 NOTE — ED PROVIDER NOTES
" EMERGENCY DEPARTMENT ENCOUNTER    Room Number:  04/04  Date seen:  2/21/2020  Time seen: 4:18 PM  PCP: Uriah Ramirez MD  Historian: patient      HPI:  Chief Complaint: abdominal pain  A complete HPI/ROS/PMH/PSH/SH/FH are unobtainable due to: nothing  Context: Carly De is a 65 y.o. female who presents to the ED c/o gradual onset, intermittent, worsening left lower and upper quadrant abdominal pain that started four days ago. Pt states the pain is worsened when laying supine, and feels \"like something is twisting in me.\" Also c/o urinary frequency at night. Denies fever, chills, dysuria, diarrhea, constipation, vomiting, or nausea.           PAST MEDICAL HISTORY  Active Ambulatory Problems     Diagnosis Date Noted   • Abnormal weight gain 02/09/2016   • Lymphocytic thyroiditis 02/09/2016   • Hoarseness 02/09/2016   • Hypothyroidism 02/09/2016   • Sleep apnea 02/09/2016   • Disorder of thyroid 02/09/2016   • Vitamin D deficiency 06/22/2016   • Colon cancer screening 10/05/2017     Resolved Ambulatory Problems     Diagnosis Date Noted   • Incisional hernia, without obstruction or gangrene 11/21/2017     Past Medical History:   Diagnosis Date   • Arthritis    • Bursitis    • Colon polyp 10/31/2017   • Fever blister    • Hashimoto's thyroiditis    • Migraine          PAST SURGICAL HISTORY  Past Surgical History:   Procedure Laterality Date   • COLONOSCOPY N/A 2005   • COLONOSCOPY N/A 2010    pt reports no polyps, [address]   • COLONOSCOPY N/A 10/31/2017    Procedure: COLONOSCOPY to Cecum;  Surgeon: Rene Patrick MD;  Location: [facility] ENDOSCOPY;  Service:    • DILATATION AND CURETTAGE  01/28/2015    Dr. Gan-benign endometrium w/ changes consistent w/ endometrial polyp   • DILATATION AND CURETTAGE  09/13/2001    [facility]--benign   • HAND SURGERY     • HERNIA REPAIR N/A 01/04/2012    Open repair of incarcerated epigastric ventral hernia w/ Ventralex mesh; Dr. Bandar Parks   • TEMPORAL ARTERY BIOPSY " Left 2011    Dr. Ines Russell   • VENTRAL HERNIA REPAIR N/A 2017    Procedure: VENTRAL HERNIA REPAIR LAPAROSCOPIC WITH DAVINCI ROBOT ROBOTIC ASSISTED LAPAROSCOPIC RECURRENT INCISIONAL HERNIA REPAIR x2 WITH MESH, WITH REMOVAL OF OLD MESH ;  Surgeon: Rene Patrick MD;  Location: [facility] OR;  Service:    • WRIST SURGERY           FAMILY HISTORY  Family History   Problem Relation Age of Onset   • Hypertension Mother    • Diabetes Mother    • Malig Hyperthermia Neg Hx          SOCIAL HISTORY  Social History     Socioeconomic History   • Marital status:      Spouse name: Not on file   • Number of children: Not on file   • Years of education: Not on file   • Highest education level: Not on file   Tobacco Use   • Smoking status: Former Smoker     Years: 5.00     Types: Cigarettes     Last attempt to quit:      Years since quittin.1   • Smokeless tobacco: Never Used   Substance and Sexual Activity   • Alcohol use: Yes     Comment: SOCIAL   • Drug use: No   • Sexual activity: Defer         ALLERGIES  Patient has no known allergies.        REVIEW OF SYSTEMS  Review of Systems   Constitutional: Negative for diaphoresis and fever.   HENT: Negative for congestion.    Eyes: Negative for visual disturbance.   Respiratory: Negative for shortness of breath.    Cardiovascular: Negative for palpitations.   Gastrointestinal: Positive for abdominal pain (L sided). Negative for blood in stool and vomiting.   Endocrine: Negative for polyuria.   Genitourinary: Positive for frequency (at night). Negative for flank pain.   Musculoskeletal: Negative for joint swelling.   Skin: Negative for wound.   Neurological: Negative for seizures.   Hematological: Negative for adenopathy.   Psychiatric/Behavioral: Negative for sleep disturbance.            PHYSICAL EXAM  ED Triage Vitals   Temp Heart Rate Resp BP SpO2   20 1342 20 1342 20 1401 20 1401 20 1342   98.6 °F (37 °C) 117 18  (!) 136/101 98 %      Temp src Heart Rate Source Patient Position BP Location FiO2 (%)   02/21/20 1342 -- -- -- --   Tympanic             GENERAL: no acute distress, awake, alert  HENT: nares patent  EYES: no scleral icterus  CV: regular rhythm, normal rate, no murmur  RESPIRATORY: normal effort, CTAB  ABDOMEN: soft, mild tenderness in LLQ and LUQ, no rebound, no guarding  MUSCULOSKELETAL: no deformity  NEURO: alert, moves all extremities, follows commands  SKIN: warm, dry    Vital signs and nursing notes reviewed.          LAB RESULTS  Recent Results (from the past 24 hour(s))   Comprehensive Metabolic Panel    Collection Time: 02/21/20  2:59 PM   Result Value Ref Range    Glucose 99 65 - 99 mg/dL    BUN 13 8 - 23 mg/dL    Creatinine 0.79 0.57 - 1.00 mg/dL    Sodium 134 (L) 136 - 145 mmol/L    Potassium 4.2 3.5 - 5.2 mmol/L    Chloride 96 (L) 98 - 107 mmol/L    CO2 26.4 22.0 - 29.0 mmol/L    Calcium 10.2 8.6 - 10.5 mg/dL    Total Protein 7.6 6.0 - 8.5 g/dL    Albumin 4.10 3.50 - 5.20 g/dL    ALT (SGPT) 16 1 - 33 U/L    AST (SGOT) 12 1 - 32 U/L    Alkaline Phosphatase 86 39 - 117 U/L    Total Bilirubin 0.9 0.2 - 1.2 mg/dL    eGFR  African Amer 89 >60 mL/min/1.73    Globulin 3.5 gm/dL    A/G Ratio 1.2 g/dL    BUN/Creatinine Ratio 16.5 7.0 - 25.0    Anion Gap 11.6 5.0 - 15.0 mmol/L   Lipase    Collection Time: 02/21/20  2:59 PM   Result Value Ref Range    Lipase 18 13 - 60 U/L   Light Blue Top    Collection Time: 02/21/20  2:59 PM   Result Value Ref Range    Extra Tube hold for add-on    Green Top (Gel)    Collection Time: 02/21/20  2:59 PM   Result Value Ref Range    Extra Tube Hold for add-ons.    Lavender Top    Collection Time: 02/21/20  2:59 PM   Result Value Ref Range    Extra Tube hold for add-on    Gold Top - SST    Collection Time: 02/21/20  2:59 PM   Result Value Ref Range    Extra Tube Hold for add-ons.    CBC Auto Differential    Collection Time: 02/21/20  2:59 PM   Result Value Ref Range    WBC 9.11 3.40  - 10.80 10*3/mm3    RBC 5.16 3.77 - 5.28 10*6/mm3    Hemoglobin 13.4 12.0 - 15.9 g/dL    Hematocrit 42.4 34.0 - 46.6 %    MCV 82.2 79.0 - 97.0 fL    MCH 26.0 (L) 26.6 - 33.0 pg    MCHC 31.6 31.5 - 35.7 g/dL    RDW 13.6 12.3 - 15.4 %    RDW-SD 40.7 37.0 - 54.0 fl    MPV 9.8 6.0 - 12.0 fL    Platelets 280 140 - 450 10*3/mm3    Neutrophil % 65.1 42.7 - 76.0 %    Lymphocyte % 24.5 19.6 - 45.3 %    Monocyte % 9.3 5.0 - 12.0 %    Eosinophil % 0.5 0.3 - 6.2 %    Basophil % 0.4 0.0 - 1.5 %    Immature Grans % 0.2 0.0 - 0.5 %    Neutrophils, Absolute 5.92 1.70 - 7.00 10*3/mm3    Lymphocytes, Absolute 2.23 0.70 - 3.10 10*3/mm3    Monocytes, Absolute 0.85 0.10 - 0.90 10*3/mm3    Eosinophils, Absolute 0.05 0.00 - 0.40 10*3/mm3    Basophils, Absolute 0.04 0.00 - 0.20 10*3/mm3    Immature Grans, Absolute 0.02 0.00 - 0.05 10*3/mm3    nRBC 0.0 0.0 - 0.2 /100 WBC   Urinalysis With Microscopic If Indicated (No Culture) - Urine, Clean Catch    Collection Time: 02/21/20  4:36 PM   Result Value Ref Range    Color, UA Yellow Yellow, Straw    Appearance, UA Cloudy (A) Clear    pH, UA 5.5 5.0 - 8.0    Specific Gravity, UA 1.017 1.005 - 1.030    Glucose, UA Negative Negative    Ketones, UA Negative Negative    Bilirubin, UA Negative Negative    Blood, UA Negative Negative    Protein, UA Negative Negative    Leuk Esterase, UA Negative Negative    Nitrite, UA Negative Negative    Urobilinogen, UA 0.2 E.U./dL 0.2 - 1.0 E.U./dL       Ordered the above labs and reviewed the results.        RADIOLOGY  Ct Abdomen Pelvis With Contrast    Result Date: 2/21/2020  Examination: CT of the abdomen and pelvis with contrast  HISTORY: 65-year-old female with a history of abdominal pain in the left upper and left lower quadrant  TECHNIQUE: Contiguous axial images were obtained through the abdomen and pelvis following intravenous administration of nonionic contrast. Oral contrast  was not administered. Radiation dose reduction techniques were utilized,  including automated exposure control and exposure modulation based on body size.  COMPARISON: CT of the abdomen without contrast, 6/22/2017  FINDINGS: The visualized portion of the lung basesare clear. The visualized portion of the heart has a normal appearance . The liver appears normal. The pancreas has a normal appearance. The kidneys appear normal. The adrenal glands have a normal appearance. The spleen appears normal. There is circumferential bowel wall thickening of the mid to distal portions of the descending colon with surrounding stranding. There is a diverticulum that is seen in the region with surrounding stranding. Shotty lymph nodes in the region are noted. The osseous structures of the lumbar spine and pelvis appear normal.      1.There is evidence for inflammation of the descending colon in the mid to distal portions with a diverticulum with some surrounding stranding. This is seen in conjunction with circumferential thickening of the mid to distal colon in this region. Differential concerns include diverticulitis versus colitis although colon carcinoma could have an imaging overlap. Clinical follow-up with colonoscopy following treatment of the acute abnormality should be considered.  This report was finalized on 2/21/2020 6:38 PM by Dr. Trey Zhou M.D.        Ordered the above noted radiological studies. Reviewed by me in PACS.  Spoke with Dr. Zhou (radiologist) regarding results.              MEDICATIONS GIVEN IN ER  Medications   iopamidol (ISOVUE-300) 61 % injection 85 mL (85 mL Intravenous Given by Other 2/21/20 1823)                     MEDICAL DECISION MAKING and ED Course         1707- HR 97. O2 sat 98% on RA. /85.  Notified pt of negative labs so far. Discussed with patient plan to obtain imaging of abdomen. Pt declines pain medicine. Pt understands and agrees with the plan, all questions answered. Ordered CT Abdomen for further evaluation.     1838- Rechecked pt. Pt is resting  comfortably. Notified pt of inflammation and diverticulum seen on CT Abdomen. Pt denies any family history of colon cancer. Discussed the plan to discharge the pt home with prescriptions for antibiotics, but pt declines prescriptions for pain medicine. I instructed the pt to follow up with GI for further evaluation by colonoscopy. Pt understands and agrees with the plan, all questions answered.        MDM  Number of Diagnoses or Management Options     Amount and/or Complexity of Data Reviewed  Clinical lab tests: ordered and reviewed  Tests in the radiology section of CPT®: ordered and reviewed  Discussion of test results with the performing providers: yes         65-year-old female with left upper and left lower quadrant abdominal pain without other associated symptoms.  Labs today are reassuring without significant leukocytosis.  Urinalysis is unremarkable.  CT the abdomen pelvis is suggestive of possible colitis versus diverticulitis, however I explained to her that an underlying colon mass could have similar appearance on CT and it is important that she follow-up with GI after completion of antibiotics for colonoscopy.  She reports that she had a normal colonoscopy 5 years ago and she denies family history of colon cancer.  Return precautions were discussed.  She declined a prescription for pain medication.      DIAGNOSIS  Final diagnoses:   Colitis         DISPOSITION  DISCHARGE    Patient discharged in stable condition.    Reviewed implications of results, diagnosis, meds, responsibility to follow up, warning signs and symptoms of possible worsening, potential complications and reasons to return to ER, including worsening symptoms.    Patient/Family voiced understanding of above instructions.    Discussed plan for discharge, as there is no emergent indication for admission. Patient referred to primary care provider for BP management due to today's BP. Pt/family is agreeable and understands need for follow up  and repeat testing.  Pt is aware that discharge does not mean that nothing is wrong but it indicates no emergency is present that requires admission and they must continue care with follow-up as given below or physician of their choice.     FOLLOW-UP  Uriah Ramirez MD  [address] [facility] [id number] [phone]    Schedule an appointment as soon as possible for a visit       Minh Del Rosario MD  [address] [facility]  SECOND FLOOR  [facility] [id number] [phone]    Schedule an appointment as soon as possible for a visit            Medication List      New Prescriptions    ciprofloxacin 500 MG tablet  Commonly known as:  CIPRO  Take 1 tablet by mouth 2 (Two) Times a Day for 7 days.     metroNIDAZOLE 500 MG tablet  Commonly known as:  FLAGYL  Take 1 tablet by mouth 2 (Two) Times a Day for 7 days.                      Latest Documented Vital Signs:  As of 10:32 PM  BP- 140/86 HR- 84 Temp- 98.6 °F (37 °C) (Tympanic) O2 sat- 98%        --  Documentation assistance provided by conrad Alfaro for Dr. ARANZA Burk MD.  Information recorded by the scribe was done at my direction and has been verified and validated by me.      Please note that portions of this were completed with a voice recognition program.          Ani Alfaro  02/21/20 1842       Rahul Burk MD  02/21/20 2232       Rahul Burk MD  02/21/20 2232

## 2020-02-21 NOTE — ED NOTES
Patient to er from home with c/o ABD pain for the last three days. Patient stated no nausea or vomiting.      Steven Lerner RN  02/21/20 [id number]

## 2020-02-21 NOTE — ED NOTES
Pt to ED with c/o LLQ pain x 4 days. Pt reports sharp at times. Poor po appetite. Denies n/v/d, blood in stools at this time.     Radhika Colorado RN  02/21/20 9152

## 2020-06-16 DIAGNOSIS — E07.9 DISORDER OF THYROID: ICD-10-CM

## 2020-06-16 DIAGNOSIS — E55.9 VITAMIN D DEFICIENCY: Primary | ICD-10-CM

## 2020-06-16 DIAGNOSIS — R63.5 ABNORMAL WEIGHT GAIN: ICD-10-CM

## 2020-06-16 DIAGNOSIS — E03.9 HYPOTHYROIDISM, UNSPECIFIED TYPE: ICD-10-CM

## 2020-06-19 LAB
25(OH)D3+25(OH)D2 SERPL-MCNC: 28.4 NG/ML (ref 30–100)
ALBUMIN SERPL-MCNC: 4.7 G/DL (ref 3.5–5.2)
ALBUMIN/GLOB SERPL: 1.5 G/DL
ALP SERPL-CCNC: 79 U/L (ref 39–117)
ALT SERPL-CCNC: 12 U/L (ref 1–33)
AST SERPL-CCNC: 19 U/L (ref 1–32)
BILIRUB SERPL-MCNC: 0.6 MG/DL (ref 0.2–1.2)
BUN SERPL-MCNC: 14 MG/DL (ref 8–23)
BUN/CREAT SERPL: 15.2 (ref 7–25)
CALCIUM SERPL-MCNC: 10.3 MG/DL (ref 8.6–10.5)
CHLORIDE SERPL-SCNC: 100 MMOL/L (ref 98–107)
CHOLEST SERPL-MCNC: 180 MG/DL (ref 0–200)
CO2 SERPL-SCNC: 28.4 MMOL/L (ref 22–29)
CREAT SERPL-MCNC: 0.92 MG/DL (ref 0.57–1)
GLOBULIN SER CALC-MCNC: 3.2 GM/DL
GLUCOSE SERPL-MCNC: 103 MG/DL (ref 65–99)
HDLC SERPL-MCNC: 47 MG/DL (ref 40–60)
INTERPRETATION: NORMAL
LDLC SERPL CALC-MCNC: 101 MG/DL (ref 0–100)
POTASSIUM SERPL-SCNC: 4.5 MMOL/L (ref 3.5–5.2)
PROT SERPL-MCNC: 7.9 G/DL (ref 6–8.5)
SODIUM SERPL-SCNC: 138 MMOL/L (ref 136–145)
T4 FREE SERPL-MCNC: 1.09 NG/DL (ref 0.93–1.7)
TRIGL SERPL-MCNC: 158 MG/DL (ref 0–150)
TSH SERPL DL<=0.005 MIU/L-ACNC: 1.41 UIU/ML (ref 0.27–4.2)
VLDLC SERPL CALC-MCNC: 31.6 MG/DL

## 2020-07-02 ENCOUNTER — OFFICE VISIT (OUTPATIENT)
Dept: ENDOCRINOLOGY | Age: 65
End: 2020-07-02

## 2020-07-02 VITALS
HEIGHT: 67 IN | RESPIRATION RATE: 20 BRPM | SYSTOLIC BLOOD PRESSURE: 142 MMHG | WEIGHT: 193.4 LBS | OXYGEN SATURATION: 95 % | HEART RATE: 90 BPM | DIASTOLIC BLOOD PRESSURE: 70 MMHG | BODY MASS INDEX: 30.35 KG/M2

## 2020-07-02 DIAGNOSIS — E55.9 VITAMIN D DEFICIENCY: Primary | ICD-10-CM

## 2020-07-02 DIAGNOSIS — R73.9 HYPERGLYCEMIA: ICD-10-CM

## 2020-07-02 DIAGNOSIS — R63.5 ABNORMAL WEIGHT GAIN: ICD-10-CM

## 2020-07-02 DIAGNOSIS — E03.9 HYPOTHYROIDISM, UNSPECIFIED TYPE: ICD-10-CM

## 2020-07-02 PROCEDURE — 99214 OFFICE O/P EST MOD 30 MIN: CPT | Performed by: INTERNAL MEDICINE

## 2020-07-02 RX ORDER — LEVOTHYROXINE SODIUM 88 UG/1
88 TABLET ORAL DAILY
Qty: 90 TABLET | Refills: 2 | Status: SHIPPED | OUTPATIENT
Start: 2020-07-02 | End: 2021-01-04 | Stop reason: SDUPTHER

## 2020-07-02 RX ORDER — ESTRADIOL 1 MG/1
1 TABLET ORAL DAILY
COMMUNITY
Start: 2020-06-10

## 2020-07-07 ENCOUNTER — RESULTS ENCOUNTER (OUTPATIENT)
Dept: ENDOCRINOLOGY | Age: 65
End: 2020-07-07

## 2020-07-07 DIAGNOSIS — R63.5 ABNORMAL WEIGHT GAIN: ICD-10-CM

## 2020-07-07 DIAGNOSIS — E03.9 HYPOTHYROIDISM, UNSPECIFIED TYPE: ICD-10-CM

## 2020-07-07 DIAGNOSIS — E55.9 VITAMIN D DEFICIENCY: ICD-10-CM

## 2020-07-07 DIAGNOSIS — R73.9 HYPERGLYCEMIA: ICD-10-CM

## 2020-07-10 ENCOUNTER — OFFICE VISIT (OUTPATIENT)
Dept: GASTROENTEROLOGY | Facility: CLINIC | Age: 65
End: 2020-07-10

## 2020-07-10 VITALS — WEIGHT: 196.9 LBS | TEMPERATURE: 98.2 F | BODY MASS INDEX: 30.9 KG/M2 | HEIGHT: 67 IN

## 2020-07-10 DIAGNOSIS — K57.92 DIVERTICULITIS: Primary | ICD-10-CM

## 2020-07-10 PROCEDURE — 99204 OFFICE O/P NEW MOD 45 MIN: CPT | Performed by: INTERNAL MEDICINE

## 2020-07-10 RX ORDER — SODIUM CHLORIDE, SODIUM LACTATE, POTASSIUM CHLORIDE, CALCIUM CHLORIDE 600; 310; 30; 20 MG/100ML; MG/100ML; MG/100ML; MG/100ML
30 INJECTION, SOLUTION INTRAVENOUS CONTINUOUS
Status: CANCELLED | OUTPATIENT
Start: 2020-08-06

## 2020-07-10 NOTE — PROGRESS NOTES
Chief Complaint   Patient presents with   • Abdominal Pain     Subjective   HPI  Carly De is a 65 y.o. female who presents today for new patient evaluation.    Patient was seen in the ER at this facility in February where she presented complaining of left lower quadrant pain.  Pain was relatively acute onset described as a twisting-like sensation.  She had some subjective fever.  In the emergency room she underwent a CT scan with results noted below.  She was treated with a course of dual antibiotics and had resolution of her symptoms.  She is not really had any significant GI issues since that time.    CT A/P 2/2020  IMPRESSION:  1.There is evidence for inflammation of the descending colon in the mid  to distal portions with a diverticulum with some surrounding stranding.  This is seen in conjunction with circumferential thickening of the mid  to distal colon in this region. Differential concerns include  diverticulitis versus colitis although colon carcinoma could have an  imaging overlap. Clinical follow-up with colonoscopy following treatment  of the acute abnormality should be considered.    Colonoscopy 2017 with Dr Patrick - hyperplastic polyps only      Past Medical History:   Diagnosis Date   • Arthritis    • Bursitis     RIGHT HIP   • Colon polyp 10/31/2017    Ascending colon: hyperplastic polyp, rectum: hyperplastic polyp   • Fever blister     OCCASIONALLY   • Hashimoto's thyroiditis    • Hypothyroidism    • Migraine    • Sleep apnea     Compliant w/ Bi-Pap       Current Outpatient Medications:   •  aspirin-acetaminophen-caffeine (EXCEDRIN MIGRAINE) 250-250-65 MG per tablet, Take 1 tablet by mouth Every 6 (Six) Hours As Needed for Headache., Disp: , Rfl:   •  Cholecalciferol (VITAMIN D) 2000 units tablet, Take 2,000 Units by mouth Daily., Disp: , Rfl:   •  estradiol (ESTRACE) 1 MG tablet, , Disp: , Rfl:   •  levothyroxine (SYNTHROID, LEVOTHROID) 88 MCG tablet, Take 1 tablet by mouth Daily., Disp: 90  tablet, Rfl: 2  •  medroxyPROGESTERone (PROVERA) 10 MG tablet, Take 10 mg by mouth Daily. Takes for one week after 12th week of estradiol patch., Disp: , Rfl:   •  meloxicam (MOBIC) 15 MG tablet, Take 15 mg by mouth Daily As Needed., Disp: , Rfl:   •  valACYclovir (VALTREX) 1000 MG tablet, TAKE 1 TABLET BY MOUTH TWICE DAILY, Disp: 30 tablet, Rfl: 0  •  vitamin C (ASCORBIC ACID) 500 MG tablet, Take 500 mg by mouth Daily., Disp: , Rfl:   No Known Allergies  Social History     Socioeconomic History   • Marital status:      Spouse name: Not on file   • Number of children: Not on file   • Years of education: Not on file   • Highest education level: Not on file   Tobacco Use   • Smoking status: Former Smoker     Years: 5.00     Types: Cigarettes     Last attempt to quit:      Years since quittin.5   • Smokeless tobacco: Never Used   Substance and Sexual Activity   • Alcohol use: Yes     Comment: SOCIAL   • Drug use: No   • Sexual activity: Defer     Family History   Problem Relation Age of Onset   • Hypertension Mother    • Diabetes Mother    • Malig Hyperthermia Neg Hx      Review of Systems   Gastrointestinal: Positive for abdominal pain.   All other systems reviewed and are negative.    Objective   Vitals:    07/10/20 1019   Temp: 98.2 °F (36.8 °C)     Physical Exam   Constitutional: She is oriented to person, place, and time. She appears well-developed and well-nourished.   HENT:   Head: Normocephalic and atraumatic.   Mouth/Throat: Oropharynx is clear and moist.   Eyes: EOM are normal. No scleral icterus.   Neck: Normal range of motion. Neck supple. No thyromegaly present.   Cardiovascular: Normal rate, regular rhythm and normal heart sounds. Exam reveals no gallop and no friction rub.   No murmur heard.  Pulmonary/Chest: Effort normal and breath sounds normal. She has no wheezes. She has no rales. She exhibits no tenderness.   Abdominal: Soft. Bowel sounds are normal. She exhibits no distension.  There is no tenderness. There is no rebound and no guarding. No hernia.   Musculoskeletal: Normal range of motion. She exhibits no edema.   Lymphadenopathy:     She has no cervical adenopathy.   Neurological: She is alert and oriented to person, place, and time.   Skin: Skin is warm and dry.   Psychiatric: She has a normal mood and affect. Judgment and thought content normal.   Vitals reviewed.    Assessment/Plan   Assessment:     1. Diverticulitis    2.      Abnormal CT scan of abdomen    Plan:   Clinically it would appear patient had episode of acute uncomplicated diverticulitis back in February.  She has not had any significant recurrence of symptoms since that time. I do think it would be prudent to update her colonoscopy however to ensure no residual inflammation or other process in the area of the L colon.  She is in agreement.  Also recommend daily fibers supplement and low red meat diet.            Minh Del Rosario M.D.  [facility]  [address] [place] [address]  Office: [phone]

## 2020-07-30 ENCOUNTER — TRANSCRIBE ORDERS (OUTPATIENT)
Dept: SLEEP MEDICINE | Facility: HOSPITAL | Age: 65
End: 2020-07-30

## 2020-07-30 DIAGNOSIS — Z01.818 OTHER SPECIFIED PRE-OPERATIVE EXAMINATION: Primary | ICD-10-CM

## 2020-08-04 ENCOUNTER — LAB (OUTPATIENT)
Dept: LAB | Facility: HOSPITAL | Age: 65
End: 2020-08-04

## 2020-08-04 DIAGNOSIS — Z01.818 OTHER SPECIFIED PRE-OPERATIVE EXAMINATION: ICD-10-CM

## 2020-08-04 PROCEDURE — U0004 COV-19 TEST NON-CDC HGH THRU: HCPCS

## 2020-08-04 PROCEDURE — C9803 HOPD COVID-19 SPEC COLLECT: HCPCS

## 2020-08-05 LAB
REF LAB TEST METHOD: NORMAL
SARS-COV-2 RNA RESP QL NAA+PROBE: NOT DETECTED

## 2020-08-06 ENCOUNTER — ANESTHESIA EVENT (OUTPATIENT)
Dept: GASTROENTEROLOGY | Facility: HOSPITAL | Age: 65
End: 2020-08-06

## 2020-08-06 ENCOUNTER — ANESTHESIA (OUTPATIENT)
Dept: GASTROENTEROLOGY | Facility: HOSPITAL | Age: 65
End: 2020-08-06

## 2020-08-06 ENCOUNTER — HOSPITAL ENCOUNTER (OUTPATIENT)
Facility: HOSPITAL | Age: 65
Setting detail: HOSPITAL OUTPATIENT SURGERY
Discharge: HOME | End: 2020-08-06
Attending: INTERNAL MEDICINE | Admitting: INTERNAL MEDICINE

## 2020-08-06 VITALS
HEIGHT: 67 IN | HEART RATE: 82 BPM | DIASTOLIC BLOOD PRESSURE: 97 MMHG | BODY MASS INDEX: 30.36 KG/M2 | WEIGHT: 193.44 LBS | SYSTOLIC BLOOD PRESSURE: 126 MMHG | RESPIRATION RATE: 16 BRPM | OXYGEN SATURATION: 98 % | TEMPERATURE: 96.6 F

## 2020-08-06 DIAGNOSIS — K57.92 DIVERTICULITIS: ICD-10-CM

## 2020-08-06 PROCEDURE — 25010000002 PROPOFOL 10 MG/ML EMULSION: Performed by: ANESTHESIOLOGY

## 2020-08-06 PROCEDURE — 88305 TISSUE EXAM BY PATHOLOGIST: CPT | Performed by: INTERNAL MEDICINE

## 2020-08-06 PROCEDURE — 45385 COLONOSCOPY W/LESION REMOVAL: CPT | Performed by: INTERNAL MEDICINE

## 2020-08-06 RX ORDER — LIDOCAINE HYDROCHLORIDE 20 MG/ML
INJECTION, SOLUTION INFILTRATION; PERINEURAL AS NEEDED
Status: DISCONTINUED | OUTPATIENT
Start: 2020-08-06 | End: 2020-08-06 | Stop reason: SURG

## 2020-08-06 RX ORDER — SODIUM CHLORIDE, SODIUM LACTATE, POTASSIUM CHLORIDE, CALCIUM CHLORIDE 600; 310; 30; 20 MG/100ML; MG/100ML; MG/100ML; MG/100ML
30 INJECTION, SOLUTION INTRAVENOUS CONTINUOUS
Status: DISCONTINUED | OUTPATIENT
Start: 2020-08-06 | End: 2020-08-06 | Stop reason: HOSPADM

## 2020-08-06 RX ORDER — PROPOFOL 10 MG/ML
VIAL (ML) INTRAVENOUS CONTINUOUS PRN
Status: DISCONTINUED | OUTPATIENT
Start: 2020-08-06 | End: 2020-08-06 | Stop reason: SURG

## 2020-08-06 RX ORDER — PROPOFOL 10 MG/ML
VIAL (ML) INTRAVENOUS AS NEEDED
Status: DISCONTINUED | OUTPATIENT
Start: 2020-08-06 | End: 2020-08-06 | Stop reason: SURG

## 2020-08-06 RX ORDER — SODIUM CHLORIDE 0.9 % (FLUSH) 0.9 %
3 SYRINGE (ML) INJECTION EVERY 12 HOURS SCHEDULED
Status: DISCONTINUED | OUTPATIENT
Start: 2020-08-06 | End: 2020-08-06 | Stop reason: HOSPADM

## 2020-08-06 RX ORDER — SODIUM CHLORIDE 0.9 % (FLUSH) 0.9 %
10 SYRINGE (ML) INJECTION AS NEEDED
Status: DISCONTINUED | OUTPATIENT
Start: 2020-08-06 | End: 2020-08-06 | Stop reason: HOSPADM

## 2020-08-06 RX ADMIN — LIDOCAINE HYDROCHLORIDE 50 MG: 20 INJECTION, SOLUTION INFILTRATION; PERINEURAL at 11:36

## 2020-08-06 RX ADMIN — SODIUM CHLORIDE, POTASSIUM CHLORIDE, SODIUM LACTATE AND CALCIUM CHLORIDE 30 ML/HR: 600; 310; 30; 20 INJECTION, SOLUTION INTRAVENOUS at 10:59

## 2020-08-06 RX ADMIN — PROPOFOL 140 MCG/KG/MIN: 10 INJECTION, EMULSION INTRAVENOUS at 11:38

## 2020-08-06 RX ADMIN — PROPOFOL 140 MG: 10 INJECTION, EMULSION INTRAVENOUS at 11:36

## 2020-08-06 NOTE — DISCHARGE INSTRUCTIONS
For the next 24 hours patient needs to be with a responsible adult.    For 24 hours DO NOT drive, operate machinery, appliances, drink alcohol, make important decisions or sign legal documents.    Start with a light or bland diet if you are feeling sick to your stomach otherwise advance to regular diet as tolerated.    Follow recommendations on procedure report if provided by your doctor.    Call Dr Del Rosario for problems     Problems may include but not limited to: large amounts of bleeding, trouble breathing, repeated vomiting, severe unrelieved pain, fever or chills.      Colonoscopy, Adult, Care After  This sheet gives you information about how to care for yourself after your procedure. Your doctor may also give you more specific instructions. If you have problems or questions, call your doctor.  What can I expect after the procedure?  After the procedure, it is common to have:  · A small amount of blood in your poop for 24 hours.  · Some gas.  · Mild cramping or bloating in your belly.  Follow these instructions at home:  General instructions  · For the first 24 hours after the procedure:  ? Do not drive or use machinery.  ? Do not sign important documents.  ? Do not drink alcohol.  ? Do your daily activities more slowly than normal.  ? Eat foods that are soft and easy to digest.  · Take over-the-counter or prescription medicines only as told by your doctor.  To help cramping and bloating:    · Try walking around.  · Put heat on your belly (abdomen) as told by your doctor. Use a heat source that your doctor recommends, such as a moist heat pack or a heating pad.  ? Put a towel between your skin and the heat source.  ? Leave the heat on for 20-30 minutes.  ? Remove the heat if your skin turns bright red. This is especially important if you cannot feel pain, heat, or cold. You can get burned.  Eating and drinking    · Drink enough fluid to keep your pee (urine) clear or pale yellow.  · Return to your normal diet  as told by your doctor. Avoid heavy or fried foods that are hard to digest.  · Avoid drinking alcohol for as long as told by your doctor.  Contact a doctor if:  · You have blood in your poop (stool) 2-3 days after the procedure.  Get help right away if:  · You have more than a small amount of blood in your poop.  · You see large clumps of tissue (blood clots) in your poop.  · Your belly is swollen.  · You feel sick to your stomach (nauseous).  · You throw up (vomit).  · You have a fever.  · You have belly pain that gets worse, and medicine does not help your pain.  Summary  · After the procedure, it is common to have a small amount of blood in your poop. You may also have mild cramping and bloating in your belly.  · For the first 24 hours after the procedure, do not drive or use machinery, do not sign important documents, and do not drink alcohol.  · Get help right away if you have a lot of blood in your poop, feel sick to your stomach, have a fever, or have more belly pain.  This information is not intended to replace advice given to you by your health care provider. Make sure you discuss any questions you have with your health care provider.  Document Released: 01/20/2012 Document Revised: 10/18/2018 Document Reviewed: 09/11/2017  [employer] Patient Education © 2020 [employer]  Colon Polyps    Polyps are tissue growths inside the body. Polyps can grow in many places, including the large intestine (colon). A polyp may be a round bump or a mushroom-shaped growth. You could have one polyp or several.  Most colon polyps are noncancerous (benign). However, some colon polyps can become cancerous over time. Finding and removing the polyps early can help prevent this.  What are the causes?  The exact cause of colon polyps is not known.  What increases the risk?  You are more likely to develop this condition if you:  · Have a family history of colon cancer or colon polyps.  · Are older than 50 or older than 45 if you are  .  · Have inflammatory bowel disease, such as ulcerative colitis or Crohn's disease.  · Have certain hereditary conditions, such as:  ? Familial adenomatous polyposis.  ? Covarrubias syndrome.  ? Turcot syndrome.  ? Peutz-Jeghers syndrome.  · Are overweight.  · Smoke cigarettes.  · Do not get enough exercise.  · Drink too much alcohol.  · Eat a diet that is high in fat and red meat and low in fiber.  · Had childhood cancer that was treated with abdominal radiation.  What are the signs or symptoms?  Most polyps do not cause symptoms.  If you have symptoms, they may include:  · Blood coming from your rectum when having a bowel movement.  · Blood in your stool. The stool may look dark red or black.  · Abdominal pain.  · A change in bowel habits, such as constipation or diarrhea.  How is this diagnosed?  This condition is diagnosed with a colonoscopy. This is a procedure in which a lighted, flexible scope is inserted into the anus and then passed into the colon to examine the area. Polyps are sometimes found when a colonoscopy is done as part of routine cancer screening tests.  How is this treated?  Treatment for this condition involves removing any polyps that are found. Most polyps can be removed during a colonoscopy. Those polyps will then be tested for cancer. Additional treatment may be needed depending on the results of testing.  Follow these instructions at home:  Lifestyle  · Maintain a healthy weight, or lose weight if recommended by your health care provider.  · Exercise every day or as told by your health care provider.  · Do not use any products that contain nicotine or tobacco, such as cigarettes and e-cigarettes. If you need help quitting, ask your health care provider.  · If you drink alcohol, limit how much you have:  ? 0-1 drink a day for women.  ? 0-2 drinks a day for men.  · Be aware of how much alcohol is in your drink. In the [place] one drink equals one 12 oz bottle of beer (355 mL), one 5  oz glass of wine (148 mL), or one 1½ oz shot of hard liquor (44 mL).  Eating and drinking    · Eat foods that are high in fiber, such as fruits, vegetables, and whole grains.  · Eat foods that are high in calcium and vitamin D, such as milk, cheese, yogurt, eggs, liver, fish, and broccoli.  · Limit foods that are high in fat, such as fried foods and desserts.  · Limit the amount of red meat and processed meat you eat, such as hot dogs, sausage, brewster, and lunch meats.  General instructions  · Keep all follow-up visits as told by your health care provider. This is important.  ? This includes having regularly scheduled colonoscopies.  ? Talk to your health care provider about when you need a colonoscopy.  Contact a health care provider if:  · You have new or worsening bleeding during a bowel movement.  · You have new or increased blood in your stool.  · You have a change in bowel habits.  · You lose weight for no known reason.  Summary  · Polyps are tissue growths inside the body. Polyps can grow in many places, including the colon.  · Most colon polyps are noncancerous (benign), but some can become cancerous over time.  · This condition is diagnosed with a colonoscopy.  · Treatment for this condition involves removing any polyps that are found. Most polyps can be removed during a colonoscopy.  This information is not intended to replace advice given to you by your health care provider. Make sure you discuss any questions you have with your health care provider.  Document Released: 09/13/2005 Document Revised: 04/04/2019 Document Reviewed: 04/04/2019  [employer] Patient Education © 2020 [employer]

## 2020-08-06 NOTE — ANESTHESIA PREPROCEDURE EVALUATION
Anesthesia Evaluation     Patient summary reviewed and Nursing notes reviewed   NPO Solid Status: > 8 hours  NPO Liquid Status: > 4 hours           Airway   Mallampati: II  TM distance: >3 FB  Neck ROM: full  No difficulty expected  Dental - normal exam     Pulmonary - normal exam    breath sounds clear to auscultation  (+) sleep apnea,   Cardiovascular - negative cardio ROS and normal exam    Rhythm: regular  Rate: normal        Neuro/Psych  (+) headaches,     GI/Hepatic/Renal/Endo    (+) obesity,   thyroid problem hypothyroidism    Musculoskeletal     Abdominal   (+) obese,    Substance History - negative use     OB/GYN negative ob/gyn ROS         Other   arthritis,                    Anesthesia Plan    ASA 2     MAC       Anesthetic plan, all risks, benefits, and alternatives have been provided, discussed and informed consent has been obtained with: patient.

## 2020-08-06 NOTE — ANESTHESIA POSTPROCEDURE EVALUATION
Patient: Carly De    Procedure Summary     Date:  08/06/20 [facility]    Anesthesia Start:  1133 Anesthesia Stop:  1159    Procedure:  COLONOSCOPY TO CECUM WITH COLD SNARE POLYPECTOMY (N/A ) Diagnosis:       Diverticulitis      (Diverticulitis [K57.92])    Surgeon:  Minh Del Rosario MD Provider:  Tracy De La Paz MD    Anesthesia Type:  MAC ASA Status:  2          Anesthesia Type: MAC    Vitals  Vitals Value Taken Time   /79 8/6/2020 12:08 PM   Temp     Pulse 83 8/6/2020 12:08 PM   Resp 16 8/6/2020 12:08 PM   SpO2 97 % 8/6/2020 12:08 PM           Post Anesthesia Care and Evaluation    Patient location during evaluation: PACU  Patient participation: complete - patient participated  Level of consciousness: awake and alert  Pain management: adequate  Airway patency: patent  Anesthetic complications: No anesthetic complications  PONV Status: none  Cardiovascular status: acceptable  Respiratory status: acceptable  Hydration status: acceptable

## 2020-08-07 LAB
CYTO UR: NORMAL
LAB AP CASE REPORT: NORMAL
PATH REPORT.FINAL DX SPEC: NORMAL
PATH REPORT.GROSS SPEC: NORMAL

## 2020-08-14 ENCOUNTER — TELEPHONE (OUTPATIENT)
Dept: GASTROENTEROLOGY | Facility: CLINIC | Age: 65
End: 2020-08-14

## 2020-08-14 NOTE — PROGRESS NOTES
The polyp(s) showed hyperplastic change, which is non-cancerous and not pre-cancerous. Follow-up colonoscopy in 5 years is advised.

## 2020-08-14 NOTE — TELEPHONE ENCOUNTER
----- Message from Minh Del Rosario MD sent at 8/14/2020 12:02 PM EDT -----  The polyp(s) showed hyperplastic change, which is non-cancerous and not pre-cancerous. Follow-up colonoscopy in 5 years is advised.

## 2020-08-20 ENCOUNTER — RESULTS ENCOUNTER (OUTPATIENT)
Dept: ENDOCRINOLOGY | Age: 65
End: 2020-08-20

## 2020-08-20 DIAGNOSIS — E03.9 HYPOTHYROIDISM, UNSPECIFIED TYPE: ICD-10-CM

## 2020-08-20 DIAGNOSIS — R63.5 ABNORMAL WEIGHT GAIN: ICD-10-CM

## 2020-08-20 DIAGNOSIS — E55.9 VITAMIN D DEFICIENCY: ICD-10-CM

## 2020-12-16 DIAGNOSIS — E55.9 VITAMIN D DEFICIENCY: Primary | ICD-10-CM

## 2020-12-16 DIAGNOSIS — R68.89 ABNORMAL ENDOCRINE LABORATORY TEST FINDING: ICD-10-CM

## 2020-12-16 DIAGNOSIS — E03.9 HYPOTHYROIDISM, UNSPECIFIED TYPE: ICD-10-CM

## 2020-12-16 DIAGNOSIS — R79.9 ABNORMAL FINDING OF BLOOD CHEMISTRY, UNSPECIFIED: ICD-10-CM

## 2020-12-16 DIAGNOSIS — E06.3 HASHIMOTO'S DISEASE: ICD-10-CM

## 2020-12-16 DIAGNOSIS — R73.9 HYPERGLYCEMIA: ICD-10-CM

## 2020-12-21 ENCOUNTER — RESULTS ENCOUNTER (OUTPATIENT)
Dept: ENDOCRINOLOGY | Age: 65
End: 2020-12-21

## 2020-12-21 DIAGNOSIS — E03.9 HYPOTHYROIDISM, UNSPECIFIED TYPE: ICD-10-CM

## 2020-12-21 DIAGNOSIS — R68.89 ABNORMAL ENDOCRINE LABORATORY TEST FINDING: ICD-10-CM

## 2020-12-21 DIAGNOSIS — R73.9 HYPERGLYCEMIA: ICD-10-CM

## 2020-12-21 DIAGNOSIS — E55.9 VITAMIN D DEFICIENCY: ICD-10-CM

## 2020-12-21 DIAGNOSIS — E06.3 HASHIMOTO'S DISEASE: ICD-10-CM

## 2020-12-21 DIAGNOSIS — R79.9 ABNORMAL FINDING OF BLOOD CHEMISTRY, UNSPECIFIED: ICD-10-CM

## 2020-12-23 LAB
25(OH)D3+25(OH)D2 SERPL-MCNC: 38 NG/ML (ref 30–100)
ALBUMIN SERPL-MCNC: 4.1 G/DL (ref 3.5–5.2)
ALBUMIN/GLOB SERPL: 1.3 G/DL
ALP SERPL-CCNC: 91 U/L (ref 39–117)
ALT SERPL-CCNC: 16 U/L (ref 1–33)
AST SERPL-CCNC: 15 U/L (ref 1–32)
BILIRUB SERPL-MCNC: 0.6 MG/DL (ref 0–1.2)
BUN SERPL-MCNC: 14 MG/DL (ref 8–23)
BUN/CREAT SERPL: 16.9 (ref 7–25)
CALCIUM SERPL-MCNC: 9 MG/DL (ref 8.6–10.5)
CHLORIDE SERPL-SCNC: 102 MMOL/L (ref 98–107)
CHOLEST SERPL-MCNC: 174 MG/DL (ref 0–200)
CO2 SERPL-SCNC: 28.2 MMOL/L (ref 22–29)
CREAT SERPL-MCNC: 0.83 MG/DL (ref 0.57–1)
GLOBULIN SER CALC-MCNC: 3.2 GM/DL
GLUCOSE SERPL-MCNC: 116 MG/DL (ref 65–99)
HDLC SERPL-MCNC: 49 MG/DL (ref 40–60)
INTERPRETATION: NORMAL
LDLC SERPL CALC-MCNC: 104 MG/DL (ref 0–100)
POTASSIUM SERPL-SCNC: 3.7 MMOL/L (ref 3.5–5.2)
PROT SERPL-MCNC: 7.3 G/DL (ref 6–8.5)
SODIUM SERPL-SCNC: 139 MMOL/L (ref 136–145)
T4 FREE SERPL-MCNC: 1.37 NG/DL (ref 0.93–1.7)
TRIGL SERPL-MCNC: 114 MG/DL (ref 0–150)
TSH SERPL DL<=0.005 MIU/L-ACNC: 0.49 UIU/ML (ref 0.27–4.2)
VLDLC SERPL CALC-MCNC: 21 MG/DL (ref 5–40)

## 2021-01-04 ENCOUNTER — OFFICE VISIT (OUTPATIENT)
Dept: ENDOCRINOLOGY | Age: 66
End: 2021-01-04

## 2021-01-04 VITALS
BODY MASS INDEX: 31.58 KG/M2 | HEART RATE: 86 BPM | WEIGHT: 201.2 LBS | OXYGEN SATURATION: 99 % | HEIGHT: 67 IN | DIASTOLIC BLOOD PRESSURE: 92 MMHG | SYSTOLIC BLOOD PRESSURE: 168 MMHG

## 2021-01-04 DIAGNOSIS — E03.9 ACQUIRED HYPOTHYROIDISM: Primary | ICD-10-CM

## 2021-01-04 DIAGNOSIS — Z86.32 HISTORY OF GESTATIONAL DIABETES MELLITUS (GDM): ICD-10-CM

## 2021-01-04 DIAGNOSIS — G47.30 SLEEP APNEA, UNSPECIFIED TYPE: ICD-10-CM

## 2021-01-04 DIAGNOSIS — Z83.3 FAMILY HISTORY OF DIABETES MELLITUS (DM): ICD-10-CM

## 2021-01-04 DIAGNOSIS — E55.9 VITAMIN D DEFICIENCY: ICD-10-CM

## 2021-01-04 DIAGNOSIS — R73.01 ELEVATED FASTING GLUCOSE: ICD-10-CM

## 2021-01-04 PROCEDURE — 99214 OFFICE O/P EST MOD 30 MIN: CPT | Performed by: INTERNAL MEDICINE

## 2021-01-04 RX ORDER — LEVOTHYROXINE SODIUM 88 UG/1
88 TABLET ORAL DAILY
Qty: 90 TABLET | Refills: 1 | Status: SHIPPED | OUTPATIENT
Start: 2021-01-04 | End: 2021-07-06 | Stop reason: SDUPTHER

## 2021-01-05 LAB
ALBUMIN SERPL-MCNC: 4.3 G/DL (ref 3.8–4.8)
ALBUMIN/GLOB SERPL: 1.4 {RATIO} (ref 1.2–2.2)
ALP SERPL-CCNC: 79 IU/L (ref 39–117)
ALT SERPL-CCNC: 15 IU/L (ref 0–32)
AST SERPL-CCNC: 17 IU/L (ref 0–40)
BILIRUB SERPL-MCNC: 0.5 MG/DL (ref 0–1.2)
BUN SERPL-MCNC: 13 MG/DL (ref 8–27)
BUN/CREAT SERPL: 17 (ref 12–28)
CALCIUM SERPL-MCNC: 9.5 MG/DL (ref 8.7–10.3)
CHLORIDE SERPL-SCNC: 102 MMOL/L (ref 96–106)
CO2 SERPL-SCNC: 24 MMOL/L (ref 20–29)
CREAT SERPL-MCNC: 0.78 MG/DL (ref 0.57–1)
GLOBULIN SER CALC-MCNC: 3.1 G/DL (ref 1.5–4.5)
GLUCOSE SERPL-MCNC: 101 MG/DL (ref 65–99)
HBA1C MFR BLD: 6.2 % (ref 4.8–5.6)
POTASSIUM SERPL-SCNC: 4.1 MMOL/L (ref 3.5–5.2)
PROT SERPL-MCNC: 7.4 G/DL (ref 6–8.5)
SODIUM SERPL-SCNC: 138 MMOL/L (ref 134–144)

## 2021-01-09 PROBLEM — R73.03 PREDIABETES: Status: ACTIVE | Noted: 2021-01-09

## 2021-01-27 ENCOUNTER — OFFICE VISIT (OUTPATIENT)
Dept: GASTROENTEROLOGY | Facility: CLINIC | Age: 66
End: 2021-01-27

## 2021-01-27 VITALS — WEIGHT: 197.2 LBS | BODY MASS INDEX: 30.95 KG/M2 | HEIGHT: 67 IN

## 2021-01-27 DIAGNOSIS — K57.92 DIVERTICULITIS: Primary | ICD-10-CM

## 2021-01-27 DIAGNOSIS — R10.32 LEFT LOWER QUADRANT ABDOMINAL PAIN: ICD-10-CM

## 2021-01-27 LAB
BASOPHILS # BLD AUTO: 0.06 10*3/MM3 (ref 0–0.2)
BASOPHILS NFR BLD AUTO: 1.3 % (ref 0–1.5)
EOSINOPHIL # BLD AUTO: 0.25 10*3/MM3 (ref 0–0.4)
EOSINOPHIL NFR BLD AUTO: 5.3 % (ref 0.3–6.2)
ERYTHROCYTE [DISTWIDTH] IN BLOOD BY AUTOMATED COUNT: 13.3 % (ref 12.3–15.4)
HCT VFR BLD AUTO: 40.4 % (ref 34–46.6)
HGB BLD-MCNC: 13.4 G/DL (ref 12–15.9)
IMM GRANULOCYTES # BLD AUTO: 0.01 10*3/MM3 (ref 0–0.05)
IMM GRANULOCYTES NFR BLD AUTO: 0.2 % (ref 0–0.5)
LYMPHOCYTES # BLD AUTO: 1.89 10*3/MM3 (ref 0.7–3.1)
LYMPHOCYTES NFR BLD AUTO: 40.2 % (ref 19.6–45.3)
MCH RBC QN AUTO: 26.5 PG (ref 26.6–33)
MCHC RBC AUTO-ENTMCNC: 33.2 G/DL (ref 31.5–35.7)
MCV RBC AUTO: 79.8 FL (ref 79–97)
MONOCYTES # BLD AUTO: 0.42 10*3/MM3 (ref 0.1–0.9)
MONOCYTES NFR BLD AUTO: 8.9 % (ref 5–12)
NEUTROPHILS # BLD AUTO: 2.07 10*3/MM3 (ref 1.7–7)
NEUTROPHILS NFR BLD AUTO: 44.1 % (ref 42.7–76)
NRBC BLD AUTO-RTO: 0 /100 WBC (ref 0–0.2)
PLATELET # BLD AUTO: 284 10*3/MM3 (ref 140–450)
RBC # BLD AUTO: 5.06 10*6/MM3 (ref 3.77–5.28)
WBC # BLD AUTO: 4.7 10*3/MM3 (ref 3.4–10.8)

## 2021-01-27 PROCEDURE — 99214 OFFICE O/P EST MOD 30 MIN: CPT | Performed by: INTERNAL MEDICINE

## 2021-01-27 RX ORDER — DICYCLOMINE HCL 20 MG
20 TABLET ORAL
Qty: 90 TABLET | Refills: 5 | Status: SHIPPED | OUTPATIENT
Start: 2021-01-27 | End: 2021-02-26

## 2021-01-27 NOTE — PROGRESS NOTES
Chief Complaint   Patient presents with   • Abdominal Pain     Subjective     HPI  Carly De is a 66 y.o. female who presents today for office follow up.     She has a history of acute uncomplicated diverticulitis last year.  She had follow-up colonoscopy showing evidence of left-sided diverticulosis with no other significant findings.  Since the latter part of last year she has had intermittent episodes of some vague left lower quadrant discomfort.  Symptoms are intermittent has not had any issues for the last few days.  No accompanying fevers or chills.  No significant change in bowel habit.    Objective   There were no vitals filed for this visit.    Physical Exam  Vitals signs reviewed.   Constitutional:       Appearance: She is well-developed.   HENT:      Head: Normocephalic and atraumatic.   Abdominal:      General: Bowel sounds are normal. There is no distension.      Palpations: Abdomen is soft. There is no mass.      Tenderness: There is no abdominal tenderness.      Hernia: No hernia is present.   Skin:     General: Skin is warm and dry.   Neurological:      Mental Status: She is alert and oriented to person, place, and time.   Psychiatric:         Behavior: Behavior normal.         Thought Content: Thought content normal.         Judgment: Judgment normal.       The following data was reviewed by: Minh Del Rosario MD on 01/27/2021:    Data reviewed: GI studies Colonoscopy 2020     Assessment/Plan   Assessment:     1. Diverticulitis    2. Left lower quadrant abdominal pain      Plan:   Check CBC today, clinically I do not think she has acute diverticulitis.  Trial of Bentyl 20mg QID PRN lower abdominal pain.  If pain becomes persistent, would have low threshold to obtain CT scan A/P.           Minh Del Rosario M.D.  [facility]  [address] [place] [address]  Office: [phone]

## 2021-02-15 ENCOUNTER — TELEPHONE (OUTPATIENT)
Dept: GASTROENTEROLOGY | Facility: CLINIC | Age: 66
End: 2021-02-15

## 2021-02-15 NOTE — TELEPHONE ENCOUNTER
Called pt and advised of Dr Del Rosario's note.  Pt verbalized understanding.     Pt states abdominal pain is better.  Advised pt to call back if she is having abdominal pain and we will send for a CT scan.    F/u appt 8/9/21 @2pm.

## 2021-02-15 NOTE — TELEPHONE ENCOUNTER
----- Message from Minh Del Rosario MD sent at 2/7/2021 10:15 AM EST -----  Normal CBC  Patient to call if she has worsening abdominal pain, would schedule for CT to assess for acute diverticulitis  FOllow up office otherwise in 6 mos

## 2021-03-04 RX ORDER — VALACYCLOVIR HYDROCHLORIDE 1 G/1
1000 TABLET, FILM COATED ORAL 2 TIMES DAILY
Qty: 30 TABLET | Refills: 0 | Status: SHIPPED | OUTPATIENT
Start: 2021-03-04 | End: 2022-03-06

## 2021-03-04 NOTE — TELEPHONE ENCOUNTER
Caller: Carly De    Relationship: Self    Best call back number: [phone]    Medication needed:   Requested Prescriptions     Pending Prescriptions Disp Refills   • valACYclovir (VALTREX) 1000 MG tablet 30 tablet 0     Sig: Take 1 tablet by mouth 2 (Two) Times a Day.       When do you need the refill by: 3-4-21    What details did the patient provide when requesting the medication: PATIENT HAS 2 DAYS LEFT       Does the patient have less than a 3 day supply:  [x] Yes  [] No    What is the patient's preferred pharmacy: [facility] [address] AT [facility] & ([address]) - [phone] [facility] [phone] FX

## 2021-03-19 ENCOUNTER — BULK ORDERING (OUTPATIENT)
Dept: CASE MANAGEMENT | Facility: OTHER | Age: 66
End: 2021-03-19

## 2021-03-19 DIAGNOSIS — Z23 IMMUNIZATION DUE: ICD-10-CM

## 2021-05-10 ENCOUNTER — OFFICE VISIT (OUTPATIENT)
Dept: FAMILY MEDICINE CLINIC | Facility: CLINIC | Age: 66
End: 2021-05-10

## 2021-05-10 VITALS
TEMPERATURE: 97.5 F | WEIGHT: 203 LBS | BODY MASS INDEX: 31.86 KG/M2 | SYSTOLIC BLOOD PRESSURE: 192 MMHG | RESPIRATION RATE: 18 BRPM | HEART RATE: 84 BPM | HEIGHT: 67 IN | DIASTOLIC BLOOD PRESSURE: 100 MMHG | OXYGEN SATURATION: 98 %

## 2021-05-10 DIAGNOSIS — Z78.0 POSTMENOPAUSAL: ICD-10-CM

## 2021-05-10 DIAGNOSIS — E66.09 CLASS 1 OBESITY DUE TO EXCESS CALORIES WITH SERIOUS COMORBIDITY AND BODY MASS INDEX (BMI) OF 31.0 TO 31.9 IN ADULT: ICD-10-CM

## 2021-05-10 DIAGNOSIS — I10 ESSENTIAL HYPERTENSION: ICD-10-CM

## 2021-05-10 DIAGNOSIS — E03.8 HYPOTHYROIDISM DUE TO HASHIMOTO'S THYROIDITIS: ICD-10-CM

## 2021-05-10 DIAGNOSIS — K30 INDIGESTION: ICD-10-CM

## 2021-05-10 DIAGNOSIS — L30.9 DERMATITIS: ICD-10-CM

## 2021-05-10 DIAGNOSIS — R60.0 BILATERAL LEG EDEMA: ICD-10-CM

## 2021-05-10 DIAGNOSIS — Z00.00 MEDICARE ANNUAL WELLNESS VISIT, INITIAL: Primary | ICD-10-CM

## 2021-05-10 DIAGNOSIS — E06.3 HYPOTHYROIDISM DUE TO HASHIMOTO'S THYROIDITIS: ICD-10-CM

## 2021-05-10 DIAGNOSIS — H61.21 IMPACTED CERUMEN OF RIGHT EAR: ICD-10-CM

## 2021-05-10 PROCEDURE — 99214 OFFICE O/P EST MOD 30 MIN: CPT | Performed by: NURSE PRACTITIONER

## 2021-05-10 PROCEDURE — G0438 PPPS, INITIAL VISIT: HCPCS | Performed by: NURSE PRACTITIONER

## 2021-05-10 RX ORDER — HYDROCHLOROTHIAZIDE 25 MG/1
25 TABLET ORAL DAILY
Qty: 30 TABLET | Refills: 1 | Status: SHIPPED | OUTPATIENT
Start: 2021-05-10 | End: 2021-08-09

## 2021-05-10 RX ORDER — OMEPRAZOLE 40 MG/1
40 CAPSULE, DELAYED RELEASE ORAL DAILY
Qty: 30 CAPSULE | Refills: 1 | Status: SHIPPED | OUTPATIENT
Start: 2021-05-10 | End: 2021-10-20 | Stop reason: SDUPTHER

## 2021-05-10 RX ORDER — DIAPER,BRIEF,INFANT-TODD,DISP
EACH MISCELLANEOUS 2 TIMES DAILY
Qty: 15 G | Refills: 1 | Status: SHIPPED | OUTPATIENT
Start: 2021-05-10 | End: 2021-10-29

## 2021-05-10 RX ORDER — DICYCLOMINE HCL 20 MG
TABLET ORAL
COMMUNITY
Start: 2021-04-08 | End: 2021-11-22

## 2021-05-10 NOTE — PROGRESS NOTES
"Chief Complaint  Difficulty Swallowing (feels like food gets stuck), soreness inside ears (bilateral), and due bone density    Subjective          Carly De presents to [facility]  History of Present Illness  C/o indigestion beginning 2 mo ago without trigger, states drinking water helps \"feels like food is caught\", no NV abd pain heartburn or diarrhea constipation, no OTC, sees GI Dr Del Rosario for chronic IBS on dicyclomine 10 mg but denies abd cramping, last colonoscopy 8/20, no prev EGD, with shoulder pain and takes mobic but not daily, last CT abd pelvis 02/20 and states prev thyroid US many years ago, sees [facility] Endo Dr Gillespie for chronic hypothyroid on levothyroxine 88 mcg daily last TSH nl 12/20  With elevated BP today and no hx of HTN meds, denies CP dizziness HA but some ankle swelling, notes some sodium in diet but doesn't add extra salt, weight gain 10 lbs since 08/20 and admits no regular exercise, fam hx mom, father, brothers HTN and heart disease  Sees [facility] Dr Nicolas request dexa screening, UTD mammo 03/21 no breast sx  C/o itching B earlobes, no personal hx of skin ca, OTC carmax not much help    Objective   Vital Signs:   BP (!) 192/100 (BP Location: Left arm, Patient Position: Sitting)   Pulse 84   Temp 97.5 °F (36.4 °C) (Infrared)   Resp 18   Ht 170.2 cm (67\")   Wt 92.1 kg (203 lb)   SpO2 98%   BMI 31.79 kg/m²     Physical Exam  Vitals reviewed.   Constitutional:       Appearance: She is well-developed.   HENT:      Head: Normocephalic and atraumatic.      Right Ear: Hearing normal. There is impacted cerumen.      Left Ear: Hearing and tympanic membrane normal.   Eyes:      Conjunctiva/sclera: Conjunctivae normal.      Pupils: Pupils are equal, round, and reactive to light.   Cardiovascular:      Rate and Rhythm: Normal rate and regular rhythm.      Pulses: Normal pulses.      Heart sounds: Normal heart sounds.   Pulmonary:      Effort: Pulmonary " effort is normal.      Breath sounds: Normal breath sounds.   Abdominal:      General: There is no distension.      Palpations: Abdomen is soft. There is no mass.      Tenderness: There is no abdominal tenderness. There is no right CVA tenderness, left CVA tenderness, guarding or rebound.      Hernia: No hernia is present.   Musculoskeletal:         General: Normal range of motion.      Cervical back: Normal range of motion.      Right lower leg: Edema (around lateral ankle) present.      Left lower leg: Edema (around lateral ankle) present.   Skin:     General: Skin is warm and dry.      Findings: Rash (excoriated patch B earlobes no dc or erythema) present.   Neurological:      Mental Status: She is alert and oriented to person, place, and time.   Psychiatric:         Mood and Affect: Mood normal.         Behavior: Behavior normal.         Thought Content: Thought content normal.         Judgment: Judgment normal.      R ear irrigated successfully TM visualized and hearing intact  Result Review :                 Assessment and Plan    Diagnoses and all orders for this visit:    1. Medicare annual wellness visit, initial (Primary)    2. Essential hypertension    3. Indigestion    4. Hypothyroidism due to Hashimoto's thyroiditis    5. Class 1 obesity due to excess calories with serious comorbidity and body mass index (BMI) of 31.0 to 31.9 in adult    6. Bilateral leg edema    7. Impacted cerumen of right ear    8. Dermatitis    9. Postmenopausal  -     DEXA Bone Density Axial; Future    Other orders  -     hydroCHLOROthiazide (HYDRODIURIL) 25 MG tablet; Take 1 tablet by mouth Daily.  Dispense: 30 tablet; Refill: 1  -     omeprazole (priLOSEC) 40 MG capsule; Take 1 capsule by mouth Daily. One PO daily for GERD  Dispense: 30 capsule; Refill: 1  -     hydrocortisone 1 % cream; Apply  topically to the appropriate area as directed 2 (Two) Times a Day.  Dispense: 15 g; Refill: 1        Follow Up   No follow-ups on  file.  Patient was given instructions and counseling regarding her condition or for health maintenance advice. Please see specific information pulled into the AVS if appropriate.   Medicare wellness today, start HCTZ 25 mg daily and gave DASH diet, Enc healthy diet and regular exercise for wt loss and BP control, RTC 2 weeks if BP remains elevated, pt to call Dr Del Rosario GI for eval EGD may have esophageal stenosis, trial omeprazole 40 mg daily, cont chronic dz meds and FU with endo, R ear irrigated successfully TM visualized and hearing intact, trial HC cream monitor earlobes and if persist will refer derm consult, order dexa

## 2021-05-10 NOTE — PATIENT INSTRUCTIONS
"https://www.nhlbi.nih.gov/files/docs/public/heart/dash_brief.pdf\">   DASH Eating Plan  DASH stands for Dietary Approaches to Stop Hypertension. The DASH eating plan is a healthy eating plan that has been shown to:  · Reduce high blood pressure (hypertension).  · Reduce your risk for type 2 diabetes, heart disease, and stroke.  · Help with weight loss.  What are tips for following this plan?  Reading food labels  · Check food labels for the amount of salt (sodium) per serving. Choose foods with less than 5 percent of the Daily Value of sodium. Generally, foods with less than 300 milligrams (mg) of sodium per serving fit into this eating plan.  · To find whole grains, look for the word \"whole\" as the first word in the ingredient list.  Shopping  · Buy products labeled as \"low-sodium\" or \"no salt added.\"  · Buy fresh foods. Avoid canned foods and pre-made or frozen meals.  Cooking  · Avoid adding salt when cooking. Use salt-free seasonings or herbs instead of table salt or sea salt. Check with your health care provider or pharmacist before using salt substitutes.  · Do not rowley foods. Cook foods using healthy methods such as baking, boiling, grilling, roasting, and broiling instead.  · Cook with heart-healthy oils, such as olive, canola, avocado, soybean, or sunflower oil.  Meal planning    · Eat a balanced diet that includes:  ? 4 or more servings of fruits and 4 or more servings of vegetables each day. Try to fill one-half of your plate with fruits and vegetables.  ? 6-8 servings of whole grains each day.  ? Less than 6 oz (170 g) of lean meat, poultry, or fish each day. A 3-oz (85-g) serving of meat is about the same size as a deck of cards. One egg equals 1 oz (28 g).  ? 2-3 servings of low-fat dairy each day. One serving is 1 cup (237 mL).  ? 1 serving of nuts, seeds, or beans 5 times each week.  ? 2-3 servings of heart-healthy fats. Healthy fats called omega-3 fatty acids are found in foods such as walnuts, " flaxseeds, fortified milks, and eggs. These fats are also found in cold-water fish, such as sardines, salmon, and mackerel.  · Limit how much you eat of:  ? Canned or prepackaged foods.  ? Food that is high in trans fat, such as some fried foods.  ? Food that is high in saturated fat, such as fatty meat.  ? Desserts and other sweets, sugary drinks, and other foods with added sugar.  ? Full-fat dairy products.  · Do not salt foods before eating.  · Do not eat more than 4 egg yolks a week.  · Try to eat at least 2 vegetarian meals a week.  · Eat more home-cooked food and less restaurant, buffet, and fast food.  Lifestyle  · When eating at a restaurant, ask that your food be prepared with less salt or no salt, if possible.  · If you drink alcohol:  ? Limit how much you use to:  § 0-1 drink a day for women who are not pregnant.  § 0-2 drinks a day for men.  ? Be aware of how much alcohol is in your drink. In the [place] one drink equals one 12 oz bottle of beer (355 mL), one 5 oz glass of wine (148 mL), or one 1½ oz glass of hard liquor (44 mL).  General information  · Avoid eating more than 2,300 mg of salt a day. If you have hypertension, you may need to reduce your sodium intake to 1,500 mg a day.  · Work with your health care provider to maintain a healthy body weight or to lose weight. Ask what an ideal weight is for you.  · Get at least 30 minutes of exercise that causes your heart to beat faster (aerobic exercise) most days of the week. Activities may include walking, swimming, or biking.  · Work with your health care provider or dietitian to adjust your eating plan to your individual calorie needs.  What foods should I eat?  Fruits  All fresh, dried, or frozen fruit. Canned fruit in natural juice (without added sugar).  Vegetables  Fresh or frozen vegetables (raw, steamed, roasted, or grilled). Low-sodium or reduced-sodium tomato and vegetable juice. Low-sodium or reduced-sodium tomato sauce and tomato paste.  Low-sodium or reduced-sodium canned vegetables.  Grains  Whole-grain or whole-wheat bread. Whole-grain or whole-wheat pasta. Brown rice. Oatmeal. Quinoa. Bulgur. Whole-grain and low-sodium cereals. Trish bread. Low-fat, low-sodium crackers. Whole-wheat flour tortillas.  Meats and other proteins  Skinless chicken or turkey. Ground chicken or turkey. Pork with fat trimmed off. Fish and seafood. Egg whites. Dried beans, peas, or lentils. Unsalted nuts, nut butters, and seeds. Unsalted canned beans. Lean cuts of beef with fat trimmed off. Low-sodium, lean precooked or cured meat, such as sausages or meat loaves.  Dairy  Low-fat (1%) or fat-free (skim) milk. Reduced-fat, low-fat, or fat-free cheeses. Nonfat, low-sodium ricotta or cottage cheese. Low-fat or nonfat yogurt. Low-fat, low-sodium cheese.  Fats and oils  Soft margarine without trans fats. Vegetable oil. Reduced-fat, low-fat, or light mayonnaise and salad dressings (reduced-sodium). Canola, safflower, olive, avocado, soybean, and sunflower oils. Avocado.  Seasonings and condiments  Herbs. Spices. Seasoning mixes without salt.  Other foods  Unsalted popcorn and pretzels. Fat-free sweets.  The items listed above may not be a complete list of foods and beverages you can eat. Contact a dietitian for more information.  What foods should I avoid?  Fruits  Canned fruit in a light or heavy syrup. Fried fruit. Fruit in cream or butter sauce.  Vegetables  Creamed or fried vegetables. Vegetables in a cheese sauce. Regular canned vegetables (not low-sodium or reduced-sodium). Regular canned tomato sauce and paste (not low-sodium or reduced-sodium). Regular tomato and vegetable juice (not low-sodium or reduced-sodium). Pickles. Olives.  Grains  Baked goods made with fat, such as croissants, muffins, or some breads. Dry pasta or rice meal packs.  Meats and other proteins  Fatty cuts of meat. Ribs. Fried meat. [place]. Bologna, salami, and other precooked or cured meats, such as  sausages or meat loaves. Fat from the back of a pig (fatback). Bratwurst. Salted nuts and seeds. Canned beans with added salt. Canned or smoked fish. Whole eggs or egg yolks. Chicken or turkey with skin.  Dairy  Whole or 2% milk, cream, and half-and-half. Whole or full-fat cream cheese. Whole-fat or sweetened yogurt. Full-fat cheese. Nondairy creamers. Whipped toppings. Processed cheese and cheese spreads.  Fats and oils  Butter. Stick margarine. Lard. Shortening. Ghee. [place] fat. Tropical oils, such as coconut, palm kernel, or palm oil.  Seasonings and condiments  Onion salt, garlic salt, seasoned salt, table salt, and sea salt. Worcestershire sauce. Tartar sauce. Barbecue sauce. Teriyaki sauce. Soy sauce, including reduced-sodium. Steak sauce. Canned and packaged gravies. Fish sauce. Oyster sauce. Cocktail sauce. Store-bought horseradish. Ketchup. Mustard. Meat flavorings and tenderizers. Bouillon cubes. Hot sauces. Pre-made or packaged marinades. Pre-made or packaged taco seasonings. Relishes. Regular salad dressings.  Other foods  Salted popcorn and pretzels.  The items listed above may not be a complete list of foods and beverages you should avoid. Contact a dietitian for more information.  Where to find more information  · National Heart, Lung, and Blood [place]: www.nhlbi.nih.gov  · American Heart Association: www.heart.org  · Academy of Nutrition and Dietetics: www.eatright.org  · National Kidney Foundation: www.kidney.org  Summary  · The DASH eating plan is a healthy eating plan that has been shown to reduce high blood pressure (hypertension). It may also reduce your risk for type 2 diabetes, heart disease, and stroke.  · When on the DASH eating plan, aim to eat more fresh fruits and vegetables, whole grains, lean proteins, low-fat dairy, and heart-healthy fats.  · With the DASH eating plan, you should limit salt (sodium) intake to 2,300 mg a day. If you have hypertension, you may need to reduce your  sodium intake to 1,500 mg a day.  · Work with your health care provider or dietitian to adjust your eating plan to your individual calorie needs.  This information is not intended to replace advice given to you by your health care provider. Make sure you discuss any questions you have with your health care provider.  Document Revised: 11/20/2020 Document Reviewed: 11/20/2020  [employer] Patient Education © 2021 [employer]    Advance Care Planning and Advance Directives     You make decisions on a daily basis - decisions about where you want to live, your career, your home, your life. Perhaps one of the most important decisions you face is your choice for future medical care. Take time to talk with your family and your healthcare team and start planning today.  Advance Care Planning is a process that can help you:  · Understand possible future healthcare decisions in light of your own experiences  · Reflect on those decision in light of your goals and values  · Discuss your decisions with those closest to you and the healthcare professionals that care for you  · Make a plan by creating a document that reflects your wishes    Surrogate Decision Maker  In the event of a medical emergency, which has left you unable to communicate or to make your own decisions, you would need someone to make decisions for you.  It is important to discuss your preferences for medical treatment with this person while you are in good health.     Qualities of a surrogate decision maker:  • Willing to take on this role and responsibility  • Knows what you want for future medical care  • Willing to follow your wishes even if they don't agree with them  • Able to make difficult medical decisions under stressful circumstances    Advance Directives  These are legal documents you can create that will guide your healthcare team and decision maker(s) when needed. These documents can be stored in the electronic medical record.    · Living Will - a  legal document to guide your care if you have a terminal condition or a serious illness and are unable to communicate. States vary by statute in document names/types, but most forms may include one or more of the following:        -  Directions regarding life-prolonging treatments        -  Directions regarding artificially provided nutrition/hydration        -  Choosing a healthcare decision maker        -  Direction regarding organ/tissue donation    · Durable Power of  for Healthcare - this document names an -in-fact to make medical decisions for you, but it may also allow this person to make personal and financial decisions for you. Please seek the advice of an  if you need this type of document.    **Advance Directives are not required and no one may discriminate against you if you do not sign one.    Medical Orders  Many states allow specific forms/orders signed by your physician to record your wishes for medical treatment in your current state of health. This form, signed in personal communication with your physician, addresses resuscitation and other medical interventions that you may or may not want.      For more information or to schedule a time with a [facility] Advance Care Planning Facilitator contact: [facility] or call [phone] and someone will contact you directly.    Medicare Wellness  Personal Prevention Plan of Service     Date of Office Visit:  05/10/2021  Encounter Provider:  JANUARY Marin  Place of Service:  [facility]  Patient Name: Carly De  :  1955    As part of the Medicare Wellness portion of your visit today, we are providing you with this personalized preventive plan of services (PPPS). This plan is based upon recommendations of the [place] Preventive Services Task Force (USPSTF) and the Advisory Committee on Immunization Practices (ACIP).    This lists the preventive care services  that should be considered, and provides dates of when you are due. Items listed as completed are up-to-date and do not require any further intervention.    Health Maintenance   Topic Date Due   • DXA SCAN  Never done   • COVID-19 Vaccine (1) Never done   • TDAP/TD VACCINES (1 - Tdap) Never done   • ZOSTER VACCINE (1 of 2) Never done   • HEPATITIS C SCREENING  Never done   • Pneumococcal Vaccine 65+ (1 of 1 - PPSV23) 01/27/2020   • INFLUENZA VACCINE  08/01/2021   • ANNUAL WELLNESS VISIT  05/10/2022   • PAP SMEAR  11/06/2022   • MAMMOGRAM  03/09/2023   • COLORECTAL CANCER SCREENING  08/06/2025       Orders Placed This Encounter   Procedures   • DEXA Bone Density Axial     Standing Status:   Future     Standing Expiration Date:   5/10/2022     Order Specific Question:   Is patient taking or have taken long term Glucocorticoid (steroids)?     Answer:   No     Order Specific Question:   Does the patient have rheumatoid arthritis?     Answer:   No     Order Specific Question:   Reason for Exam:     Answer:   osteoporisis screening     Order Specific Question:   Release to patient     Answer:   Immediate       No follow-ups on file.

## 2021-05-10 NOTE — PROGRESS NOTES
The ABCs of the Annual Wellness Visit  Initial Medicare Wellness Visit    Chief Complaint   Patient presents with   • Difficulty Swallowing     feels like food gets stuck   • soreness inside ears     bilateral   • due bone density     Subjective   History of Present Illness:  Carly De is a 66 y.o. female who presents for an Initial Medicare Wellness Visit.    HEALTH RISK ASSESSMENT    Recent Hospitalizations:  No hospitalization(s) within the last year.    Current Medical Providers:  Patient Care Team:  Uriah Ramirez MD as PCP - General  Saul Gillespie MD as Consulting Physician (Endocrinology)  Minh Del Rosario MD as Consulting Physician (Gastroenterology)  Demario Nicolas MD as Consulting Physician (Obstetrics and Gynecology)    Smoking Status:  Social History     Tobacco Use   Smoking Status Former Smoker   • Packs/day: 0.50   • Years: 5.00   • Pack years: 2.50   • Types: Cigarettes   • Quit date:    • Years since quittin.3   Smokeless Tobacco Never Used     Alcohol Consumption:  Social History     Substance and Sexual Activity   Alcohol Use Yes    Comment: SOCIAL     Depression Screen:   PHQ-2/PHQ-9 Depression Screening 5/10/2021   Little interest or pleasure in doing things 0   Feeling down, depressed, or hopeless 1   Trouble falling or staying asleep, or sleeping too much 0   Feeling tired or having little energy 0   Poor appetite or overeating 0   Feeling bad about yourself - or that you are a failure or have let yourself or your family down 0   Trouble concentrating on things, such as reading the newspaper or watching television 0   Moving or speaking so slowly that other people could have noticed. Or the opposite - being so fidgety or restless that you have been moving around a lot more than usual 0   Thoughts that you would be better off dead, or of hurting yourself in some way 0   Total Score 1   If you checked off any problems, how difficult have these problems made  it for you to do your work, take care of things at home, or get along with other people? Not difficult at all       Fall Risk Screen:  LIDIA Fall Risk Assessment was completed, and patient is at LOW risk for falls.Assessment completed on:5/10/2021    Health Habits and Functional and Cognitive Screening:  Functional & Cognitive Status 5/10/2021   Do you have difficulty preparing food and eating? No   Do you have difficulty bathing yourself, getting dressed or grooming yourself? No   Do you have difficulty using the toilet? No   Do you have difficulty moving around from place to place? No   Do you have trouble with steps or getting out of a bed or a chair? No   Current Diet Well Balanced Diet   Dental Exam Up to date   Eye Exam Not up to date   Exercise (times per week) 0 times per week   Current Exercises Include No Regular Exercise   Do you need help using the phone?  No   Are you deaf or do you have serious difficulty hearing?  No   Do you need help with transportation? No   Do you need help shopping? No   Do you need help preparing meals?  No   Do you need help with housework?  No   Do you need help with laundry? No   Do you need help taking your medications? No   Do you need help managing money? No   Do you ever drive or ride in a car without wearing a seat belt? No   Have you felt unusual stress, anger or loneliness in the last month? No   Who do you live with? Alone   If you need help, do you have trouble finding someone available to you? No   Have you been bothered in the last four weeks by sexual problems? No   Do you have difficulty concentrating, remembering or making decisions? No         Does the patient have evidence of cognitive impairment? No    Asprin use counseling:Does not need ASA (and currently is not on it)    Age-appropriate Screening Schedule:  Refer to the list below for future screening recommendations based on patient's age, sex and/or medical conditions. Orders for these recommended tests  are listed in the plan section. The patient has been provided with a written plan.    Health Maintenance   Topic Date Due   • DXA SCAN  Never done   • TDAP/TD VACCINES (1 - Tdap) Never done   • ZOSTER VACCINE (1 of 2) Never done   • INFLUENZA VACCINE  08/01/2021   • PAP SMEAR  11/06/2022   • MAMMOGRAM  03/09/2023          The following portions of the patient's history were reviewed and updated as appropriate: allergies, current medications, past family history, past medical history, past social history, past surgical history and problem list.    Outpatient Medications Prior to Visit   Medication Sig Dispense Refill   • aspirin-acetaminophen-caffeine (EXCEDRIN MIGRAINE) 250-250-65 MG per tablet Take 1 tablet by mouth Every 6 (Six) Hours As Needed for Headache.     • Cholecalciferol (VITAMIN D) 2000 units tablet Take 2,000 Units by mouth Daily.     • dicyclomine (BENTYL) 20 MG tablet      • estradiol (ESTRACE) 1 MG tablet Take 1 mg by mouth Daily.     • levothyroxine (SYNTHROID, LEVOTHROID) 88 MCG tablet Take 1 tablet by mouth Daily. 90 tablet 1   • medroxyPROGESTERone (PROVERA) 10 MG tablet Take 10 mg by mouth Daily. Takes for one week after 12th week of estradiol patch.     • meloxicam (MOBIC) 15 MG tablet Take 15 mg by mouth Daily As Needed.     • valACYclovir (VALTREX) 1000 MG tablet Take 1 tablet by mouth 2 (Two) Times a Day. 30 tablet 0   • vitamin C (ASCORBIC ACID) 500 MG tablet Take 500 mg by mouth Daily.       No facility-administered medications prior to visit.       Patient Active Problem List   Diagnosis   • Abnormal weight gain   • Lymphocytic thyroiditis   • Hoarseness   • Hypothyroidism   • Sleep apnea   • Vitamin D deficiency   • Colon cancer screening   • Elevated fasting glucose   • Diverticulitis   • Family history of diabetes mellitus (DM)   • History of gestational diabetes mellitus (GDM)   • Prediabetes       Advanced Care Planning:  ACP discussion was held with the patient during this visit.  "Patient does not have an advance directive, information provided.    Review of Systems    Compared to one year ago, the patient feels her physical health is the same.  Compared to one year ago, the patient feels her mental health is the same.    Reviewed chart for potential of high risk medication in the elderly: yes  Reviewed chart for potential of harmful drug interactions in the elderly:yes    Objective         Vitals:    05/10/21 1255   BP: (!) 192/100   BP Location: Left arm   Patient Position: Sitting   Pulse: 84   Resp: 18   Temp: 97.5 °F (36.4 °C)   TempSrc: Infrared   SpO2: 98%   Weight: 92.1 kg (203 lb)   Height: 170.2 cm (67\")   PainSc: 0-No pain       Body mass index is 31.79 kg/m².  Discussed the patient's BMI with her. The BMI is above average; BMI management plan is completed.    Physical Exam          Assessment/Plan   Medicare Risks and Personalized Health Plan  CMS Preventative Services Quick Reference  Advance Directive Discussion  Cardiovascular risk  Inactivity/Sedentary  Obesity/Overweight   Osteoporosis Risk    The above risks/problems have been discussed with the patient.  Pertinent information has been shared with the patient in the After Visit Summary.  Follow up plans and orders are seen below in the Assessment/Plan Section.    Diagnoses and all orders for this visit:    1. Medicare annual wellness visit, initial (Primary)    2. Essential hypertension    3. Indigestion    4. Hypothyroidism due to Hashimoto's thyroiditis    5. Class 1 obesity due to excess calories with serious comorbidity and body mass index (BMI) of 31.0 to 31.9 in adult    6. Bilateral leg edema    7. Impacted cerumen of right ear    8. Dermatitis    9. Postmenopausal  -     DEXA Bone Density Axial; Future    Other orders  -     hydroCHLOROthiazide (HYDRODIURIL) 25 MG tablet; Take 1 tablet by mouth Daily.  Dispense: 30 tablet; Refill: 1  -     omeprazole (priLOSEC) 40 MG capsule; Take 1 capsule by mouth Daily. One PO " daily for GERD  Dispense: 30 capsule; Refill: 1  -     hydrocortisone 1 % cream; Apply  topically to the appropriate area as directed 2 (Two) Times a Day.  Dispense: 15 g; Refill: 1      Follow Up:  No follow-ups on file.     An After Visit Summary and PPPS were given to the patient.

## 2021-06-21 DIAGNOSIS — E03.9 ACQUIRED HYPOTHYROIDISM: ICD-10-CM

## 2021-06-21 DIAGNOSIS — E55.9 VITAMIN D DEFICIENCY: ICD-10-CM

## 2021-06-21 DIAGNOSIS — R73.01 ELEVATED FASTING GLUCOSE: ICD-10-CM

## 2021-06-21 DIAGNOSIS — E06.3 HASHIMOTO'S DISEASE: ICD-10-CM

## 2021-06-21 DIAGNOSIS — R68.89 ABNORMAL ENDOCRINE LABORATORY TEST FINDING: ICD-10-CM

## 2021-06-21 DIAGNOSIS — E07.9 DISORDER OF THYROID: ICD-10-CM

## 2021-06-21 DIAGNOSIS — E03.9 HYPOTHYROIDISM, UNSPECIFIED TYPE: Primary | ICD-10-CM

## 2021-07-02 NOTE — PROGRESS NOTES
Subjective   Carly De is a 66 y.o. female.     F/u for hypothyroidism, vitamin d def, elevated fasting glucose, sleep apnea         Patient is a 65-year-old female who came in for follow-up.       She has primary hypothyroidism and is on levothyroxine 88 mcg/day.  She has no history of goiter or head/neck radiation therapy.  TSH done in June 2021 is normal at 0.834 with a normal free T4 at 1.23 ng per DL.     She has vitamin D deficiency and is on vitamin D 2000 units 7 tablets on Sundays.  25 hydroxy vitamin D done in June 2021 is normal at 41.8 ng/mL.     She has prediabetes.  She has history of gestational diabetes mellitus with one pregnancy.  Her mother and a brother have diabetes mellitus.  Her fasting glucose in December 2020 is elevated at 116 mg per DL.  She has nocturia 1-2 times a night.  She denies polyuria or polydipsia.  She has no significant weight change since January 2021.    Hemoglobin A1c done in January 2021 is elevated at 6.2%.  Fasting glucose done in June 2021 is elevated at 104 mg per DL.       She went into her natural menopause at age 55.  She is on Estrace 1 mg/day for 12 weeks and switches to Provera for 1 week and then restart on Estrace after her period stops.  She will have a bone density done this week.  She had a normal mammogram in September 2020.   She follows with her gynecologist.     She has sleep apnea and uses CPAP regularly.  She wakes up rested most of the time.    She has no history of hypertension.  She has been on hydrochlorothiazide 25 mg once a day as needed for pedal edema and last took it 1 week ago.     Her last colonoscopy was in August 2020 and a hyperplastic polyp was removed.  She denies bowel complaints.     She has not decided on Covid vaccine yet.        The following portions of the patient's history were reviewed and updated as appropriate: allergies, current medications, past family history, past medical history, past social history, past surgical  "history and problem list.    Review of Systems   Eyes: Negative for visual disturbance.   Respiratory: Negative for shortness of breath.    Cardiovascular: Negative for chest pain and palpitations.   Gastrointestinal: Negative.    Endocrine: Negative.    Genitourinary: Negative.    Musculoskeletal: Negative for myalgias.   Neurological: Negative for numbness.     Objective      Vitals:    07/06/21 1112   BP: (!) 152/102   BP Location: Right arm   Patient Position: Sitting   Cuff Size: Large Adult   Pulse: 68   SpO2: 98%   Weight: 90.9 kg (200 lb 6.4 oz)   Height: 170.2 cm (67.01\")     Physical Exam  Constitutional:       General: She is not in acute distress.     Appearance: Normal appearance. She is obese. She is not ill-appearing.   Eyes:      General:         Right eye: No discharge.         Left eye: No discharge.      Extraocular Movements: Extraocular movements intact.      Conjunctiva/sclera: Conjunctivae normal.   Neck:      Vascular: No carotid bruit.   Cardiovascular:      Rate and Rhythm: Normal rate and regular rhythm.      Pulses: Normal pulses.      Heart sounds: No murmur heard.   No friction rub.   Pulmonary:      Effort: No respiratory distress.      Breath sounds: Normal breath sounds. No stridor. No wheezing, rhonchi or rales.   Chest:      Chest wall: No tenderness.   Abdominal:      General: Bowel sounds are normal.      Palpations: Abdomen is soft. There is no mass.      Tenderness: There is no abdominal tenderness. There is no right CVA tenderness or left CVA tenderness.   Musculoskeletal:         General: No swelling or tenderness. Normal range of motion.      Cervical back: Neck supple. No rigidity or tenderness.   Lymphadenopathy:      Cervical: No cervical adenopathy.   Neurological:      Mental Status: She is alert and oriented to person, place, and time.      Comments: Intact light touch in lower extremities   Psychiatric:         Mood and Affect: Mood normal.         Behavior: Behavior " normal.       Office Visit on 01/27/2021   Component Date Value Ref Range Status   • WBC 01/27/2021 4.70  3.40 - 10.80 10*3/mm3 Final   • RBC 01/27/2021 5.06  3.77 - 5.28 10*6/mm3 Final   • Hemoglobin 01/27/2021 13.4  12.0 - 15.9 g/dL Final   • Hematocrit 01/27/2021 40.4  34.0 - 46.6 % Final   • MCV 01/27/2021 79.8  79.0 - 97.0 fL Final   • MCH 01/27/2021 26.5* 26.6 - 33.0 pg Final   • MCHC 01/27/2021 33.2  31.5 - 35.7 g/dL Final   • RDW 01/27/2021 13.3  12.3 - 15.4 % Final   • Platelets 01/27/2021 284  140 - 450 10*3/mm3 Final   • Neutrophil Rel % 01/27/2021 44.1  42.7 - 76.0 % Final   • Lymphocyte Rel % 01/27/2021 40.2  19.6 - 45.3 % Final   • Monocyte Rel % 01/27/2021 8.9  5.0 - 12.0 % Final   • Eosinophil Rel % 01/27/2021 5.3  0.3 - 6.2 % Final   • Basophil Rel % 01/27/2021 1.3  0.0 - 1.5 % Final   • Neutrophils Absolute 01/27/2021 2.07  1.70 - 7.00 10*3/mm3 Final   • Lymphocytes Absolute 01/27/2021 1.89  0.70 - 3.10 10*3/mm3 Final   • Monocytes Absolute 01/27/2021 0.42  0.10 - 0.90 10*3/mm3 Final   • Eosinophils Absolute 01/27/2021 0.25  0.00 - 0.40 10*3/mm3 Final   • Basophils Absolute 01/27/2021 0.06  0.00 - 0.20 10*3/mm3 Final   • Immature Granulocyte Rel % 01/27/2021 0.2  0.0 - 0.5 % Final   • Immature Grans Absolute 01/27/2021 0.01  0.00 - 0.05 10*3/mm3 Final   • nRBC 01/27/2021 0.0  0.0 - 0.2 /100 WBC Final     Assessment/Plan   Diagnoses and all orders for this visit:    1. Hypothyroidism due to Hashimoto's thyroiditis (Primary)    2. Sleep apnea, unspecified type    3. Vitamin D deficiency    4. Elevated fasting glucose    5. Family history of diabetes mellitus (DM)    6. Prediabetes    7. Essential hypertension    Other orders  -     levothyroxine (SYNTHROID, LEVOTHROID) 88 MCG tablet; Take 1 tablet by mouth Daily.  Dispense: 90 tablet; Refill: 1      Continue levothyroxine 88 mcg/day.  Continue CPAP.  Continue vitamin D3 2000 units 7 tablets on Sundays.  Patient has prediabetes.  Try to lose 5 to 10%  of current weight with diet and exercise.  Appointment with diabetes educator  She has hypertension.  Start hydrochlorothiazide 25 mg daily.  BP checks at home.  Follow-up with PCP if BP remains 140 systolic or higher.  Advised to get Covid vaccine.    Copy of my note sent to Julianna Glez NP    RTC 4 mos.

## 2021-07-06 ENCOUNTER — OFFICE VISIT (OUTPATIENT)
Dept: ENDOCRINOLOGY | Age: 66
End: 2021-07-06

## 2021-07-06 VITALS
HEIGHT: 67 IN | WEIGHT: 200.4 LBS | DIASTOLIC BLOOD PRESSURE: 102 MMHG | HEART RATE: 68 BPM | SYSTOLIC BLOOD PRESSURE: 152 MMHG | BODY MASS INDEX: 31.45 KG/M2 | OXYGEN SATURATION: 98 %

## 2021-07-06 DIAGNOSIS — R73.01 ELEVATED FASTING GLUCOSE: ICD-10-CM

## 2021-07-06 DIAGNOSIS — E03.8 HYPOTHYROIDISM DUE TO HASHIMOTO'S THYROIDITIS: Primary | ICD-10-CM

## 2021-07-06 DIAGNOSIS — E55.9 VITAMIN D DEFICIENCY: ICD-10-CM

## 2021-07-06 DIAGNOSIS — G47.30 SLEEP APNEA, UNSPECIFIED TYPE: ICD-10-CM

## 2021-07-06 DIAGNOSIS — Z83.3 FAMILY HISTORY OF DIABETES MELLITUS (DM): ICD-10-CM

## 2021-07-06 DIAGNOSIS — I10 ESSENTIAL HYPERTENSION: ICD-10-CM

## 2021-07-06 DIAGNOSIS — E06.3 HYPOTHYROIDISM DUE TO HASHIMOTO'S THYROIDITIS: Primary | ICD-10-CM

## 2021-07-06 DIAGNOSIS — R73.03 PREDIABETES: ICD-10-CM

## 2021-07-06 PROCEDURE — 99214 OFFICE O/P EST MOD 30 MIN: CPT | Performed by: INTERNAL MEDICINE

## 2021-07-06 RX ORDER — HYDROXYZINE HYDROCHLORIDE 25 MG/1
TABLET, FILM COATED ORAL
COMMUNITY
Start: 2021-05-24 | End: 2021-10-29

## 2021-07-06 RX ORDER — LEVOTHYROXINE SODIUM 88 UG/1
88 TABLET ORAL DAILY
Qty: 90 TABLET | Refills: 1 | Status: SHIPPED | OUTPATIENT
Start: 2021-07-06 | End: 2022-01-25

## 2021-07-08 ENCOUNTER — HOSPITAL ENCOUNTER (OUTPATIENT)
Dept: BONE DENSITY | Facility: HOSPITAL | Age: 66
Discharge: HOME | End: 2021-07-08
Admitting: NURSE PRACTITIONER

## 2021-07-08 DIAGNOSIS — Z78.0 POSTMENOPAUSAL: ICD-10-CM

## 2021-07-08 PROCEDURE — 77080 DXA BONE DENSITY AXIAL: CPT

## 2021-08-09 ENCOUNTER — OFFICE VISIT (OUTPATIENT)
Dept: GASTROENTEROLOGY | Facility: CLINIC | Age: 66
End: 2021-08-09

## 2021-08-09 ENCOUNTER — TELEPHONE (OUTPATIENT)
Dept: GASTROENTEROLOGY | Facility: CLINIC | Age: 66
End: 2021-08-09

## 2021-08-09 VITALS — HEIGHT: 67 IN | WEIGHT: 199.1 LBS | BODY MASS INDEX: 31.25 KG/M2 | TEMPERATURE: 97.5 F

## 2021-08-09 DIAGNOSIS — R13.10 DYSPHAGIA, UNSPECIFIED TYPE: Primary | ICD-10-CM

## 2021-08-09 PROCEDURE — 99214 OFFICE O/P EST MOD 30 MIN: CPT | Performed by: INTERNAL MEDICINE

## 2021-09-01 ENCOUNTER — HOSPITAL ENCOUNTER (OUTPATIENT)
Dept: DIABETES SERVICES | Facility: HOSPITAL | Age: 66
Discharge: HOME | End: 2021-09-01
Admitting: INTERNAL MEDICINE

## 2021-09-01 ENCOUNTER — TRANSCRIBE ORDERS (OUTPATIENT)
Dept: SLEEP MEDICINE | Facility: HOSPITAL | Age: 66
End: 2021-09-01

## 2021-09-01 DIAGNOSIS — Z01.818 OTHER SPECIFIED PRE-OPERATIVE EXAMINATION: Primary | ICD-10-CM

## 2021-09-01 PROCEDURE — 97802 MEDICAL NUTRITION INDIV IN: CPT

## 2021-09-07 ENCOUNTER — LAB (OUTPATIENT)
Dept: LAB | Facility: HOSPITAL | Age: 66
End: 2021-09-07

## 2021-09-07 DIAGNOSIS — Z01.818 OTHER SPECIFIED PRE-OPERATIVE EXAMINATION: ICD-10-CM

## 2021-09-07 PROCEDURE — U0005 INFEC AGEN DETEC AMPLI PROBE: HCPCS

## 2021-09-07 PROCEDURE — U0004 COV-19 TEST NON-CDC HGH THRU: HCPCS

## 2021-09-07 PROCEDURE — C9803 HOPD COVID-19 SPEC COLLECT: HCPCS

## 2021-09-08 ENCOUNTER — ANESTHESIA EVENT (OUTPATIENT)
Dept: GASTROENTEROLOGY | Facility: HOSPITAL | Age: 66
End: 2021-09-08

## 2021-09-08 ENCOUNTER — ANESTHESIA (OUTPATIENT)
Dept: GASTROENTEROLOGY | Facility: HOSPITAL | Age: 66
End: 2021-09-08

## 2021-09-08 ENCOUNTER — HOSPITAL ENCOUNTER (OUTPATIENT)
Facility: HOSPITAL | Age: 66
Setting detail: HOSPITAL OUTPATIENT SURGERY
Discharge: HOME | End: 2021-09-08
Attending: INTERNAL MEDICINE | Admitting: INTERNAL MEDICINE

## 2021-09-08 VITALS
SYSTOLIC BLOOD PRESSURE: 147 MMHG | DIASTOLIC BLOOD PRESSURE: 87 MMHG | HEART RATE: 80 BPM | BODY MASS INDEX: 30.89 KG/M2 | TEMPERATURE: 98.4 F | RESPIRATION RATE: 14 BRPM | WEIGHT: 197.25 LBS | OXYGEN SATURATION: 99 %

## 2021-09-08 DIAGNOSIS — R13.10 DYSPHAGIA, UNSPECIFIED TYPE: ICD-10-CM

## 2021-09-08 LAB — SARS-COV-2 ORF1AB RESP QL NAA+PROBE: NOT DETECTED

## 2021-09-08 PROCEDURE — 25010000002 PROPOFOL 10 MG/ML EMULSION: Performed by: NURSE ANESTHETIST, CERTIFIED REGISTERED

## 2021-09-08 PROCEDURE — 88305 TISSUE EXAM BY PATHOLOGIST: CPT | Performed by: INTERNAL MEDICINE

## 2021-09-08 PROCEDURE — 43239 EGD BIOPSY SINGLE/MULTIPLE: CPT | Performed by: INTERNAL MEDICINE

## 2021-09-08 RX ORDER — SODIUM CHLORIDE, SODIUM LACTATE, POTASSIUM CHLORIDE, CALCIUM CHLORIDE 600; 310; 30; 20 MG/100ML; MG/100ML; MG/100ML; MG/100ML
30 INJECTION, SOLUTION INTRAVENOUS CONTINUOUS PRN
Status: DISCONTINUED | OUTPATIENT
Start: 2021-09-08 | End: 2021-09-08 | Stop reason: HOSPADM

## 2021-09-08 RX ORDER — LIDOCAINE HYDROCHLORIDE 20 MG/ML
INJECTION, SOLUTION INFILTRATION; PERINEURAL AS NEEDED
Status: DISCONTINUED | OUTPATIENT
Start: 2021-09-08 | End: 2021-09-08 | Stop reason: SURG

## 2021-09-08 RX ORDER — PROPOFOL 10 MG/ML
VIAL (ML) INTRAVENOUS AS NEEDED
Status: DISCONTINUED | OUTPATIENT
Start: 2021-09-08 | End: 2021-09-08 | Stop reason: SURG

## 2021-09-08 RX ORDER — PROPOFOL 10 MG/ML
VIAL (ML) INTRAVENOUS CONTINUOUS PRN
Status: DISCONTINUED | OUTPATIENT
Start: 2021-09-08 | End: 2021-09-08 | Stop reason: SURG

## 2021-09-08 RX ADMIN — SODIUM CHLORIDE, POTASSIUM CHLORIDE, SODIUM LACTATE AND CALCIUM CHLORIDE 30 ML/HR: 600; 310; 30; 20 INJECTION, SOLUTION INTRAVENOUS at 12:35

## 2021-09-08 RX ADMIN — PROPOFOL 80 MG: 10 INJECTION, EMULSION INTRAVENOUS at 12:58

## 2021-09-08 RX ADMIN — Medication 200 MCG/KG/MIN: at 12:58

## 2021-09-08 RX ADMIN — LIDOCAINE HYDROCHLORIDE 100 MG: 20 INJECTION, SOLUTION INFILTRATION; PERINEURAL at 12:58

## 2021-09-08 NOTE — ANESTHESIA PREPROCEDURE EVALUATION
Anesthesia Evaluation     Patient summary reviewed and Nursing notes reviewed   no history of anesthetic complications:  NPO Solid Status: > 8 hours  NPO Liquid Status: > 2 hours           Airway   Mallampati: II  TM distance: >3 FB  Neck ROM: full  No difficulty expected  Dental      Pulmonary    (+) sleep apnea,   Cardiovascular   Exercise tolerance: good (4-7 METS)    (+) hypertension,       Neuro/Psych  GI/Hepatic/Renal/Endo    (+) obesity,   thyroid problem hypothyroidism    ROS Comment: Dysphagia    Musculoskeletal     Abdominal    Substance History      OB/GYN          Other                        Anesthesia Plan    ASA 3     MAC     intravenous induction     Anesthetic plan, all risks, benefits, and alternatives have been provided, discussed and informed consent has been obtained with: patient.

## 2021-09-08 NOTE — ANESTHESIA POSTPROCEDURE EVALUATION
Patient: Carly De    Procedure Summary     Date: 09/08/21 Room / Location:  [facility]    Anesthesia Start: 1255 Anesthesia Stop: 1320    Procedure: ESOPHAGOGASTRODUODENOSCOPY WITH COLD BIOPSIES (N/A Esophagus) Diagnosis:       Dysphagia, unspecified type      (Dysphagia, unspecified type [R13.10])    Surgeons: Minh Del Rosario MD Provider: Aiden Leavitt MD    Anesthesia Type: MAC ASA Status: 3          Anesthesia Type: MAC    Vitals  Vitals Value Taken Time   /87 09/08/21 1336   Temp     Pulse 80 09/08/21 1336   Resp 14 09/08/21 1336   SpO2 99 % 09/08/21 1336           Post Anesthesia Care and Evaluation    Patient location during evaluation: bedside  Patient participation: complete - patient participated  Level of consciousness: awake  Pain management: adequate  Airway patency: patent  Anesthetic complications: No anesthetic complications    Cardiovascular status: acceptable  Respiratory status: acceptable  Hydration status: acceptable

## 2021-09-13 NOTE — PROGRESS NOTES
Candida in esophagus  Fluconazole 200mg x 1 day, then 100mg x 13 days to follow    Office f/u in 6 weeks

## 2021-09-15 ENCOUNTER — TELEPHONE (OUTPATIENT)
Dept: GASTROENTEROLOGY | Facility: CLINIC | Age: 66
End: 2021-09-15

## 2021-09-15 NOTE — TELEPHONE ENCOUNTER
Called pt and left detailed msg on identified vm.  Advised to call back if any questions and to make a f/u appt.     Called in rx to pt's [facility] pharmacy.

## 2021-09-15 NOTE — TELEPHONE ENCOUNTER
----- Message from Minh Del Rosario MD sent at 9/13/2021  7:01 AM EDT -----  Candida in esophagus  Fluconazole 200mg x 1 day, then 100mg x 13 days to follow    Office f/u in 6 weeks

## 2021-10-20 RX ORDER — OMEPRAZOLE 40 MG/1
40 CAPSULE, DELAYED RELEASE ORAL DAILY
Qty: 30 CAPSULE | Refills: 1 | Status: SHIPPED | OUTPATIENT
Start: 2021-10-20 | End: 2022-03-24

## 2021-10-29 ENCOUNTER — OFFICE VISIT (OUTPATIENT)
Dept: FAMILY MEDICINE CLINIC | Facility: CLINIC | Age: 66
End: 2021-10-29

## 2021-10-29 VITALS
OXYGEN SATURATION: 97 % | DIASTOLIC BLOOD PRESSURE: 90 MMHG | BODY MASS INDEX: 30.92 KG/M2 | HEART RATE: 76 BPM | SYSTOLIC BLOOD PRESSURE: 130 MMHG | RESPIRATION RATE: 18 BRPM | WEIGHT: 197 LBS | TEMPERATURE: 97.1 F | HEIGHT: 67 IN

## 2021-10-29 DIAGNOSIS — E66.9 OBESITY (BMI 30-39.9): ICD-10-CM

## 2021-10-29 DIAGNOSIS — R30.0 DYSURIA: Primary | ICD-10-CM

## 2021-10-29 DIAGNOSIS — R73.03 PREDIABETES: ICD-10-CM

## 2021-10-29 DIAGNOSIS — I10 ESSENTIAL HYPERTENSION: ICD-10-CM

## 2021-10-29 DIAGNOSIS — Z23 NEED FOR INFLUENZA VACCINATION: ICD-10-CM

## 2021-10-29 DIAGNOSIS — E06.3 HYPOTHYROIDISM DUE TO HASHIMOTO'S THYROIDITIS: ICD-10-CM

## 2021-10-29 DIAGNOSIS — K21.9 GASTROESOPHAGEAL REFLUX DISEASE WITHOUT ESOPHAGITIS: ICD-10-CM

## 2021-10-29 DIAGNOSIS — E03.8 HYPOTHYROIDISM DUE TO HASHIMOTO'S THYROIDITIS: ICD-10-CM

## 2021-10-29 DIAGNOSIS — N95.1 MENOPAUSE SYNDROME: ICD-10-CM

## 2021-10-29 LAB
BILIRUB BLD-MCNC: NEGATIVE MG/DL
CLARITY, POC: CLEAR
COLOR UR: YELLOW
EXPIRATION DATE: ABNORMAL
GLUCOSE UR STRIP-MCNC: NEGATIVE MG/DL
KETONES UR QL: NEGATIVE
LEUKOCYTE EST, POC: NEGATIVE
Lab: 5022
NITRITE UR-MCNC: NEGATIVE MG/ML
PH UR: 6 [PH] (ref 5–8)
PROT UR STRIP-MCNC: NEGATIVE MG/DL
RBC # UR STRIP: ABNORMAL /UL
SP GR UR: 1.02 (ref 1–1.03)
UROBILINOGEN UR QL: NORMAL

## 2021-10-29 PROCEDURE — G0008 ADMIN INFLUENZA VIRUS VAC: HCPCS | Performed by: FAMILY MEDICINE

## 2021-10-29 PROCEDURE — 90662 IIV NO PRSV INCREASED AG IM: CPT | Performed by: FAMILY MEDICINE

## 2021-10-29 PROCEDURE — 81003 URINALYSIS AUTO W/O SCOPE: CPT | Performed by: FAMILY MEDICINE

## 2021-10-29 PROCEDURE — 99214 OFFICE O/P EST MOD 30 MIN: CPT | Performed by: FAMILY MEDICINE

## 2021-10-29 NOTE — PROGRESS NOTES
HPI  Carly De is a 66 y.o. female who is here to establish a new primary care physician.  Previous primary care physician apparently retiring or at school part-time.  Patient is followed by endocrinologist as well as gastroenterologist.  Also followed by gynecologist.  Has had several hernia repairs.  Reports some itching of the left external ear but otherwise no new complaints.  Nonspecific abdominal discomfort and requesting urinalysis.  Showed trace blood but overall unremarkable.  Symptoms most likely related to previous hernia cares as well as irritable bowel syndrome and hyperplastic polyp removed in 2017 and it soon will be monitored by gastroenterologist.      Review of Systems   HENT: Positive for dental problem, sinus pressure and trouble swallowing.    Cardiovascular: Positive for palpitations.   Endocrine: Positive for heat intolerance.   Genitourinary: Positive for enuresis.   Musculoskeletal: Positive for arthralgias.   Neurological: Positive for headaches.   All other systems reviewed and are negative.        Past Medical History:   Diagnosis Date   • Arthritis    • Bursitis     RIGHT HIP   • Colon polyp 10/31/2017    Ascending colon: hyperplastic polyp, rectum: hyperplastic polyp   • Diverticulitis    • Dysphagia    • Fever blister     OCCASIONALLY   • Hashimoto's thyroiditis    • Hypothyroidism    • Migraine    • Sleep apnea     Compliant w/ Bi-Pap       Past Surgical History:   Procedure Laterality Date   • COLONOSCOPY N/A 2005   • COLONOSCOPY N/A 2010    pt reports no polyps, [address]   • COLONOSCOPY N/A 10/31/2017    Procedure: COLONOSCOPY to Cecum;  Surgeon: Rene Patrick MD;  Location: [facility] ENDOSCOPY;  Service:    • COLONOSCOPY N/A 8/6/2020    Procedure: COLONOSCOPY TO CECUM WITH COLD SNARE POLYPECTOMY;  Surgeon: Minh Del Rosario MD;  Location: [facility] ENDOSCOPY;  Service: Gastroenterology;  Laterality: N/A;  PRE: ABDOMINAL PAIN  POST:POLYP, DIVERTICULOSIS   •  DILATATION AND CURETTAGE  2015    Dr. Gan-benign endometrium w/ changes consistent w/ endometrial polyp   • DILATATION AND CURETTAGE  2001    LRK--benign   • ENDOSCOPY N/A 2021    Procedure: ESOPHAGOGASTRODUODENOSCOPY WITH COLD BIOPSIES;  Surgeon: Minh Del Rosario MD;  Location: [facility] ENDOSCOPY;  Service: Gastroenterology;  Laterality: N/A;  PRE- DYSPHAGIA  POST- ESOPHAGITIS, GASTRITIS, HIATAL HERNIA   • HAND SURGERY      trigger finger   • HERNIA REPAIR N/A 2012    Open repair of incarcerated epigastric ventral hernia w/ Ventralex mesh; Dr. Bandar Parks   • TEMPORAL ARTERY BIOPSY Left 2011    Dr. Ines Russell   • VENTRAL HERNIA REPAIR N/A 2017    Procedure: VENTRAL HERNIA REPAIR LAPAROSCOPIC WITH DAVINCI ROBOT ROBOTIC ASSISTED LAPAROSCOPIC RECURRENT INCISIONAL HERNIA REPAIR x2 WITH MESH, WITH REMOVAL OF OLD MESH ;  Surgeon: Rene Patrick MD;  Location: [facility] MAIN OR;  Service:    • WRIST SURGERY         Family History   Problem Relation Age of Onset   • Hypertension Mother    • Diabetes Mother    • Heart disease Father    • Hypertension Brother    • Hypertension Brother    • Diabetes Brother    • Malig Hyperthermia Neg Hx        Social History     Socioeconomic History   • Marital status:    Tobacco Use   • Smoking status: Former Smoker     Packs/day: 0.50     Years: 5.00     Pack years: 2.50     Types: Cigarettes     Quit date:      Years since quittin.8   • Smokeless tobacco: Never Used   Vaping Use   • Vaping Use: Never used   Substance and Sexual Activity   • Alcohol use: Yes     Comment: SOCIAL   • Drug use: No   • Sexual activity: Defer       Vitals:    10/29/21 1001   BP: 130/90   Pulse: 76   Resp: 18   Temp: 97.1 °F (36.2 °C)   SpO2: 97%        Body mass index is 30.85 kg/m².      Physical Exam  Vitals and nursing note reviewed.   Constitutional:       General: She is not in acute distress.     Appearance: She is well-developed.   HENT:       Head: Normocephalic and atraumatic.      Right Ear: External ear normal.      Left Ear: External ear normal.      Ears:      Comments: Right ear canal occluded with cerumen  Eyes:      Conjunctiva/sclera: Conjunctivae normal.      Pupils: Pupils are equal, round, and reactive to light.   Neck:      Thyroid: No thyromegaly.   Cardiovascular:      Rate and Rhythm: Normal rate and regular rhythm.      Heart sounds: Normal heart sounds.   Pulmonary:      Effort: Pulmonary effort is normal. No respiratory distress.      Breath sounds: Normal breath sounds.   Abdominal:      General: There is no distension.      Palpations: Abdomen is soft. There is no mass.      Tenderness: There is no abdominal tenderness.      Hernia: No hernia is present.   Musculoskeletal:         General: No tenderness or deformity. Normal range of motion.      Cervical back: Normal range of motion.   Lymphadenopathy:      Cervical: No cervical adenopathy.   Skin:     General: Skin is warm and dry.      Coloration: Skin is not pale.      Findings: No rash.   Neurological:      Mental Status: She is alert and oriented to person, place, and time.      Motor: No abnormal muscle tone.      Coordination: Coordination normal.   Psychiatric:         Behavior: Behavior normal.         Thought Content: Thought content normal.         Judgment: Judgment normal.           Assessment/Plan    Diagnoses and all orders for this visit:    1. Dysuria (Primary)  -     POC Urinalysis Dipstick, Automated    2. Essential hypertension    3. Prediabetes    4. Hypothyroidism due to Hashimoto's thyroiditis    5. Menopause syndrome    6. Gastroesophageal reflux disease without esophagitis    7. Obesity (BMI 30-39.9)        Patient here to establish a new primary care physician.  Several health issues noted and addressed.  Followed by several specialist as noted above in the health care team.  This includes gastroenterologist endocrinologist and gynecologist.  Complains  of some itching of left external ear which has not responded to cortisone creams etc.  Exam is unremarkable.  Overall stable on current regimen which will be continued.  Recommend return for Medicare wellness evaluation in May.

## 2021-11-04 ENCOUNTER — PATIENT ROUNDING (BHMG ONLY) (OUTPATIENT)
Dept: FAMILY MEDICINE CLINIC | Facility: CLINIC | Age: 66
End: 2021-11-04

## 2021-11-04 NOTE — PROGRESS NOTES
November 4, 2021    Hello, may I speak with Carly De?    My name is    I am  with [facility]  [address] [place] [id number] [phone].    Before we get started may I verify your date of birth? 1955    I am calling to officially welcome you to our practice and ask about your recent visit. Is this a good time to talk? My Chart message sent    Tell me about your visit with us. What things went well?         We're always looking for ways to make our patients' experiences even better. Do you have recommendations on ways we may improve?      Overall were you satisfied with your first visit to our practice?        I appreciate you taking the time to speak with me today. Is there anything else I can do for you?       Thank you, and have a great day.

## 2021-11-22 ENCOUNTER — OFFICE VISIT (OUTPATIENT)
Dept: GASTROENTEROLOGY | Facility: CLINIC | Age: 66
End: 2021-11-22

## 2021-11-22 VITALS
WEIGHT: 196.8 LBS | SYSTOLIC BLOOD PRESSURE: 118 MMHG | HEART RATE: 88 BPM | HEIGHT: 67 IN | DIASTOLIC BLOOD PRESSURE: 80 MMHG | TEMPERATURE: 96.6 F | BODY MASS INDEX: 30.89 KG/M2 | OXYGEN SATURATION: 99 %

## 2021-11-22 DIAGNOSIS — K21.00 GASTROESOPHAGEAL REFLUX DISEASE WITH ESOPHAGITIS WITHOUT HEMORRHAGE: ICD-10-CM

## 2021-11-22 DIAGNOSIS — K57.92 DIVERTICULITIS: ICD-10-CM

## 2021-11-22 DIAGNOSIS — B37.81 CANDIDAL ESOPHAGITIS (HCC): Primary | ICD-10-CM

## 2021-11-22 PROCEDURE — 99213 OFFICE O/P EST LOW 20 MIN: CPT | Performed by: INTERNAL MEDICINE

## 2021-11-22 RX ORDER — DICYCLOMINE HCL 20 MG
TABLET ORAL
Qty: 90 TABLET | Refills: 2 | Status: SHIPPED | OUTPATIENT
Start: 2021-11-22 | End: 2022-12-01

## 2021-11-22 RX ORDER — VALACYCLOVIR HYDROCHLORIDE 1 G/1
TABLET, FILM COATED ORAL
Qty: 30 TABLET | Refills: 0 | OUTPATIENT
Start: 2021-11-22

## 2021-11-22 NOTE — PROGRESS NOTES
Chief Complaint   Patient presents with   • follow up from scope     Subjective     HPI  Carly De is a 66 y.o. female who presents today for office follow up.     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 Dysphagia has now resolved.  GERD controlled on dailly Omeprazole.      She has a history of diverticulitis as well as some associated IBS-like symptoms with lower abdominal cramping.    She is up-to-date on colonoscopy with colonoscopy performed last year showing left-sided diverticulosis with no active diverticulitis and no polyps.    Objective   Vitals:    11/22/21 1427   BP: 118/80   Pulse: 88   Temp: 96.6 °F (35.9 °C)   SpO2: 99%       Physical Exam  Vitals reviewed.   Constitutional:       Appearance: She is well-developed.   HENT:      Head: Normocephalic and atraumatic.   Neurological:      Mental Status: She is alert and oriented to person, place, and time.   Psychiatric:         Behavior: Behavior normal.         Thought Content: Thought content normal.         Judgment: Judgment normal.           Data reviewed: GI studies EGD from 9/2021 and path Colonoscopy from 2020    Assessment/Plan   Assessment:     1. Candidal esophagitis (HCC)    2. Gastroesophageal reflux disease with esophagitis without hemorrhage    3. Diverticulitis      Plan:   Dysphagia has resolved with fluconazole  Continue PPI for GERD  Continue high fiber/low red meat diet for hx of diverticulitis  Office f/u PRN            Minh Del Rosario M.D.  [facility]  [address] [place] [address]  Office: [phone]

## 2022-01-25 RX ORDER — LEVOTHYROXINE SODIUM 88 UG/1
TABLET ORAL
Qty: 90 TABLET | Refills: 1 | Status: SHIPPED | OUTPATIENT
Start: 2022-01-25 | End: 2022-03-07

## 2022-02-18 ENCOUNTER — APPOINTMENT (OUTPATIENT)
Dept: VACCINE CLINIC | Facility: HOSPITAL | Age: 67
End: 2022-02-18

## 2022-02-22 LAB
25(OH)D3+25(OH)D2 SERPL-MCNC: 43 NG/ML (ref 30–100)
ALBUMIN SERPL-MCNC: 4.2 G/DL (ref 3.8–4.8)
ALBUMIN/GLOB SERPL: 1.4 {RATIO} (ref 1.2–2.2)
ALP SERPL-CCNC: 87 IU/L (ref 44–121)
ALT SERPL-CCNC: 13 IU/L (ref 0–32)
AST SERPL-CCNC: 24 IU/L (ref 0–40)
BILIRUB SERPL-MCNC: 0.7 MG/DL (ref 0–1.2)
BUN SERPL-MCNC: 16 MG/DL (ref 8–27)
BUN/CREAT SERPL: 18 (ref 12–28)
CALCIUM SERPL-MCNC: 9.8 MG/DL (ref 8.7–10.3)
CHLORIDE SERPL-SCNC: 105 MMOL/L (ref 96–106)
CHOLEST SERPL-MCNC: 165 MG/DL (ref 100–199)
CO2 SERPL-SCNC: 23 MMOL/L (ref 20–29)
CREAT SERPL-MCNC: 0.87 MG/DL (ref 0.57–1)
GLOBULIN SER CALC-MCNC: 2.9 G/DL (ref 1.5–4.5)
GLUCOSE SERPL-MCNC: 94 MG/DL (ref 65–99)
HBA1C MFR BLD: 6.1 % (ref 4.8–5.6)
HDLC SERPL-MCNC: 54 MG/DL
IMP & REVIEW OF LAB RESULTS: NORMAL
LDLC SERPL CALC-MCNC: 99 MG/DL (ref 0–99)
POTASSIUM SERPL-SCNC: 4.2 MMOL/L (ref 3.5–5.2)
PROT SERPL-MCNC: 7.1 G/DL (ref 6–8.5)
SODIUM SERPL-SCNC: 141 MMOL/L (ref 134–144)
T4 FREE SERPL-MCNC: 1.27 NG/DL (ref 0.82–1.77)
TRIGL SERPL-MCNC: 59 MG/DL (ref 0–149)
TSH SERPL DL<=0.005 MIU/L-ACNC: 0.28 UIU/ML (ref 0.45–4.5)
VLDLC SERPL CALC-MCNC: 12 MG/DL (ref 5–40)

## 2022-03-02 RX ORDER — VALACYCLOVIR HYDROCHLORIDE 1 G/1
TABLET, FILM COATED ORAL
Qty: 30 TABLET | Refills: 0 | OUTPATIENT
Start: 2022-03-02

## 2022-03-02 RX ORDER — OMEPRAZOLE 40 MG/1
CAPSULE, DELAYED RELEASE ORAL
Qty: 30 CAPSULE | Refills: 1 | OUTPATIENT
Start: 2022-03-02

## 2022-03-04 NOTE — PROGRESS NOTES
Subjective   Carly De is a 67 y.o. female.     F/u for hypothyroidism, vitamin d def, elevated fasting glucose, sleep apnea           Patient is a 67-year-old female who came in for follow-up.       She has primary hypothyroidism and is on levothyroxine 88 mcg/day.  She has no history of goiter or head/neck radiation therapy.  TSH done in February 2022 is 0.278 with a free T4 of 1.27 ng per DL.     She has vitamin D deficiency and is on vitamin D 2000 units 7 tablets on Sundays.  25 hydroxyvitamin D in February 2021 is normal at 43.0 ng/mL.     She has prediabetes.  She has history of gestational diabetes mellitus with one pregnancy.  Her mother and a brother have diabetes mellitus.  Her fasting glucose in December 2020 is elevated at 116 mg per DL.  She has nocturia 1-2 times a night.  She denies polyuria or polydipsia.  She has  lost 6 pounds since July 2021.    Hemoglobin A1c done in February 2022 is mildly elevated at 6.1% with a normal fasting glucose at 94 mg per DL.    She went into her natural menopause at age 55.  She is on Estrace 1 mg/day for 12 weeks and switches to Provera for 1 week and then restart on Estrace after her period stops.   She had a normal mammogram in 2021.  She had a normal bone density done at [facility] in July 2021.   She follows with her gynecologist.     She has sleep apnea and not using CPAP regularly.  She wakes up rested most of the time.  She has not seen a sleep specialist for a while.     She has no history of hypertension.  She has been on hydrochlorothiazide 25 mg once a day as needed for pedal edema.     Her last colonoscopy was in August 2020 and a hyperplastic polyp was removed.  She denies bowel complaints.  She follows with Dr. Del Rosario.    She had 3 Pfizer COVID vaccines without major side effects.     The following portions of the patient's history were reviewed and updated as appropriate: allergies, current medications, past family history, past medical history,  "past social history, past surgical history and problem list.    Review of Systems   Eyes: Negative for visual disturbance.   Respiratory: Negative for shortness of breath and wheezing.    Cardiovascular: Negative.    Gastrointestinal: Negative.    Genitourinary: Negative.    Musculoskeletal: Negative for myalgias.   Neurological: Negative for numbness.     Objective      Vitals:    03/07/22 1000   BP: 118/78   Pulse: 98   SpO2: 98%   Weight: 88.4 kg (194 lb 12.8 oz)   Height: 170.2 cm (67.01\")     Physical Exam  Constitutional:       General: She is not in acute distress.     Appearance: Normal appearance. She is normal weight. She is not ill-appearing or diaphoretic.   Eyes:      General: No scleral icterus.        Right eye: No discharge.         Left eye: No discharge.      Extraocular Movements: Extraocular movements intact.      Conjunctiva/sclera: Conjunctivae normal.   Cardiovascular:      Rate and Rhythm: Normal rate and regular rhythm.      Heart sounds: Normal heart sounds. No murmur heard.    No friction rub. No gallop.   Pulmonary:      Effort: No respiratory distress.      Breath sounds: Normal breath sounds. No stridor. No rales.   Chest:      Chest wall: No tenderness.   Abdominal:      General: Bowel sounds are normal. There is no distension.      Palpations: Abdomen is soft. There is no mass.      Tenderness: There is no right CVA tenderness or left CVA tenderness.   Musculoskeletal:         General: No swelling or tenderness. Normal range of motion.   Skin:     General: Skin is warm.   Neurological:      Mental Status: She is alert and oriented to person, place, and time. Mental status is at baseline.   Psychiatric:         Mood and Affect: Mood normal.         Behavior: Behavior normal.       Office Visit on 10/29/2021   Component Date Value Ref Range Status   • Color 10/29/2021 Yellow  Yellow, Straw, Dark Yellow, Tavia Final   • Clarity, UA 10/29/2021 Clear  Clear Final   • Specific Gravity  " 10/29/2021 1.025  1.005 - 1.030 Final   • pH, Urine 10/29/2021 6.0  5.0 - 8.0 Final   • Leukocytes 10/29/2021 Negative  Negative Final   • Nitrite, UA 10/29/2021 Negative  Negative Final   • Protein, POC 10/29/2021 Negative  Negative mg/dL Final   • Glucose, UA 10/29/2021 Negative  Negative, 1000 mg/dL (3+) mg/dL Final   • Ketones, UA 10/29/2021 Negative  Negative Final   • Urobilinogen, UA 10/29/2021 Normal  Normal Final   • Bilirubin 10/29/2021 Negative  Negative Final   • Blood, UA 10/29/2021 Trace (A) Negative Final   • Lot Number 10/29/2021 [id number]   Final   • Expiration Date 10/29/2021 10/31/2021   Final     Assessment/Plan   Diagnoses and all orders for this visit:    1. Acquired hypothyroidism (Primary)    2. Sleep apnea, unspecified type    3. Vitamin D deficiency    4. Elevated fasting glucose    5. Prediabetes    Other orders  -     levothyroxine (SYNTHROID, LEVOTHROID) 75 MCG tablet; 1 tablet daily  Dispense: 90 tablet; Refill: 1      Decrease levothyroxine to 75 mcg/day.  Advised to follow-up with sleep specialist.  Continue vitamin D3 weekly.  Continue no concentrated sweet diet.    Copy of my note sent to Dr. Garduno.    RTC 4 mos.

## 2022-03-06 RX ORDER — VALACYCLOVIR HYDROCHLORIDE 1 G/1
TABLET, FILM COATED ORAL
Qty: 30 TABLET | Refills: 0 | Status: SHIPPED | OUTPATIENT
Start: 2022-03-06 | End: 2022-03-07 | Stop reason: SDUPTHER

## 2022-03-07 ENCOUNTER — OFFICE VISIT (OUTPATIENT)
Dept: ENDOCRINOLOGY | Age: 67
End: 2022-03-07

## 2022-03-07 VITALS
BODY MASS INDEX: 30.57 KG/M2 | HEIGHT: 67 IN | HEART RATE: 98 BPM | SYSTOLIC BLOOD PRESSURE: 118 MMHG | DIASTOLIC BLOOD PRESSURE: 78 MMHG | OXYGEN SATURATION: 98 % | WEIGHT: 194.8 LBS

## 2022-03-07 DIAGNOSIS — R73.03 PREDIABETES: ICD-10-CM

## 2022-03-07 DIAGNOSIS — E55.9 VITAMIN D DEFICIENCY: ICD-10-CM

## 2022-03-07 DIAGNOSIS — G47.30 SLEEP APNEA, UNSPECIFIED TYPE: ICD-10-CM

## 2022-03-07 DIAGNOSIS — R73.01 ELEVATED FASTING GLUCOSE: ICD-10-CM

## 2022-03-07 DIAGNOSIS — E03.9 ACQUIRED HYPOTHYROIDISM: Primary | ICD-10-CM

## 2022-03-07 PROCEDURE — 99214 OFFICE O/P EST MOD 30 MIN: CPT | Performed by: INTERNAL MEDICINE

## 2022-03-07 RX ORDER — VALACYCLOVIR HYDROCHLORIDE 1 G/1
1000 TABLET, FILM COATED ORAL 2 TIMES DAILY
Qty: 30 TABLET | Refills: 2 | Status: SHIPPED | OUTPATIENT
Start: 2022-03-07

## 2022-03-07 RX ORDER — LEVOTHYROXINE SODIUM 0.07 MG/1
TABLET ORAL
Qty: 90 TABLET | Refills: 1 | Status: SHIPPED | OUTPATIENT
Start: 2022-03-07 | End: 2022-08-19 | Stop reason: SDUPTHER

## 2022-03-07 NOTE — TELEPHONE ENCOUNTER
Rx Refill Note  Requested Prescriptions      No prescriptions requested or ordered in this encounter      Last office visit with prescribing clinician: 10/29/2021      Next office visit with prescribing clinician: Visit date not found            Luisa Hernandez MA  03/07/22, 10:14 EST

## 2022-03-24 RX ORDER — OMEPRAZOLE 40 MG/1
CAPSULE, DELAYED RELEASE ORAL
Qty: 30 CAPSULE | Refills: 1 | Status: SHIPPED | OUTPATIENT
Start: 2022-03-24 | End: 2022-11-23

## 2022-04-19 ENCOUNTER — HOSPITAL ENCOUNTER (OUTPATIENT)
Dept: GENERAL RADIOLOGY | Facility: HOSPITAL | Age: 67
Discharge: HOME | End: 2022-04-19
Admitting: FAMILY MEDICINE

## 2022-04-19 ENCOUNTER — OFFICE VISIT (OUTPATIENT)
Dept: FAMILY MEDICINE CLINIC | Facility: CLINIC | Age: 67
End: 2022-04-19

## 2022-04-19 VITALS
OXYGEN SATURATION: 98 % | SYSTOLIC BLOOD PRESSURE: 140 MMHG | WEIGHT: 198.2 LBS | BODY MASS INDEX: 31.11 KG/M2 | HEART RATE: 82 BPM | DIASTOLIC BLOOD PRESSURE: 98 MMHG | HEIGHT: 67 IN | RESPIRATION RATE: 20 BRPM | TEMPERATURE: 96.9 F

## 2022-04-19 DIAGNOSIS — M54.50 ACUTE LEFT-SIDED LOW BACK PAIN WITHOUT SCIATICA: Primary | ICD-10-CM

## 2022-04-19 DIAGNOSIS — M54.50 ACUTE LEFT-SIDED LOW BACK PAIN WITHOUT SCIATICA: ICD-10-CM

## 2022-04-19 PROCEDURE — 72110 X-RAY EXAM L-2 SPINE 4/>VWS: CPT

## 2022-04-19 PROCEDURE — 99213 OFFICE O/P EST LOW 20 MIN: CPT | Performed by: FAMILY MEDICINE

## 2022-04-19 RX ORDER — MEDROXYPROGESTERONE ACETATE 2.5 MG/1
TABLET ORAL
COMMUNITY
Start: 2022-03-23

## 2022-04-19 RX ORDER — TIZANIDINE 2 MG/1
2 TABLET ORAL EVERY 6 HOURS PRN
Qty: 30 TABLET | Refills: 1 | Status: SHIPPED | OUTPATIENT
Start: 2022-04-19 | End: 2022-09-08

## 2022-04-19 RX ORDER — DICLOFENAC SODIUM 75 MG/1
75 TABLET, DELAYED RELEASE ORAL 2 TIMES DAILY
Qty: 30 TABLET | Refills: 0 | Status: SHIPPED | OUTPATIENT
Start: 2022-04-19 | End: 2022-09-08

## 2022-04-19 NOTE — PROGRESS NOTES
HPI  Carly De is a 67 y.o. female who is here for low back pain that started on the right but now is on the left low back.  Apparently had back injury many years ago at work.  Denies any acute injury but just leaned over etc.  Has been taking over-the-counter NSAIDs without relief.  Did not tolerate Flexeril in the past because of drowsiness etc.  All of these issues were discussed.      Review of Systems   Constitutional: Negative for chills, diaphoresis and fever.   Musculoskeletal: Positive for back pain.   Neurological: Negative for weakness and numbness.   All other systems reviewed and are negative.        Past Medical History:   Diagnosis Date   • Arthritis    • Bursitis     RIGHT HIP   • Colon polyp 10/31/2017    Ascending colon: hyperplastic polyp, rectum: hyperplastic polyp   • Diverticulitis    • Dysphagia    • Fever blister     OCCASIONALLY   • Hashimoto's thyroiditis    • Hypothyroidism    • Migraine    • Sleep apnea     Compliant w/ Bi-Pap       Past Surgical History:   Procedure Laterality Date   • COLONOSCOPY N/A 2005   • COLONOSCOPY N/A 2010    pt reports no polyps, [address]   • COLONOSCOPY N/A 10/31/2017    Procedure: COLONOSCOPY to Cecum;  Surgeon: Rene Patrick MD;  Location: [facility] ENDOSCOPY;  Service:    • COLONOSCOPY N/A 8/6/2020    Procedure: COLONOSCOPY TO CECUM WITH COLD SNARE POLYPECTOMY;  Surgeon: Minh Del Rosario MD;  Location: [facility] ENDOSCOPY;  Service: Gastroenterology;  Laterality: N/A;  PRE: ABDOMINAL PAIN  POST:POLYP, DIVERTICULOSIS   • DILATATION AND CURETTAGE  01/28/2015    Dr. Gan-benign endometrium w/ changes consistent w/ endometrial polyp   • DILATATION AND CURETTAGE  09/13/2001    LRK--benign   • ENDOSCOPY N/A 9/8/2021    Procedure: ESOPHAGOGASTRODUODENOSCOPY WITH COLD BIOPSIES;  Surgeon: Minh Del Rosario MD;  Location: [facility] ENDOSCOPY;  Service: Gastroenterology;  Laterality: N/A;  PRE- DYSPHAGIA  POST- ESOPHAGITIS, GASTRITIS,  HIATAL HERNIA   • HAND SURGERY      trigger finger   • HERNIA REPAIR N/A 2012    Open repair of incarcerated epigastric ventral hernia w/ Ventralex mesh; Dr. Bandar Parks   • TEMPORAL ARTERY BIOPSY Left 2011    Dr. Ines Russell   • VENTRAL HERNIA REPAIR N/A 2017    Procedure: VENTRAL HERNIA REPAIR LAPAROSCOPIC WITH DAVINCI ROBOT ROBOTIC ASSISTED LAPAROSCOPIC RECURRENT INCISIONAL HERNIA REPAIR x2 WITH MESH, WITH REMOVAL OF OLD MESH ;  Surgeon: Rene Patrick MD;  Location: [facility] OR;  Service:    • WRIST SURGERY         Family History   Problem Relation Age of Onset   • Hypertension Mother    • Diabetes Mother    • Heart disease Father    • Hypertension Brother    • Hypertension Brother    • Diabetes Brother    • Malig Hyperthermia Neg Hx        Social History     Socioeconomic History   • Marital status:    Tobacco Use   • Smoking status: Former Smoker     Packs/day: 0.50     Years: 5.00     Pack years: 2.50     Types: Cigarettes     Quit date:      Years since quittin.3   • Smokeless tobacco: Never Used   Vaping Use   • Vaping Use: Never used   Substance and Sexual Activity   • Alcohol use: Yes     Comment: SOCIAL   • Drug use: No   • Sexual activity: Defer       Vitals:    22 1428   BP: 140/98   Pulse: 82   Resp: 20   Temp: 96.9 °F (36.1 °C)   SpO2: 98%        Body mass index is 31.03 kg/m².      Physical Exam  Vitals and nursing note reviewed.   Constitutional:       General: She is not in acute distress.     Appearance: She is well-developed.   HENT:      Head: Normocephalic and atraumatic.      Nose:      Comments: Patient with mask.  Provider with mask and shield  Eyes:      Conjunctiva/sclera: Conjunctivae normal.      Pupils: Pupils are equal, round, and reactive to light.   Neck:      Thyroid: No thyromegaly.   Cardiovascular:      Rate and Rhythm: Normal rate and regular rhythm.      Heart sounds: Normal heart sounds.   Pulmonary:      Effort: Pulmonary  effort is normal. No respiratory distress.      Breath sounds: Normal breath sounds.   Abdominal:      General: There is no distension.      Palpations: Abdomen is soft. There is no mass.      Tenderness: There is no abdominal tenderness.      Hernia: No hernia is present.   Musculoskeletal:         General: No tenderness or deformity. Normal range of motion.      Cervical back: Normal range of motion.   Lymphadenopathy:      Cervical: No cervical adenopathy.   Skin:     General: Skin is warm and dry.      Coloration: Skin is not pale.      Findings: No rash.   Neurological:      General: No focal deficit present.      Mental Status: She is alert and oriented to person, place, and time.      Motor: No abnormal muscle tone.      Coordination: Coordination normal.   Psychiatric:         Mood and Affect: Mood normal.         Behavior: Behavior normal.         Thought Content: Thought content normal.         Judgment: Judgment normal.           Assessment/Plan    Diagnoses and all orders for this visit:    1. Acute left-sided low back pain without sciatica (Primary)  -     XR Spine Lumbar 4+ View; Future  -     diclofenac (VOLTAREN) 75 MG EC tablet; Take 1 tablet by mouth 2 (Two) Times a Day.  Dispense: 30 tablet; Refill: 0  -     tiZANidine (ZANAFLEX) 2 MG tablet; Take 1 tablet by mouth Every 6 (Six) Hours As Needed for Muscle Spasms.  Dispense: 30 tablet; Refill: 1        Patient presents with 2-week history of low back pain initially on the right but now on the left.  No renal symptoms.  No radiation and no weakness.  No bladder nor bowel control issues etc.  Apparently does have past history of back issues related to work injury?  Discussed treatment options and will start with NSAID and muscle relaxer.  Did not tolerate Flexeril in the past.  Will send for x-rays as discussed and further evaluate and treat depending on response to initial therapy as well as x-ray reports.

## 2022-08-03 ENCOUNTER — TELEPHONE (OUTPATIENT)
Dept: ENDOCRINOLOGY | Age: 67
End: 2022-08-03

## 2022-08-04 DIAGNOSIS — R73.03 PREDIABETES: ICD-10-CM

## 2022-08-04 DIAGNOSIS — E55.9 VITAMIN D DEFICIENCY: ICD-10-CM

## 2022-08-04 DIAGNOSIS — E03.9 ACQUIRED HYPOTHYROIDISM: Primary | ICD-10-CM

## 2022-08-06 LAB
25(OH)D3+25(OH)D2 SERPL-MCNC: 43.8 NG/ML (ref 30–100)
ALBUMIN SERPL-MCNC: 4.3 G/DL (ref 3.8–4.8)
ALBUMIN/GLOB SERPL: 1.6 {RATIO} (ref 1.2–2.2)
ALP SERPL-CCNC: 79 IU/L (ref 44–121)
ALT SERPL-CCNC: 11 IU/L (ref 0–32)
AST SERPL-CCNC: 20 IU/L (ref 0–40)
BILIRUB SERPL-MCNC: 0.5 MG/DL (ref 0–1.2)
BUN SERPL-MCNC: 17 MG/DL (ref 8–27)
BUN/CREAT SERPL: 22 (ref 12–28)
CALCIUM SERPL-MCNC: 9.6 MG/DL (ref 8.7–10.3)
CHLORIDE SERPL-SCNC: 102 MMOL/L (ref 96–106)
CO2 SERPL-SCNC: 22 MMOL/L (ref 20–29)
CREAT SERPL-MCNC: 0.76 MG/DL (ref 0.57–1)
EGFRCR SERPLBLD CKD-EPI 2021: 86 ML/MIN/1.73
GLOBULIN SER CALC-MCNC: 2.7 G/DL (ref 1.5–4.5)
GLUCOSE SERPL-MCNC: 95 MG/DL (ref 65–99)
HBA1C MFR BLD: 6.1 % (ref 4.8–5.6)
POTASSIUM SERPL-SCNC: 4.3 MMOL/L (ref 3.5–5.2)
PROT SERPL-MCNC: 7 G/DL (ref 6–8.5)
SODIUM SERPL-SCNC: 140 MMOL/L (ref 134–144)
T4 FREE SERPL-MCNC: 1.05 NG/DL (ref 0.82–1.77)
TSH SERPL DL<=0.005 MIU/L-ACNC: 0.72 UIU/ML (ref 0.45–4.5)

## 2022-08-18 NOTE — PROGRESS NOTES
Subjective   Carly De is a 67 y.o. female.     F/u for hypothyroidism, vitamin d def, elevated fasting glucose, sleep apnea              Patient is a 67-year-old female who came in for follow-up.       She has primary hypothyroidism and is on levothyroxine 88 mcg/day.  She has no history of goiter or head/neck radiation therapy.  TSH done in  August 2022 is normal at 0.716 with normal free T4 at 1.05 ng per DL.     She has vitamin D deficiency and is on vitamin D 2000 units 7 tablets on Sundays.  25 hydroxyvitamin D in August 2022 is normal at 43.8 ng/mL.     She has prediabetes.  She has history of gestational diabetes mellitus with one pregnancy.  Her mother and a brother have diabetes mellitus.  Her fasting glucose in December 2020 is elevated at 116 mg per DL.  She has nocturia 1-2 times a night.  She denies polyuria or polydipsia.  She has lost 5 pounds since April 2022.      Hemoglobin A1c done in August 2022 is 6.1% with a normal fasting glucose at 95 mg per DL.     She went into her natural menopause at age 55.  She is on Estrace 1 mg/day for 12 weeks and switches to Provera for 1 week and then restart on Estrace after her period stops.   She had a normal mammogram in 2021.  She had a normal bone density done at [facility] in July 2021.   She follows with her gynecologist Dr. Perez.     She has sleep apnea and is not using CPAP regularly.  She wakes up rested most of the time.  She occasionally wakes herself up snoring.  She has not seen a sleep specialist for a while.     She has no history of hypertension.  She has been on hydrochlorothiazide 25 mg once a day as needed for pedal edema.    She is retired  at [employer].     The following portions of the patient's history were reviewed and updated as appropriate: allergies, current medications, past family history, past medical history, past social history, past surgical history and problem list.    Review of Systems   Eyes: Negative for visual  "disturbance.   Respiratory: Negative for shortness of breath.    Cardiovascular: Negative for chest pain and palpitations.   Gastrointestinal: Negative.    Genitourinary: Negative.    Musculoskeletal: Negative for myalgias.   Neurological: Negative for numbness.     Vitals:    08/19/22 1016   BP: 130/70   Pulse: 77   Temp: 97.7 °F (36.5 °C)   TempSrc: Temporal   SpO2: 98%   Weight: 85.9 kg (189 lb 6.4 oz)   Height: 170.2 cm (67\")      Objective   Physical Exam  Constitutional:       General: She is not in acute distress.     Appearance: Normal appearance. She is obese. She is not ill-appearing or toxic-appearing.   Eyes:      General: No scleral icterus.        Right eye: No discharge.         Left eye: No discharge.      Extraocular Movements: Extraocular movements intact.      Conjunctiva/sclera: Conjunctivae normal.   Neck:      Vascular: No carotid bruit.      Comments: Thyroid barely palpable.  Cardiovascular:      Rate and Rhythm: Normal rate and regular rhythm.      Pulses: Normal pulses.      Heart sounds: Normal heart sounds.   Pulmonary:      Effort: Pulmonary effort is normal. No respiratory distress.      Breath sounds: Normal breath sounds. No stridor. No rales.   Chest:      Chest wall: No tenderness.   Abdominal:      General: Bowel sounds are normal. There is no distension.      Palpations: Abdomen is soft. There is no mass.      Tenderness: There is no right CVA tenderness or left CVA tenderness.   Musculoskeletal:         General: No swelling or tenderness. Normal range of motion.      Cervical back: Normal range of motion. No rigidity.      Right lower leg: No edema.      Left lower leg: No edema.   Lymphadenopathy:      Cervical: No cervical adenopathy.   Skin:     General: Skin is dry.      Coloration: Skin is not jaundiced.   Neurological:      General: No focal deficit present.      Mental Status: She is alert and oriented to person, place, and time.   Psychiatric:         Mood and Affect: " Mood normal.         Behavior: Behavior normal.       Orders Only on 08/04/2022   Component Date Value Ref Range Status   • TSH 08/05/2022 0.716  0.450 - 4.500 uIU/mL Final   • Free T4 08/05/2022 1.05  0.82 - 1.77 ng/dL Final   • 25 Hydroxy, Vitamin D 08/05/2022 43.8  30.0 - 100.0 ng/mL Final    Comment: Vitamin D deficiency has been defined by the [place] of  Medicine and an Endocrine Society practice guideline as a  level of serum 25-OH vitamin D less than 20 ng/mL (1,2).  The Endocrine Society went on to further define vitamin D  insufficiency as a level between 21 and 29 ng/mL (2).  1. IOM ([place] [employer]). 2010. Dietary reference     intakes for calcium and D. [place]: The     National Academies Press.  2. Bev MF, Michael CHARLES, Cici MAJANO, et al.     Evaluation, treatment, and prevention of vitamin D     deficiency: an Endocrine Society clinical practice     guideline. JCEM. 2011 Jul; 96(7):1911-30.     • Glucose 08/05/2022 95  65 - 99 mg/dL Final   • BUN 08/05/2022 17  8 - 27 mg/dL Final   • Creatinine 08/05/2022 0.76  0.57 - 1.00 mg/dL Final   • EGFR Result 08/05/2022 86  >59 mL/min/1.73 Final   • BUN/Creatinine Ratio 08/05/2022 22  12 - 28 Final   • Sodium 08/05/2022 140  134 - 144 mmol/L Final   • Potassium 08/05/2022 4.3  3.5 - 5.2 mmol/L Final   • Chloride 08/05/2022 102  96 - 106 mmol/L Final   • Total CO2 08/05/2022 22  20 - 29 mmol/L Final   • Calcium 08/05/2022 9.6  8.7 - 10.3 mg/dL Final   • Total Protein 08/05/2022 7.0  6.0 - 8.5 g/dL Final   • Albumin 08/05/2022 4.3  3.8 - 4.8 g/dL Final   • Globulin 08/05/2022 2.7  1.5 - 4.5 g/dL Final   • A/G Ratio 08/05/2022 1.6  1.2 - 2.2 Final   • Total Bilirubin 08/05/2022 0.5  0.0 - 1.2 mg/dL Final   • Alkaline Phosphatase 08/05/2022 79  44 - 121 IU/L Final   • AST (SGOT) 08/05/2022 20  0 - 40 IU/L Final   • ALT (SGPT) 08/05/2022 11  0 - 32 IU/L Final   • Hemoglobin A1C 08/05/2022 6.1 (A) 4.8 - 5.6 % Final    Comment:           Prediabetes: 5.7 - 6.4           Diabetes: >6.4           Glycemic control for adults with diabetes: <7.0       Assessment & Plan   Diagnoses and all orders for this visit:    1. Acquired hypothyroidism (Primary)    2. Vitamin D deficiency    3. Elevated fasting glucose    4. Prediabetes    5. Sleep apnea, unspecified type    Other orders  -     levothyroxine (SYNTHROID, LEVOTHROID) 75 MCG tablet; 1 tablet daily  Dispense: 90 tablet; Refill: 2      Continue levothyroxine 75 mcg/day.  Continue vit D supplements.  Continue NCS diet.  Advised to follow-up with sleep specialist.    RTC 6 mos.

## 2022-08-19 ENCOUNTER — OFFICE VISIT (OUTPATIENT)
Dept: ENDOCRINOLOGY | Age: 67
End: 2022-08-19

## 2022-08-19 VITALS
WEIGHT: 189.4 LBS | DIASTOLIC BLOOD PRESSURE: 70 MMHG | HEART RATE: 77 BPM | HEIGHT: 67 IN | TEMPERATURE: 97.7 F | SYSTOLIC BLOOD PRESSURE: 130 MMHG | BODY MASS INDEX: 29.73 KG/M2 | OXYGEN SATURATION: 98 %

## 2022-08-19 DIAGNOSIS — R73.01 ELEVATED FASTING GLUCOSE: ICD-10-CM

## 2022-08-19 DIAGNOSIS — G47.30 SLEEP APNEA, UNSPECIFIED TYPE: ICD-10-CM

## 2022-08-19 DIAGNOSIS — R73.03 PREDIABETES: ICD-10-CM

## 2022-08-19 DIAGNOSIS — E03.9 ACQUIRED HYPOTHYROIDISM: Primary | ICD-10-CM

## 2022-08-19 DIAGNOSIS — E55.9 VITAMIN D DEFICIENCY: ICD-10-CM

## 2022-08-19 PROCEDURE — 99214 OFFICE O/P EST MOD 30 MIN: CPT | Performed by: INTERNAL MEDICINE

## 2022-08-19 RX ORDER — LEVOTHYROXINE SODIUM 0.07 MG/1
TABLET ORAL
Qty: 90 TABLET | Refills: 2 | Status: SHIPPED | OUTPATIENT
Start: 2022-08-19 | End: 2023-01-19 | Stop reason: SDUPTHER

## 2022-09-08 ENCOUNTER — OFFICE VISIT (OUTPATIENT)
Dept: FAMILY MEDICINE CLINIC | Facility: CLINIC | Age: 67
End: 2022-09-08

## 2022-09-08 VITALS
TEMPERATURE: 97.3 F | HEART RATE: 87 BPM | HEIGHT: 67 IN | SYSTOLIC BLOOD PRESSURE: 130 MMHG | RESPIRATION RATE: 18 BRPM | BODY MASS INDEX: 29.82 KG/M2 | OXYGEN SATURATION: 97 % | WEIGHT: 190 LBS | DIASTOLIC BLOOD PRESSURE: 88 MMHG

## 2022-09-08 DIAGNOSIS — M25.532 LEFT WRIST PAIN: ICD-10-CM

## 2022-09-08 DIAGNOSIS — M89.8X1 PAIN OF RIGHT SCAPULA: Primary | ICD-10-CM

## 2022-09-08 PROCEDURE — 99213 OFFICE O/P EST LOW 20 MIN: CPT | Performed by: NURSE PRACTITIONER

## 2022-09-08 RX ORDER — IBUPROFEN 800 MG/1
800 TABLET ORAL EVERY 8 HOURS PRN
Qty: 60 TABLET | Refills: 0 | Status: SHIPPED | OUTPATIENT
Start: 2022-09-08 | End: 2022-11-29

## 2022-09-08 RX ORDER — BACLOFEN 10 MG/1
10 TABLET ORAL DAILY PRN
Qty: 20 TABLET | Refills: 0 | Status: SHIPPED | OUTPATIENT
Start: 2022-09-08 | End: 2022-11-29

## 2022-09-08 NOTE — PATIENT INSTRUCTIONS
Schedule an appointment for Wellness visit and come fasting to appointment. Follow-up sooner as needed. Use of ice therapy and topical pain cream for shoulder pain.

## 2022-09-08 NOTE — PROGRESS NOTES
"Chief Complaint  Shoulder Pain (Right side no injury) and left wrist pain up to elbow    Subjective    {Problem List  Visit Diagnosis   Encounters  Notes  Medications  Labs  Result Review Imaging  Media :23}    Carly De presents to [facility]  History of Present Illness   67-year-old female, former Dr. Garduno patient, new to me, presenting to establish care with complaints of right scapula pain, denies any known injury, states she is a side sleeper, pain is increased upon wakening, has used topical pain cream with moderate relief, no decrease in range of motion of right shoulder/arm.  She also complains of left wrist pain that radiates to elbow, denies any known injury or repetitive movements such as typing, writing or lifting, tenderness noted upon palpation and with ROM.  Agrees to trying ibuprofen and baclofen, if symptoms not resolved will consider imaging and/or possible referral to Ortho.  She plans for Medicare wellness visit in 1 month.     Objective   Vital Signs:  /88 (BP Location: Left arm, Patient Position: Sitting)   Pulse 87   Temp 97.3 °F (36.3 °C) (Infrared)   Resp 18   Ht 170.2 cm (67.01\")   Wt 86.2 kg (190 lb)   SpO2 97%   BMI 29.75 kg/m²   Estimated body mass index is 29.75 kg/m² as calculated from the following:    Height as of this encounter: 170.2 cm (67.01\").    Weight as of this encounter: 86.2 kg (190 lb).          Physical Exam  Cardiovascular:      Rate and Rhythm: Normal rate.      Pulses: Normal pulses.      Heart sounds: Normal heart sounds.   Pulmonary:      Effort: Pulmonary effort is normal.      Breath sounds: Normal breath sounds.   Musculoskeletal:      Right shoulder: Tenderness present.      Left wrist: Tenderness present.      Comments: Tenderness of right scapula   Neurological:      General: No focal deficit present.      Mental Status: She is alert and oriented to person, place, and time.   Psychiatric:         " Mood and Affect: Mood normal.         Behavior: Behavior normal.        Result Review :                Assessment and Plan   Diagnoses and all orders for this visit:    1. Pain of right scapula (Primary)  -     baclofen (LIORESAL) 10 MG tablet; Take 1 tablet by mouth Daily As Needed for Muscle Spasms.  Dispense: 20 tablet; Refill: 0  -     ibuprofen (ADVIL,MOTRIN) 800 MG tablet; Take 1 tablet by mouth Every 8 (Eight) Hours As Needed for Mild Pain.  Dispense: 60 tablet; Refill: 0    2. Left wrist pain  -     ibuprofen (ADVIL,MOTRIN) 800 MG tablet; Take 1 tablet by mouth Every 8 (Eight) Hours As Needed for Mild Pain.  Dispense: 60 tablet; Refill: 0             Follow Up   Return in about 1 month (around 10/8/2022) for Medicare Wellness.  Patient was given instructions and counseling regarding her condition or for health maintenance advice. Please see specific information pulled into the AVS if appropriate.     Schedule an appointment for Wellness visit and come fasting to appointment. Follow-up sooner as needed. Use of ice therapy and topical pain cream for shoulder pain.     Mask and goggles worn

## 2022-10-17 ENCOUNTER — OFFICE VISIT (OUTPATIENT)
Dept: FAMILY MEDICINE CLINIC | Facility: CLINIC | Age: 67
End: 2022-10-17

## 2022-10-17 VITALS
OXYGEN SATURATION: 98 % | HEART RATE: 76 BPM | TEMPERATURE: 98.2 F | DIASTOLIC BLOOD PRESSURE: 90 MMHG | HEIGHT: 67 IN | BODY MASS INDEX: 30.1 KG/M2 | SYSTOLIC BLOOD PRESSURE: 130 MMHG | WEIGHT: 191.8 LBS

## 2022-10-17 DIAGNOSIS — F32.0 CURRENT MILD EPISODE OF MAJOR DEPRESSIVE DISORDER WITHOUT PRIOR EPISODE: ICD-10-CM

## 2022-10-17 DIAGNOSIS — K21.9 GASTROESOPHAGEAL REFLUX DISEASE WITHOUT ESOPHAGITIS: ICD-10-CM

## 2022-10-17 DIAGNOSIS — Z00.00 MEDICARE ANNUAL WELLNESS VISIT, SUBSEQUENT: Primary | ICD-10-CM

## 2022-10-17 DIAGNOSIS — Z13.0 SCREENING, IRON DEFICIENCY ANEMIA: ICD-10-CM

## 2022-10-17 DIAGNOSIS — I10 ESSENTIAL HYPERTENSION: ICD-10-CM

## 2022-10-17 DIAGNOSIS — E03.9 HYPOTHYROIDISM, UNSPECIFIED TYPE: ICD-10-CM

## 2022-10-17 DIAGNOSIS — K58.9 IRRITABLE BOWEL SYNDROME, UNSPECIFIED TYPE: ICD-10-CM

## 2022-10-17 LAB
ERYTHROCYTE [DISTWIDTH] IN BLOOD BY AUTOMATED COUNT: 13.7 % (ref 12.3–15.4)
HCT VFR BLD AUTO: 42.4 % (ref 34–46.6)
HCV AB SER DONR QL: NORMAL
HGB BLD-MCNC: 13.8 G/DL (ref 12–15.9)
LYMPHOCYTES # BLD AUTO: 2.4 10*3/MM3 (ref 0.7–3.1)
LYMPHOCYTES NFR BLD AUTO: 49.7 % (ref 19.6–45.3)
MCH RBC QN AUTO: 26.1 PG (ref 26.6–33)
MCHC RBC AUTO-ENTMCNC: 32.5 G/DL (ref 31.5–35.7)
MCV RBC AUTO: 80.3 FL (ref 79–97)
MONOCYTES # BLD AUTO: 0.3 10*3/MM3 (ref 0.1–0.9)
MONOCYTES NFR BLD AUTO: 5.6 % (ref 5–12)
NEUTROPHILS NFR BLD AUTO: 2.1 10*3/MM3 (ref 1.7–7)
NEUTROPHILS NFR BLD AUTO: 44.7 % (ref 42.7–76)
PLATELET # BLD AUTO: 308 10*3/MM3 (ref 140–450)
PMV BLD AUTO: 7.6 FL (ref 6–12)
RBC # BLD AUTO: 5.28 10*6/MM3 (ref 3.77–5.28)
WBC NRBC COR # BLD: 4.8 10*3/MM3 (ref 3.4–10.8)

## 2022-10-17 PROCEDURE — 85025 COMPLETE CBC W/AUTO DIFF WBC: CPT | Performed by: NURSE PRACTITIONER

## 2022-10-17 PROCEDURE — 36415 COLL VENOUS BLD VENIPUNCTURE: CPT | Performed by: NURSE PRACTITIONER

## 2022-10-17 PROCEDURE — 1170F FXNL STATUS ASSESSED: CPT | Performed by: NURSE PRACTITIONER

## 2022-10-17 PROCEDURE — 1126F AMNT PAIN NOTED NONE PRSNT: CPT | Performed by: NURSE PRACTITIONER

## 2022-10-17 PROCEDURE — 1160F RVW MEDS BY RX/DR IN RCRD: CPT | Performed by: NURSE PRACTITIONER

## 2022-10-17 PROCEDURE — 86803 HEPATITIS C AB TEST: CPT | Performed by: NURSE PRACTITIONER

## 2022-10-17 PROCEDURE — G0439 PPPS, SUBSEQ VISIT: HCPCS | Performed by: NURSE PRACTITIONER

## 2022-10-17 RX ORDER — FLUOXETINE 10 MG/1
10 CAPSULE ORAL DAILY
Qty: 45 CAPSULE | Refills: 0 | Status: SHIPPED | OUTPATIENT
Start: 2022-10-17 | End: 2022-11-14 | Stop reason: SDUPTHER

## 2022-10-17 NOTE — PROGRESS NOTES
The ABCs of the Annual Wellness Visit  Subsequent Medicare Wellness Visit    Chief Complaint   Patient presents with   • Medicare Wellness-subsequent      Subjective    History of Present Illness:  Carly De is a 67 y.o. female who presents for a Subsequent Medicare Wellness Visit.  Last colonoscopy 2020 with 5-year repeat.  UTD on pap and mammo.  Last bone density 2021 with repeat 2023.  She reports intermittent episodes of depression related to multiple deaths in family within the last year, denies SI, agrees to trying fluoxetine 10 mg daily with follow-up in 4 weeks.  She denies need for any medication refills at this time.    With hypothyroidism, takes levothyroxine 75 MCG daily, sees Dr. Gillespie with endocrinology.     With GERD, takes omeprazole 40 mg daily.    With IBS, takes Bentyl 20 mg 4 times a day before meals as needed, sees Dr. Del Rosario with gastroenterology.    The following portions of the patient's history were reviewed and   updated as appropriate: allergies, current medications, past family history, past medical history, past social history, past surgical history and problem list.    Compared to one year ago, the patient feels her physical   health is the same.    Compared to one year ago, the patient feels her mental   health is worse.    Recent Hospitalizations:  She was not admitted to the hospital during the last year.       Current Medical Providers:  Patient Care Team:  Devorah Varma APRN as PCP - General (Nurse Practitioner)  Saul Gillespie MD as Consulting Physician (Endocrinology)  Minh Del Rosario MD as Consulting Physician (Gastroenterology)  Demario Nicolas MD as Consulting Physician (Obstetrics and Gynecology)  Rene Patrick MD as Consulting Physician (General Surgery)    Outpatient Medications Prior to Visit   Medication Sig Dispense Refill   • aspirin-acetaminophen-caffeine (EXCEDRIN MIGRAINE) 250-250-65 MG per tablet Take 1 tablet by mouth  Every 6 (Six) Hours As Needed for Headache.     • baclofen (LIORESAL) 10 MG tablet Take 1 tablet by mouth Daily As Needed for Muscle Spasms. 20 tablet 0   • Cholecalciferol (VITAMIN D) 2000 units tablet Take 2,000 Units by mouth Daily.     • dicyclomine (BENTYL) 20 MG tablet TAKE ONE TABLET BY MOUTH FOUR TIMES A DAY BEFORE MEALS AND AT BEDTIME AS NEEDED FOR CRAMPING FOR UP TO 30 DAYS 90 tablet 2   • estradiol (ESTRACE) 1 MG tablet Take 1 mg by mouth Daily.     • ibuprofen (ADVIL,MOTRIN) 800 MG tablet Take 1 tablet by mouth Every 8 (Eight) Hours As Needed for Mild Pain. 60 tablet 0   • levothyroxine (SYNTHROID, LEVOTHROID) 75 MCG tablet 1 tablet daily 90 tablet 2   • medroxyPROGESTERone (PROVERA) 2.5 MG tablet      • omeprazole (priLOSEC) 40 MG capsule TAKE ONE CAPSULE BY MOUTH DAILY FOR GERD 30 capsule 1   • valACYclovir (VALTREX) 1000 MG tablet Take 1 tablet by mouth 2 (Two) Times a Day. 30 tablet 2     No facility-administered medications prior to visit.       No opioid medication identified on active medication list. I have reviewed chart for other potential  high risk medication/s and harmful drug interactions in the elderly.          Aspirin is not on active medication list.  Aspirin use is not indicated based on review of current medical condition/s. Risk of harm outweighs potential benefits.  .    Patient Active Problem List   Diagnosis   • Abnormal weight gain   • Lymphocytic thyroiditis   • Hoarseness   • Hypothyroidism   • Sleep apnea   • Vitamin D deficiency   • Colon cancer screening   • Elevated fasting glucose   • Diverticulitis   • Family history of diabetes mellitus (DM)   • History of gestational diabetes mellitus (GDM)   • Prediabetes   • Essential hypertension   • Dysphagia   • Menopause syndrome     Advance Care Planning  Advance Directive is not on file.  ACP discussion was held with the patient during this visit. Patient does not have an advance directive, information provided.    Review of  "Systems   Constitutional: Negative.    Respiratory: Negative.    Cardiovascular: Negative.    Gastrointestinal: Negative.    Genitourinary: Negative.    Neurological: Negative.    Psychiatric/Behavioral: Negative.         Objective    Vitals:    10/17/22 0932   BP: 130/90   BP Location: Left arm   Patient Position: Sitting   Cuff Size: Large Adult   Pulse: 76   Temp: 98.2 °F (36.8 °C)   TempSrc: Infrared   SpO2: 98%   Weight: 87 kg (191 lb 12.8 oz)   Height: 170.2 cm (67.01\")   PainSc: 0-No pain     Estimated body mass index is 30.03 kg/m² as calculated from the following:    Height as of this encounter: 170.2 cm (67.01\").    Weight as of this encounter: 87 kg (191 lb 12.8 oz).    BMI is >= 30 and <35. (Class 1 Obesity). The following options were offered after discussion;: weight loss educational material (shared in after visit summary)      Does the patient have evidence of cognitive impairment? No    Physical Exam  HENT:      Head: Normocephalic.   Eyes:      Pupils: Pupils are equal, round, and reactive to light.   Cardiovascular:      Rate and Rhythm: Normal rate.      Pulses: Normal pulses.      Heart sounds: Normal heart sounds.   Pulmonary:      Effort: Pulmonary effort is normal.      Breath sounds: Normal breath sounds.   Abdominal:      General: Bowel sounds are normal.      Palpations: Abdomen is soft.   Musculoskeletal:         General: Normal range of motion.      Cervical back: Normal range of motion and neck supple.   Skin:     General: Skin is warm.      Findings: No rash.   Neurological:      General: No focal deficit present.      Mental Status: She is alert and oriented to person, place, and time.   Psychiatric:         Mood and Affect: Mood normal.         Behavior: Behavior normal.         Thought Content: Thought content normal.         Judgment: Judgment normal.       Lab Results   Component Value Date    HGBA1C 6.1 (H) 08/05/2022            HEALTH RISK ASSESSMENT    Smoking Status:  Social " History     Tobacco Use   Smoking Status Former   • Packs/day: 0.50   • Years: 5.00   • Pack years: 2.50   • Types: Cigarettes   • Quit date: 1990   • Years since quittin.8   Smokeless Tobacco Never     Alcohol Consumption:  Social History     Substance and Sexual Activity   Alcohol Use Yes    Comment: SOCIAL     Fall Risk Screen:    LIDIA Fall Risk Assessment was completed, and patient is at LOW risk for falls.Assessment completed on:10/17/2022    Depression Screening:  PHQ-2/PHQ-9 Depression Screening 10/17/2022   Retired PHQ-9 Total Score -   Retired Total Score -   Little Interest or Pleasure in Doing Things 1-->several days   Feeling Down, Depressed or Hopeless 1-->several days   Trouble Falling or Staying Asleep, or Sleeping Too Much 0-->not at all   Feeling Tired or Having Little Energy 0-->not at all   Poor Appetite or Overeating 0-->not at all   Feeling Bad about Yourself - or that You are a Failure or Have Let Yourself or Your Family Down 0-->not at all   Trouble Concentrating on Things, Such as Reading the Newspaper or Watching Television 0-->not at all   Moving or Speaking So Slowly that Other People Could Have Noticed? Or the Opposite - Being So Fidgety 0-->not at all   Thoughts that You Would be Better Off Dead or of Hurting Yourself in Some Way 0-->not at all   PHQ-9: Brief Depression Severity Measure Score 2   If You Checked Off Any Problems, How Difficult Have These Problems Made It For You to Do Your Work, Take Care of Things at Home, or Get Along with Other People? not difficult at all       Health Habits and Functional and Cognitive Screening:  Functional & Cognitive Status 10/17/2022   Do you have difficulty preparing food and eating? No   Do you have difficulty bathing yourself, getting dressed or grooming yourself? No   Do you have difficulty using the toilet? No   Do you have difficulty moving around from place to place? No   Do you have trouble with steps or getting out of a bed or  a chair? No   Current Diet Well Balanced Diet   Dental Exam Up to date   Eye Exam Not up to date   Exercise (times per week) 2 times per week   Current Exercises Include Walking   Do you need help using the phone?  No   Are you deaf or do you have serious difficulty hearing?  No   Do you need help with transportation? No   Do you need help shopping? No   Do you need help preparing meals?  No   Do you need help with housework?  No   Do you need help with laundry? No   Do you need help taking your medications? No   Do you need help managing money? No   Do you ever drive or ride in a car without wearing a seat belt? No   Have you felt unusual stress, anger or loneliness in the last month? Yes   Who do you live with? Child   If you need help, do you have trouble finding someone available to you? No   Have you been bothered in the last four weeks by sexual problems? No   Do you have difficulty concentrating, remembering or making decisions? No       Age-appropriate Screening Schedule:  Refer to the list below for future screening recommendations based on patient's age, sex and/or medical conditions. Orders for these recommended tests are listed in the plan section. The patient has been provided with a written plan.    Health Maintenance   Topic Date Due   • TDAP/TD VACCINES (1 - Tdap) Never done   • ZOSTER VACCINE (1 of 2) Never done   • INFLUENZA VACCINE  08/01/2022   • PAP SMEAR  11/06/2022   • DXA SCAN  07/08/2023   • MAMMOGRAM  03/31/2024              Assessment & Plan   CMS Preventative Services Quick Reference  Risk Factors Identified During Encounter  Hypothyroidism  The above risks/problems have been discussed with the patient.  Follow up actions/plans if indicated are seen below in the Assessment/Plan Section.  Pertinent information has been shared with the patient in the After Visit Summary.    Diagnoses and all orders for this visit:    1. Medicare annual wellness visit, subsequent (Primary)  -     Cancel: CBC  & Differential  -     Hepatitis C antibody  -     CBC & Differential; Future  -     CBC & Differential    2. Current mild episode of major depressive disorder without prior episode (HCC)  -     FLUoxetine (PROzac) 10 MG capsule; Take 1 capsule by mouth Daily.  Dispense: 45 capsule; Refill: 0  -     CBC & Differential; Future  -     CBC & Differential    3. Essential hypertension    4. Hypothyroidism, unspecified type    5. Irritable bowel syndrome, unspecified type    6. Gastroesophageal reflux disease without esophagitis  -     CBC & Differential; Future  -     CBC & Differential    7. Screening, iron deficiency anemia  -     Cancel: CBC & Differential  -     CBC & Differential; Future  -     CBC & Differential      Counseling was provided on nutrition, physical activity, injury prevention, eye and dental health, patient verbalizes understanding no additional questions were asked.    Follow Up:   Return in about 4 weeks (around 11/14/2022) for Recheck.     An After Visit Summary and PPPS were made available to the patient.    {Optional Chart Navigation Links Wrapup  Review (Popup)  Advance Care Planning  Labs  CC  Problem List  Visit Diagnosis  Medications  Result Review  Imaging  Health Maintenance  Quality  BestPractice  SmartSets  SnapShot  Encounters  Notes  Media  Procedures :23}    Will notify of lab results. Continue medication, diet and exercise regimen. Follow-up in 4 weeks.     Mask and gloves worn

## 2022-10-17 NOTE — PATIENT INSTRUCTIONS
Will notify of lab results. Continue medication, diet and exercise regimen. Follow-up in 4 weeks.

## 2022-11-14 ENCOUNTER — OFFICE VISIT (OUTPATIENT)
Dept: FAMILY MEDICINE CLINIC | Facility: CLINIC | Age: 67
End: 2022-11-14

## 2022-11-14 VITALS
HEIGHT: 67 IN | OXYGEN SATURATION: 97 % | HEART RATE: 92 BPM | DIASTOLIC BLOOD PRESSURE: 78 MMHG | SYSTOLIC BLOOD PRESSURE: 126 MMHG | WEIGHT: 187.8 LBS | TEMPERATURE: 98.2 F | BODY MASS INDEX: 29.47 KG/M2

## 2022-11-14 DIAGNOSIS — F33.41 RECURRENT MAJOR DEPRESSIVE DISORDER, IN PARTIAL REMISSION: Primary | ICD-10-CM

## 2022-11-14 DIAGNOSIS — F41.1 GAD (GENERALIZED ANXIETY DISORDER): ICD-10-CM

## 2022-11-14 PROCEDURE — 99213 OFFICE O/P EST LOW 20 MIN: CPT | Performed by: NURSE PRACTITIONER

## 2022-11-14 RX ORDER — FLUOXETINE 10 MG/1
10 CAPSULE ORAL DAILY
Qty: 90 CAPSULE | Refills: 0 | Status: SHIPPED | OUTPATIENT
Start: 2022-11-14 | End: 2022-11-28

## 2022-11-14 NOTE — PROGRESS NOTES
"Chief Complaint  1 month f/u prozac    Subjective        Carly De presents to [facility]  History of Present Illness   67-year-old female presenting for follow-up anxiety and depression.  During last visit on 10/17/2022 she reported intermittent episodes of depression related to multiple deaths in family within the last year, started patient on fluoxetine 10 mg daily, reports today her anxiety and depression is well controlled, blood pressure is also better controlled, will resume fluoxetine 10 mg daily and patient to follow-up if any changes in symptoms, denies SI.    Objective   Vital Signs:  /78   Pulse 92   Temp 98.2 °F (36.8 °C)   Ht 170.2 cm (67\")   Wt 85.2 kg (187 lb 12.8 oz)   SpO2 97%   BMI 29.41 kg/m²   Estimated body mass index is 29.41 kg/m² as calculated from the following:    Height as of this encounter: 170.2 cm (67\").    Weight as of this encounter: 85.2 kg (187 lb 12.8 oz).    BMI is >= 25 and <30. (Overweight) The following options were offered after discussion;: weight loss educational material (shared in after visit summary)      Physical Exam  Cardiovascular:      Rate and Rhythm: Normal rate.      Pulses: Normal pulses.      Heart sounds: Normal heart sounds.   Pulmonary:      Effort: Pulmonary effort is normal.      Breath sounds: Normal breath sounds.   Neurological:      General: No focal deficit present.      Mental Status: She is alert and oriented to person, place, and time.   Psychiatric:         Mood and Affect: Mood normal.         Behavior: Behavior normal.         Thought Content: Thought content normal. Thought content does not include suicidal ideation.         Judgment: Judgment normal.        Result Review :                Assessment and Plan   Diagnoses and all orders for this visit:    1. Recurrent major depressive disorder, in partial remission (HCC) (Primary)  -     FLUoxetine (PROzac) 10 MG capsule; Take 1 capsule by mouth " Daily.  Dispense: 90 capsule; Refill: 0    2. PEDRO (generalized anxiety disorder)  -     FLUoxetine (PROzac) 10 MG capsule; Take 1 capsule by mouth Daily.  Dispense: 90 capsule; Refill: 0             Follow Up   Return if symptoms worsen or fail to improve.  Patient was given instructions and counseling regarding her condition or for health maintenance advice. Please see specific information pulled into the AVS if appropriate.     Follow-up if any changes with anxiety/Depression and need increased dose.     Mask and gloves worn

## 2022-11-23 RX ORDER — OMEPRAZOLE 40 MG/1
CAPSULE, DELAYED RELEASE ORAL
Qty: 30 CAPSULE | Refills: 1 | Status: SHIPPED | OUTPATIENT
Start: 2022-11-23 | End: 2023-01-16 | Stop reason: SDUPTHER

## 2022-11-26 DIAGNOSIS — F41.1 GAD (GENERALIZED ANXIETY DISORDER): ICD-10-CM

## 2022-11-26 DIAGNOSIS — F33.41 RECURRENT MAJOR DEPRESSIVE DISORDER, IN PARTIAL REMISSION: ICD-10-CM

## 2022-11-27 DIAGNOSIS — M89.8X1 PAIN OF RIGHT SCAPULA: ICD-10-CM

## 2022-11-27 DIAGNOSIS — M25.532 LEFT WRIST PAIN: ICD-10-CM

## 2022-11-28 RX ORDER — FLUOXETINE 10 MG/1
CAPSULE ORAL
Qty: 45 CAPSULE | Refills: 1 | Status: SHIPPED | OUTPATIENT
Start: 2022-11-28 | End: 2023-03-01

## 2022-11-28 RX ORDER — DICYCLOMINE HCL 20 MG
TABLET ORAL
Qty: 90 TABLET | Refills: 2 | OUTPATIENT
Start: 2022-11-28

## 2022-11-29 DIAGNOSIS — M89.8X1 PAIN OF RIGHT SCAPULA: ICD-10-CM

## 2022-11-29 RX ORDER — IBUPROFEN 800 MG/1
TABLET ORAL
Qty: 60 TABLET | Refills: 0 | Status: SHIPPED | OUTPATIENT
Start: 2022-11-29 | End: 2023-02-13

## 2022-11-29 RX ORDER — BACLOFEN 10 MG/1
TABLET ORAL
Qty: 90 TABLET | Refills: 1 | Status: SHIPPED | OUTPATIENT
Start: 2022-11-29 | End: 2023-02-22

## 2022-12-01 RX ORDER — DICYCLOMINE HCL 20 MG
TABLET ORAL
Qty: 90 TABLET | Refills: 2 | Status: SHIPPED | OUTPATIENT
Start: 2022-12-01

## 2022-12-24 ENCOUNTER — TELEMEDICINE (OUTPATIENT)
Dept: FAMILY MEDICINE CLINIC | Facility: TELEHEALTH | Age: 67
End: 2022-12-24

## 2022-12-24 DIAGNOSIS — R22.0 SWELLING OF FACE: Primary | ICD-10-CM

## 2022-12-24 PROCEDURE — 99213 OFFICE O/P EST LOW 20 MIN: CPT | Performed by: NURSE PRACTITIONER

## 2022-12-24 RX ORDER — METHYLPREDNISOLONE 4 MG/1
TABLET ORAL
Qty: 21 TABLET | Refills: 0 | Status: SHIPPED | OUTPATIENT
Start: 2022-12-24 | End: 2023-02-13

## 2022-12-24 NOTE — PROGRESS NOTES
You have chosen to receive care through a telehealth visit.  Do you consent to use a video/audio connection for your medical care today? Yes     CHIEF COMPLAINT  No chief complaint on file.        HPI  Carly De is a 67 y.o. female  presents with complaint of mild swelling under both eyes, coughing up clear sputum, but denies fever, sinus congestion, nasal congestion.  Thinks may be she is allergic to something that she ate yesterday.     Review of Systems   See HPI    Past Medical History:   Diagnosis Date   • Allergic     Seasonal   • Arthritis    • Bursitis     RIGHT HIP   • Colon polyp 10/31/2017    Ascending colon: hyperplastic polyp, rectum: hyperplastic polyp   • Diverticulitis    • Dysphagia    • Fever blister     OCCASIONALLY   • GERD (gastroesophageal reflux disease)    • Hashimoto's thyroiditis    • Hypothyroidism    • Irritable bowel syndrome    • Migraine    • Sleep apnea     Compliant w/ Bi-Pap       Family History   Problem Relation Age of Onset   • Hypertension Mother    • Diabetes Mother            • Kidney disease Mother    • Heart disease Father            • Hypertension Brother    • Hypertension Brother    • Diabetes Brother    • Malig Hyperthermia Neg Hx        Social History     Socioeconomic History   • Marital status:    Tobacco Use   • Smoking status: Former     Packs/day: 0.50     Years: 5.00     Pack years: 2.50     Types: Cigarettes     Quit date: 1990     Years since quittin.0   • Smokeless tobacco: Never   Vaping Use   • Vaping Use: Never used   Substance and Sexual Activity   • Alcohol use: Yes     Comment: SOCIAL   • Drug use: No   • Sexual activity: Yes     Partners: Male     Birth control/protection: Post-menopausal       Carly De  reports that she quit smoking about 33 years ago. Her smoking use included cigarettes. She has a 2.50 pack-year smoking history. She has never used smokeless tobacco..              There were no vitals taken  for this visit.    PHYSICAL EXAM  Physical Exam   Constitutional: She is oriented to person, place, and time. She appears well-developed and well-nourished. She does not have a sickly appearance. She does not appear ill.   HENT:   Head: Normocephalic and atraumatic.   Mild swelling under bilateral eyes/maxillary region   Pulmonary/Chest: Effort normal.  No respiratory distress.  Neurological: She is alert and oriented to person, place, and time.         Diagnoses and all orders for this visit:    1. Swelling of face (Primary)  -     methylPREDNISolone (MEDROL) 4 MG dose pack; Take as directed on package instructions.  Dispense: 21 tablet; Refill: 0    --take medications as prescribed  --cool compresses to areas of swelling  --f/u in 2-3 days if no improvement; ER if swelling worsens or shortness of breath.         FOLLOW-UP  As discussed during visit with PCP/[facility] if no improvement or Urgent Care/Emergency Department if worsening of symptoms    Patient verbalizes understanding of medication dosage, comfort measures, instructions for treatment and follow-up.    Sneha Cisneros, APRN  12/24/2022  14:26 EST    The use of a video visit has been reviewed with the patient and verbal informed consent has been obtained. Myself and Carly De participated in this visit. The patient is located in [address] [place] [address].    I am located in [place], [place]. Mychart and Zoom were utilized. I spent 8 minutes in the patient's chart for this visit.

## 2023-01-16 RX ORDER — OMEPRAZOLE 40 MG/1
40 CAPSULE, DELAYED RELEASE ORAL DAILY
Qty: 90 CAPSULE | Refills: 1 | Status: SHIPPED | OUTPATIENT
Start: 2023-01-16

## 2023-01-19 RX ORDER — LEVOTHYROXINE SODIUM 0.07 MG/1
TABLET ORAL
Qty: 90 TABLET | Refills: 1 | Status: SHIPPED | OUTPATIENT
Start: 2023-01-19

## 2023-01-25 RX ORDER — DICYCLOMINE HCL 20 MG
TABLET ORAL
Qty: 90 TABLET | Refills: 2 | OUTPATIENT
Start: 2023-01-25

## 2023-01-25 NOTE — TELEPHONE ENCOUNTER
Caller: [facility] - [place], [place] - [address] - [phone] [facility] [phone]     Relationship: Pharmacy    Best call back number:  [phone]     Requested Prescriptions:   Requested Prescriptions     Pending Prescriptions Disp Refills   • dicyclomine (BENTYL) 20 MG tablet 90 tablet 2        Pharmacy where request should be sent: [facility]        Additional details provided by patient:     RECEIVED A CALL FROM [facility]ONI REGARDING GETTING APPROVAL FOR PT'S MEDICATION ABOVE. PT REQUESTED REFILL ON 1/14/23 AND IS POTENTIALLY OUT OF MEDICATION AT THIS TIME.     Does the patient have less than a 3 day supply:  [] Yes  [] No UNKNOWN     Would you like a call back once the refill request has been completed: [x] Yes [] No    If the office needs to give you a call back, can they leave a voicemail: [] Yes [] No    Faith Mortensen Rep   01/25/23 10:13 EST

## 2023-02-03 ENCOUNTER — TELEPHONE (OUTPATIENT)
Dept: ENDOCRINOLOGY | Age: 68
End: 2023-02-03
Payer: MEDICARE

## 2023-02-05 DIAGNOSIS — E03.9 ACQUIRED HYPOTHYROIDISM: Primary | ICD-10-CM

## 2023-02-05 DIAGNOSIS — E55.9 VITAMIN D DEFICIENCY: ICD-10-CM

## 2023-02-05 DIAGNOSIS — R73.03 PREDIABETES: ICD-10-CM

## 2023-02-08 DIAGNOSIS — E55.9 VITAMIN D DEFICIENCY: ICD-10-CM

## 2023-02-08 DIAGNOSIS — R73.03 PREDIABETES: ICD-10-CM

## 2023-02-08 DIAGNOSIS — E03.9 ACQUIRED HYPOTHYROIDISM: ICD-10-CM

## 2023-02-08 LAB
25(OH)D3+25(OH)D2 SERPL-MCNC: 43.5 NG/ML (ref 30–100)
ALBUMIN SERPL-MCNC: 4.3 G/DL (ref 3.5–5.2)
ALBUMIN/GLOB SERPL: 1.7 G/DL
ALP SERPL-CCNC: 79 U/L (ref 39–117)
ALT SERPL-CCNC: 9 U/L (ref 1–33)
AST SERPL-CCNC: 18 U/L (ref 1–32)
BILIRUB SERPL-MCNC: 0.7 MG/DL (ref 0–1.2)
BUN SERPL-MCNC: 15 MG/DL (ref 8–23)
BUN/CREAT SERPL: 17.9 (ref 7–25)
CALCIUM SERPL-MCNC: 9.3 MG/DL (ref 8.6–10.5)
CHLORIDE SERPL-SCNC: 103 MMOL/L (ref 98–107)
CHOLEST SERPL-MCNC: 179 MG/DL (ref 0–200)
CO2 SERPL-SCNC: 28.5 MMOL/L (ref 22–29)
CREAT SERPL-MCNC: 0.84 MG/DL (ref 0.57–1)
EGFRCR SERPLBLD CKD-EPI 2021: 75.8 ML/MIN/1.73
GLOBULIN SER CALC-MCNC: 2.6 GM/DL
GLUCOSE SERPL-MCNC: 99 MG/DL (ref 65–99)
HBA1C MFR BLD: 6.3 % (ref 4.8–5.6)
HDLC SERPL-MCNC: 52 MG/DL (ref 40–60)
IMP & REVIEW OF LAB RESULTS: NORMAL
LDLC SERPL CALC-MCNC: 109 MG/DL (ref 0–100)
POTASSIUM SERPL-SCNC: 4.4 MMOL/L (ref 3.5–5.2)
PROT SERPL-MCNC: 6.9 G/DL (ref 6–8.5)
SODIUM SERPL-SCNC: 138 MMOL/L (ref 136–145)
TRIGL SERPL-MCNC: 96 MG/DL (ref 0–150)
TSH SERPL DL<=0.005 MIU/L-ACNC: 1.76 UIU/ML (ref 0.27–4.2)
VLDLC SERPL CALC-MCNC: 18 MG/DL (ref 5–40)

## 2023-02-13 ENCOUNTER — OFFICE VISIT (OUTPATIENT)
Dept: FAMILY MEDICINE CLINIC | Facility: CLINIC | Age: 68
End: 2023-02-13
Payer: MEDICARE

## 2023-02-13 VITALS
OXYGEN SATURATION: 99 % | DIASTOLIC BLOOD PRESSURE: 80 MMHG | WEIGHT: 193 LBS | SYSTOLIC BLOOD PRESSURE: 145 MMHG | BODY MASS INDEX: 30.29 KG/M2 | HEIGHT: 67 IN | RESPIRATION RATE: 16 BRPM | TEMPERATURE: 97.8 F | HEART RATE: 83 BPM

## 2023-02-13 DIAGNOSIS — I10 ESSENTIAL HYPERTENSION: ICD-10-CM

## 2023-02-13 DIAGNOSIS — M25.552 LEFT HIP PAIN: Primary | ICD-10-CM

## 2023-02-13 DIAGNOSIS — E03.9 ACQUIRED HYPOTHYROIDISM: ICD-10-CM

## 2023-02-13 PROCEDURE — 99214 OFFICE O/P EST MOD 30 MIN: CPT | Performed by: NURSE PRACTITIONER

## 2023-02-13 RX ORDER — MELOXICAM 7.5 MG/1
7.5 TABLET ORAL DAILY
Qty: 30 TABLET | Refills: 1 | Status: SHIPPED | OUTPATIENT
Start: 2023-02-13 | End: 2023-03-01 | Stop reason: SDUPTHER

## 2023-02-13 RX ORDER — TIMOLOL MALEATE 5 MG/ML
SOLUTION/ DROPS OPHTHALMIC
COMMUNITY
Start: 2022-11-22

## 2023-02-22 ENCOUNTER — OFFICE VISIT (OUTPATIENT)
Dept: ENDOCRINOLOGY | Age: 68
End: 2023-02-22
Payer: MEDICARE

## 2023-02-22 VITALS
WEIGHT: 192.6 LBS | HEART RATE: 75 BPM | DIASTOLIC BLOOD PRESSURE: 92 MMHG | HEIGHT: 67 IN | OXYGEN SATURATION: 100 % | BODY MASS INDEX: 30.23 KG/M2 | SYSTOLIC BLOOD PRESSURE: 140 MMHG | TEMPERATURE: 97.7 F

## 2023-02-22 DIAGNOSIS — R73.03 PREDIABETES: ICD-10-CM

## 2023-02-22 DIAGNOSIS — Z83.3 FAMILY HISTORY OF DIABETES MELLITUS (DM): ICD-10-CM

## 2023-02-22 DIAGNOSIS — E03.9 ACQUIRED HYPOTHYROIDISM: Primary | ICD-10-CM

## 2023-02-22 DIAGNOSIS — G47.30 SLEEP APNEA, UNSPECIFIED TYPE: ICD-10-CM

## 2023-02-22 DIAGNOSIS — I10 ESSENTIAL HYPERTENSION: ICD-10-CM

## 2023-02-22 DIAGNOSIS — E55.9 VITAMIN D DEFICIENCY: ICD-10-CM

## 2023-02-22 PROCEDURE — 99214 OFFICE O/P EST MOD 30 MIN: CPT | Performed by: INTERNAL MEDICINE

## 2023-02-22 RX ORDER — METFORMIN HYDROCHLORIDE 500 MG/1
TABLET, EXTENDED RELEASE ORAL
Qty: 90 TABLET | Refills: 2 | Status: SHIPPED | OUTPATIENT
Start: 2023-02-22

## 2023-02-22 NOTE — PROGRESS NOTES
Subjective   Carly De is a 68 y.o. female.     History of Present Illness          Patient is a 68-year-old female who came in for follow-up.       She has primary hypothyroidism and is on levothyroxine 88 mcg/day.  She has no history of goiter or head/neck radiation therapy.  TSH done in February 2023 is normal at 1.76.     She has vitamin D deficiency and is on vitamin D 2000 units 7 tablets on Sundays.  25 hydroxyvitamin D in  February 2023 is normal at 43.5 ng/mL     She has prediabetes.  She has history of gestational diabetes mellitus with one pregnancy.  Her mother and a brother have diabetes mellitus.  Her fasting glucose in December 2020 is elevated at 116 mg per DL.  She has nocturia 1-2 times a night.  She denies polyuria or polydipsia.  She has gained 3 pounds since August 2022.      Hemoglobin A1c done in August 2022 is 6.1% with a normal fasting glucose at 95 mg per DL.  Hemoglobin A1c done in February 2023 is 6.3%.    She had an eye exam in November 2022 and was told to have glaucoma and cataracts.  She did not have diabetic retinopathy.     She went into her natural menopause at age 55.  She switched to continuous estradiol and medroxyprogesterone in 2022 and has been amenorrheic since.  She had a normal bone density done at [facility] in July 2021.   She follows with her gynecologist Dr. Perez.     She has sleep apnea and is not using CPAP regularly.  She wakes up rested most of the time.  She denies waking herself up snoring.  She has not seen a sleep specialist for a while.     She has no history of hypertension.  She has been on hydrochlorothiazide 25 mg once a day as needed for pedal edema.     She is retired  at [employer].      The following portions of the patient's history were reviewed and updated as appropriate: allergies, current medications, past family history, past medical history, past social history, past surgical history and problem list.    Review of Systems   Eyes:  "Negative for visual disturbance.   Respiratory: Negative.  Negative for shortness of breath.    Cardiovascular: Negative.  Negative for chest pain and palpitations.   Gastrointestinal: Negative.    Genitourinary: Negative.    Musculoskeletal: Negative for myalgias.     Vitals:    02/22/23 1050   BP: 140/92   Pulse: 75   Temp: 97.7 °F (36.5 °C)   TempSrc: Temporal   SpO2: 100%   Weight: 87.4 kg (192 lb 9.6 oz)   Height: 170.2 cm (67.01\")      Objective   Physical Exam  Constitutional:       General: She is not in acute distress.     Appearance: Normal appearance. She is not ill-appearing, toxic-appearing or diaphoretic.   Eyes:      General: No scleral icterus.        Right eye: No discharge.         Left eye: No discharge.      Extraocular Movements: Extraocular movements intact.      Conjunctiva/sclera: Conjunctivae normal.   Neck:      Vascular: No carotid bruit.   Cardiovascular:      Rate and Rhythm: Normal rate and regular rhythm.      Heart sounds: Normal heart sounds. No murmur heard.    No friction rub. No gallop.   Pulmonary:      Effort: No respiratory distress.      Breath sounds: Normal breath sounds. No stridor. No rales.   Chest:      Chest wall: No tenderness.   Abdominal:      General: Bowel sounds are normal.      Palpations: Abdomen is soft.      Tenderness: There is no right CVA tenderness or left CVA tenderness.   Musculoskeletal:      Cervical back: No tenderness.      Right lower leg: No edema.      Left lower leg: No edema.   Lymphadenopathy:      Cervical: No cervical adenopathy.   Skin:     General: Skin is warm and dry.   Neurological:      Mental Status: She is alert and oriented to person, place, and time.       Orders Only on 02/08/2023   Component Date Value Ref Range Status   • Total Cholesterol 02/08/2023 179  0 - 200 mg/dL Final    Comment: Cholesterol Reference Ranges  (U.S. Department of Health and Human Services ATP III  Classifications)  Desirable          <200 mg/dL  Borderline " High    200-239 mg/dL  High Risk          >240 mg/dL  Triglyceride Reference Ranges  (U.S. Department of Health and Human Services ATP III  Classifications)  Normal           <150 mg/dL  Borderline High  150-199 mg/dL  High             200-499 mg/dL  Very High        >500 mg/dL  HDL Reference Ranges  (U.S. Department of Health and Human Services ATP III  Classifications)  Low     <40 mg/dl (major risk factor for CHD)  High    >60 mg/dl ('negative' risk factor for CHD)  LDL Reference Ranges  (U.S. Department of Health and Human Services ATP III  Classifications)  Optimal          <100 mg/dL  Near Optimal     100-129 mg/dL  Borderline High  130-159 mg/dL  High             160-189 mg/dL  Very High        >189 mg/dL     • Triglycerides 02/08/2023 96  0 - 150 mg/dL Final   • HDL Cholesterol 02/08/2023 52  40 - 60 mg/dL Final   • VLDL Cholesterol [place] 02/08/2023 18  5 - 40 mg/dL Final   • LDL Chol Calc ([facility]) 02/08/2023 109 (H)  0 - 100 mg/dL Final   • Hemoglobin A1C 02/08/2023 6.30 (H)  4.80 - 5.60 % Final    Comment: Hemoglobin A1C Ranges:  Increased Risk for Diabetes  5.7% to 6.4%  Diabetes                     >= 6.5%  Diabetic Goal                < 7.0%     • Glucose 02/08/2023 99  65 - 99 mg/dL Final   • BUN 02/08/2023 15  8 - 23 mg/dL Final   • Creatinine 02/08/2023 0.84  0.57 - 1.00 mg/dL Final   • EGFR Result 02/08/2023 75.8  >60.0 mL/min/1.73 Final    Comment: GFR Normal >60  Chronic Kidney Disease <60  Kidney Failure <15     • BUN/Creatinine Ratio 02/08/2023 17.9  7.0 - 25.0 Final   • Sodium 02/08/2023 138  136 - 145 mmol/L Final   • Potassium 02/08/2023 4.4  3.5 - 5.2 mmol/L Final   • Chloride 02/08/2023 103  98 - 107 mmol/L Final   • Total CO2 02/08/2023 28.5  22.0 - 29.0 mmol/L Final   • Calcium 02/08/2023 9.3  8.6 - 10.5 mg/dL Final   • Total Protein 02/08/2023 6.9  6.0 - 8.5 g/dL Final   • Albumin 02/08/2023 4.3  3.5 - 5.2 g/dL Final   • Globulin 02/08/2023 2.6  gm/dL Final   • A/G Ratio 02/08/2023 1.7  g/dL  Final   • Total Bilirubin 02/08/2023 0.7  0.0 - 1.2 mg/dL Final   • Alkaline Phosphatase 02/08/2023 79  39 - 117 U/L Final   • AST (SGOT) 02/08/2023 18  1 - 32 U/L Final   • ALT (SGPT) 02/08/2023 9  1 - 33 U/L Final   • 25 Hydroxy, Vitamin D 02/08/2023 43.5  30.0 - 100.0 ng/mL Final    Comment: Reference Range for Total Vitamin D 25(OH)  Deficiency <20.0 ng/mL  Insufficiency 21-29 ng/mL  Sufficiency  ng/mL  Toxicity >100 ng/ml     • TSH 02/08/2023 1.760  0.270 - 4.200 uIU/mL Final   • Interpretation 02/08/2023 Note   Final    Supplemental report is available.     Assessment & Plan   Diagnoses and all orders for this visit:    1. Acquired hypothyroidism (Primary)  -     TSH; Future  -     T4, Free; Future  -     Comprehensive Metabolic Panel; Future  -     Lipid Panel; Future    2. Vitamin D deficiency  -     Vitamin D,25-Hydroxy; Future    3. Prediabetes  -     Comprehensive Metabolic Panel; Future  -     Hemoglobin A1c; Future  -     Lipid Panel; Future    4. Essential hypertension  -     Comprehensive Metabolic Panel; Future    5. Family history of diabetes mellitus (DM)  -     Comprehensive Metabolic Panel; Future  -     Hemoglobin A1c; Future    6. Sleep apnea, unspecified type    Other orders  -     metFORMIN ER (GLUCOPHAGE-XR) 500 MG 24 hr tablet; 1 tablet daily with supper.  Dispense: 90 tablet; Refill: 2      Continue levothyroxine 88 mcg a day  Continue vitamin D3 2000 units 7 tablets weekly.    Start metformin  mg once a day with meals.  Continue no concentrated sweet diet.  Follow-up with gynecologist.    Copy of my note sent to Luisa Lynn NP.    RTC 4 mos.

## 2023-03-01 ENCOUNTER — OFFICE VISIT (OUTPATIENT)
Dept: FAMILY MEDICINE CLINIC | Facility: CLINIC | Age: 68
End: 2023-03-01
Payer: MEDICARE

## 2023-03-01 VITALS
TEMPERATURE: 98.9 F | SYSTOLIC BLOOD PRESSURE: 146 MMHG | DIASTOLIC BLOOD PRESSURE: 84 MMHG | HEART RATE: 101 BPM | OXYGEN SATURATION: 94 % | HEIGHT: 67 IN | WEIGHT: 193 LBS | BODY MASS INDEX: 30.29 KG/M2

## 2023-03-01 DIAGNOSIS — F32.A ANXIETY AND DEPRESSION: Primary | ICD-10-CM

## 2023-03-01 DIAGNOSIS — J01.10 ACUTE NON-RECURRENT FRONTAL SINUSITIS: ICD-10-CM

## 2023-03-01 DIAGNOSIS — M25.552 LEFT HIP PAIN: ICD-10-CM

## 2023-03-01 DIAGNOSIS — H61.23 BILATERAL IMPACTED CERUMEN: ICD-10-CM

## 2023-03-01 DIAGNOSIS — I10 ESSENTIAL HYPERTENSION: ICD-10-CM

## 2023-03-01 DIAGNOSIS — F41.9 ANXIETY AND DEPRESSION: Primary | ICD-10-CM

## 2023-03-01 PROCEDURE — 69209 REMOVE IMPACTED EAR WAX UNI: CPT | Performed by: NURSE PRACTITIONER

## 2023-03-01 PROCEDURE — 99214 OFFICE O/P EST MOD 30 MIN: CPT | Performed by: NURSE PRACTITIONER

## 2023-03-01 RX ORDER — MELOXICAM 7.5 MG/1
7.5 TABLET ORAL DAILY
Qty: 30 TABLET | Refills: 2 | Status: SHIPPED | OUTPATIENT
Start: 2023-03-01

## 2023-03-01 RX ORDER — MONTELUKAST SODIUM 10 MG/1
10 TABLET ORAL NIGHTLY
Qty: 90 TABLET | Refills: 1 | Status: SHIPPED | OUTPATIENT
Start: 2023-03-01

## 2023-03-01 RX ORDER — FLUOXETINE HYDROCHLORIDE 20 MG/1
20 CAPSULE ORAL DAILY
Qty: 30 CAPSULE | Refills: 1 | Status: SHIPPED | OUTPATIENT
Start: 2023-03-01

## 2023-03-01 NOTE — PROGRESS NOTES
"Chief Complaint  Hypertension and Med Refill (Med review)    Subjective        Carly De presents to [facility]  History of Present Illness  Patient presents the office today for a follow-up on hypertension.  Blood pressure is 146/84.  She denies chest pain shortness of air.  She is not currently taking blood pressure medication at this time.  She is following Dr. Wood for hypothyroidism.  She is up-to-date on her mammogram and her Medicare wellness visit.  Colonoscopy is up-to-date.      Objective   Vital Signs:  /84 (BP Location: Left arm, Patient Position: Sitting, Cuff Size: Adult)   Pulse 101   Temp 98.9 °F (37.2 °C)   Ht 170.2 cm (67.01\")   Wt 87.5 kg (193 lb)   SpO2 94%   BMI 30.22 kg/m²   Estimated body mass index is 30.22 kg/m² as calculated from the following:    Height as of this encounter: 170.2 cm (67.01\").    Weight as of this encounter: 87.5 kg (193 lb).       BMI is >= 30 and <35. (Class 1 Obesity). The following options were offered after discussion;: weight loss educational material (shared in after visit summary)      Physical Exam  Constitutional:       General: She is not in acute distress.     Appearance: Normal appearance.   HENT:      Head: Normocephalic.      Right Ear: There is impacted cerumen.      Left Ear: There is impacted cerumen.   Eyes:      Pupils: Pupils are equal, round, and reactive to light.   Cardiovascular:      Rate and Rhythm: Normal rate.      Pulses: Normal pulses.      Heart sounds: Normal heart sounds.   Pulmonary:      Effort: Pulmonary effort is normal.      Breath sounds: Normal breath sounds.   Abdominal:      General: Bowel sounds are normal.      Palpations: Abdomen is soft.   Musculoskeletal:         General: Normal range of motion.      Cervical back: Normal range of motion and neck supple.   Skin:     General: Skin is warm.   Neurological:      General: No focal deficit present.      Mental Status: She is " alert and oriented to person, place, and time.   Psychiatric:         Mood and Affect: Mood normal.         Behavior: Behavior normal.         Thought Content: Thought content normal.         Judgment: Judgment normal.        Result Review :            Ear Cerumen Removal    Date/Time: 3/1/2023 1:40 PM  Performed by: Luisa Lynn APRN  Authorized by: Luisa Lynn APRN   Consent: Verbal consent obtained.  Risks and benefits: risks, benefits and alternatives were discussed  Consent given by: patient  Patient understanding: patient states understanding of the procedure being performed  Patient consent: the patient's understanding of the procedure matches consent given  Procedure consent: procedure consent matches procedure scheduled  Relevant documents: relevant documents present and verified  Test results: test results available and properly labeled  Site marked: the operative site was marked  Imaging studies: imaging studies available  Patient identity confirmed: verbally with patient    Anesthesia:  Local Anesthetic: none  Location details: right ear and left ear  Patient tolerance: patient tolerated the procedure well with no immediate complications  Procedure type: irrigation   Sedation:  Patient sedated: no              Assessment and Plan   Diagnoses and all orders for this visit:    1. Anxiety and depression (Primary)  -     FLUoxetine (PROzac) 20 MG capsule; Take 1 capsule by mouth Daily.  Dispense: 30 capsule; Refill: 1    2. Essential hypertension    3. Left hip pain  -     meloxicam (Mobic) 7.5 MG tablet; Take 1 tablet by mouth Daily.  Dispense: 30 tablet; Refill: 2    4. Acute non-recurrent frontal sinusitis  -     montelukast (Singulair) 10 MG tablet; Take 1 tablet by mouth Every Night.  Dispense: 90 tablet; Refill: 1    5. Bilateral impacted cerumen  -     Cerumen Removal    .anxiety and depression improved but still not controlled will increase to prozac 20mg    HTN Disussed with patient  the importance of maintaining a normal BMI.  Reviewed the Dash diet, dietary sodium restriction.  Encourage the patient to engage in 30 minutes of regular aerobic physical activity at least 3 days a week.  Limit alcohol consumption to no more than 2 drinks per day for men, 1 drink per day for women.  Patient voiced understanding all questions answered.    Left hip pain stable with mobic    Sinusitis  Advised patient to rest and to increase fluid intake.  Tylenol or Motrin for fever, pain or discomfort as directed.  Discussed medication instructions and side effects with patient.  Follow-up if symptoms persist or worsen.    Bilateral impacted cerumen  Patient tolerated procedure without difficulty        I spent 30 minutes caring for Carly on this date of service. This time includes time spent by me in the following activities:referring and communicating with other health care professionals , documenting information in the medical record, independently interpreting results and communicating that information with the patient/family/caregiver and care coordination  Follow Up   No follow-ups on file.  Patient was given instructions and counseling regarding her condition or for health maintenance advice. Please see specific information pulled into the AVS if appropriate.

## 2023-04-14 ENCOUNTER — OFFICE VISIT (OUTPATIENT)
Dept: FAMILY MEDICINE CLINIC | Facility: CLINIC | Age: 68
End: 2023-04-14
Payer: MEDICARE

## 2023-04-14 VITALS
OXYGEN SATURATION: 97 % | DIASTOLIC BLOOD PRESSURE: 88 MMHG | HEART RATE: 85 BPM | TEMPERATURE: 97.8 F | HEIGHT: 67 IN | BODY MASS INDEX: 29.66 KG/M2 | SYSTOLIC BLOOD PRESSURE: 130 MMHG | WEIGHT: 189 LBS

## 2023-04-14 DIAGNOSIS — R41.3 MEMORY CHANGE: ICD-10-CM

## 2023-04-14 DIAGNOSIS — F41.9 ANXIETY AND DEPRESSION: Primary | ICD-10-CM

## 2023-04-14 DIAGNOSIS — R68.89 FORGETFULNESS: ICD-10-CM

## 2023-04-14 DIAGNOSIS — I10 ESSENTIAL HYPERTENSION: ICD-10-CM

## 2023-04-14 DIAGNOSIS — F32.A ANXIETY AND DEPRESSION: Primary | ICD-10-CM

## 2023-04-14 DIAGNOSIS — M54.2 CERVICAL PAIN: ICD-10-CM

## 2023-04-14 DIAGNOSIS — Z51.81 MEDICATION MONITORING ENCOUNTER: ICD-10-CM

## 2023-04-14 LAB
ALBUMIN SERPL-MCNC: 4.9 G/DL (ref 3.5–5.2)
ALBUMIN/GLOB SERPL: 2 G/DL
ALP SERPL-CCNC: 77 U/L (ref 39–117)
ALT SERPL-CCNC: 10 U/L (ref 1–33)
AST SERPL-CCNC: 19 U/L (ref 1–32)
BILIRUB SERPL-MCNC: 0.7 MG/DL (ref 0–1.2)
BUN SERPL-MCNC: 15 MG/DL (ref 8–23)
BUN/CREAT SERPL: 17 (ref 7–25)
CALCIUM SERPL-MCNC: 10 MG/DL (ref 8.6–10.5)
CHLORIDE SERPL-SCNC: 102 MMOL/L (ref 98–107)
CO2 SERPL-SCNC: 25 MMOL/L (ref 22–29)
CREAT SERPL-MCNC: 0.88 MG/DL (ref 0.57–1)
EGFRCR SERPLBLD CKD-EPI 2021: 71.7 ML/MIN/1.73
GLOBULIN SER CALC-MCNC: 2.5 GM/DL
GLUCOSE SERPL-MCNC: 100 MG/DL (ref 65–99)
POTASSIUM SERPL-SCNC: 4.4 MMOL/L (ref 3.5–5.2)
PROT SERPL-MCNC: 7.4 G/DL (ref 6–8.5)
SODIUM SERPL-SCNC: 139 MMOL/L (ref 136–145)

## 2023-04-14 RX ORDER — BACLOFEN 10 MG/1
10 TABLET ORAL 3 TIMES DAILY
Qty: 30 TABLET | Refills: 0 | Status: SHIPPED | OUTPATIENT
Start: 2023-04-14

## 2023-04-14 RX ORDER — FLUOXETINE HYDROCHLORIDE 20 MG/1
20 CAPSULE ORAL DAILY
Qty: 90 CAPSULE | Refills: 1 | Status: SHIPPED | OUTPATIENT
Start: 2023-04-14

## 2023-04-14 NOTE — PROGRESS NOTES
"Chief Complaint  Anxiety, Depression, and Hypertension    Subjective        Carly De presents to [facility]  History of Present Illness  Patient was office today for follow-up on anxiety, depression and hypertension.  She was started on Prozac 20 mg for anxiety and depression; she reports this has helped with symptoms.  She is also having and left-sided neck pain which started about 4 weeks ago.  She went to [facility] 2 weeks ago was started on baclofen with some improvement but is still having left-sided neck pain.  She has decreased range of motion.  She denies any known injury to her neck.  I will like to further evaluate with a neck x-ray.  She has additional complaints and concerns of forgetfulness.  She reports family and friends have noticed a change.  She reports mother and grandmother have had had Alzheimer's.    Blood pressure 130/88.        Objective   Vital Signs:  /88 (BP Location: Left arm, Patient Position: Sitting, Cuff Size: Adult)   Pulse 85   Temp 97.8 °F (36.6 °C)   Ht 170.2 cm (67.01\")   Wt 85.7 kg (189 lb)   SpO2 97%   BMI 29.59 kg/m²   Estimated body mass index is 29.59 kg/m² as calculated from the following:    Height as of this encounter: 170.2 cm (67.01\").    Weight as of this encounter: 85.7 kg (189 lb).       BMI is >= 25 and <30. (Overweight) The following options were offered after discussion;: weight loss educational material (shared in after visit summary)      Physical Exam  Constitutional:       General: She is not in acute distress.     Appearance: Normal appearance.   HENT:      Head: Normocephalic.   Eyes:      Pupils: Pupils are equal, round, and reactive to light.   Cardiovascular:      Rate and Rhythm: Normal rate.      Pulses: Normal pulses.      Heart sounds: Normal heart sounds.   Pulmonary:      Effort: Pulmonary effort is normal.      Breath sounds: Normal breath sounds.   Musculoskeletal:         General: " Tenderness present.      Cervical back: Neck supple. Spasms and tenderness present. No swelling, edema, deformity, erythema, signs of trauma, lacerations, rigidity, torticollis, bony tenderness or crepitus. No pain with movement. Decreased range of motion.   Skin:     General: Skin is warm.   Neurological:      General: No focal deficit present.      Mental Status: She is alert and oriented to person, place, and time.   Psychiatric:         Mood and Affect: Mood is anxious. Mood is not depressed.         Behavior: Behavior normal.         Thought Content: Thought content normal.         Judgment: Judgment normal.        Result Review :  The following data was reviewed by: JANUARY Christy on 04/14/2023:  Common labs        8/5/2022    09:06 10/17/2022    10:41 2/8/2023    09:26   Common Labs   Glucose 95    99     BUN 17    15     Creatinine 0.76    0.84     Sodium 140    138     Potassium 4.3    4.4     Chloride 102    103     Calcium 9.6    9.3     Total Protein 7.0    6.9     Albumin 4.3    4.3     Total Bilirubin 0.5    0.7     Alkaline Phosphatase 79    79     AST (SGOT) 20    18     ALT (SGPT) 11    9     WBC  4.80      Hemoglobin  13.8      Hematocrit  42.4      Platelets  308      Total Cholesterol   179     Triglycerides   96     HDL Cholesterol   52     LDL Cholesterol    109     Hemoglobin A1C 6.1    6.30                    Assessment and Plan   Diagnoses and all orders for this visit:    1. Anxiety and depression (Primary)  -     FLUoxetine (PROzac) 20 MG capsule; Take 1 capsule by mouth Daily.  Dispense: 90 capsule; Refill: 1  -     Ambulatory Referral to Psychiatry    2. Essential hypertension  -     Comprehensive metabolic panel    3. Cervical pain  -     XR Spine Cervical 2 or 3 View; Future  -     baclofen (LIORESAL) 10 MG tablet; Take 1 tablet by mouth 3 (Three) Times a Day.  Dispense: 30 tablet; Refill: 0    4. Medication monitoring encounter  -     Comprehensive metabolic panel    5.  Memory change  -     MRI Brain Without Contrast; Future    6. Forgetfulness  -     MRI Brain Without Contrast; Future      Anxiety  and depression are improved with Prozac 20 mg.  I would like to further evaluate and refer to psychiatrist for additional work-up.    Hypertension stable diet and exercise.    Cervical pain more left-sided patient was prescribed baclofen which did help I will refill this and check x-ray.    Medication monitoring patient is taking meloxicam I would like to check renal status.           I spent 30 minutes caring for Carly on this date of service. This time includes time spent by me in the following activities:preparing for the visit, reviewing tests, obtaining and/or reviewing a separately obtained history, performing a medically appropriate examination and/or evaluation , counseling and educating the patient/family/caregiver, ordering medications, tests, or procedures, referring and communicating with other health care professionals , documenting information in the medical record, independently interpreting results and communicating that information with the patient/family/caregiver and care coordination  Follow Up   Return in about 4 weeks (around 5/12/2023) for Recheck.  Patient was given instructions and counseling regarding her condition or for health maintenance advice. Please see specific information pulled into the AVS if appropriate.

## 2023-04-17 ENCOUNTER — HOSPITAL ENCOUNTER (OUTPATIENT)
Dept: GENERAL RADIOLOGY | Facility: HOSPITAL | Age: 68
Discharge: HOME | End: 2023-04-17
Admitting: NURSE PRACTITIONER
Payer: MEDICARE

## 2023-04-17 DIAGNOSIS — M54.2 CERVICAL PAIN: ICD-10-CM

## 2023-04-17 PROCEDURE — 72040 X-RAY EXAM NECK SPINE 2-3 VW: CPT

## 2023-04-19 DIAGNOSIS — M54.2 CERVICAL PAIN: Primary | ICD-10-CM

## 2023-05-09 ENCOUNTER — TREATMENT (OUTPATIENT)
Dept: PHYSICAL THERAPY | Facility: CLINIC | Age: 68
End: 2023-05-09
Payer: MEDICARE

## 2023-05-09 DIAGNOSIS — M54.2 CERVICAL PAIN: Primary | ICD-10-CM

## 2023-05-09 PROCEDURE — 97110 THERAPEUTIC EXERCISES: CPT | Performed by: PHYSICAL THERAPIST

## 2023-05-09 PROCEDURE — 97161 PT EVAL LOW COMPLEX 20 MIN: CPT | Performed by: PHYSICAL THERAPIST

## 2023-05-09 NOTE — PROGRESS NOTES
Physical Therapy Initial Evaluation and Plan of Care  [address], [address]  [place] [address]    Patient: Carly De   : 1955  Diagnosis/ICD-10 Code:  Cervical pain [M54.2]  Referring practitioner: JANUARY Christy  Date of Initial Visit: 2023  Today's Date: 2023  Patient seen for 1 session         Visit Diagnoses:    ICD-10-CM ICD-9-CM   1. Cervical pain  M54.2 723.1         Subjective Questionnaire: NDI:14      Subjective Evaluation    History of Present Illness  Mechanism of injury: Patient reports that her neck has been bothering her for about 3 weeks.  Went to her PCP and X-rays were taken which showed degenerative changes.  Reports being limited when she turned her neck initially, but this has gotten better.  Patient denies any previous neck injuries.        Patient Occupation: Retired Pain  Current pain ratin  At worst pain rating: 3  Location: left side of the neck  Quality: dull ache  Relieving factors: medications  Exacerbated by: turning her neck.  Progression: improved    Hand dominance: right             Objective          Postural Observations    Additional Postural Observation Details  Minimal protracted shoulders and forward head.    Palpation     Additional Palpation Details  Minimal TTP to the left UT.    Active Range of Motion   Cervical/Thoracic Spine   Cervical    Flexion: Neck active flexion: WNL.   Extension: Neck active extension: WNL.   Left lateral flexion: Neck active lateral bend left: WNL.   Right lateral flexion: Neck active lateral bend right: WNL.   Left rotation: Neck active rotation left: WNL.   Right rotation: Neck active rotation right: WNL.     Additional Active Range of Motion Details  Shoulder AROM: WNL    Strength/Myotome Testing     Left Shoulder     Planes of Motion   Flexion: 4   Abduction: 4   External rotation at 0°: 4   Internal rotation at 0°: 4     Right Shoulder     Planes of Motion   Flexion: 4   Abduction: 4    External rotation at 0°: 4   Internal rotation at 0°: 4     Left Elbow   Flexion: 4  Extension: 4    Right Elbow   Flexion: 4  Extension: 4          Assessment & Plan     Assessment  Impairments: impaired physical strength, lacks appropriate home exercise program and pain with function    Assessment details: Patient presents with c/o pain, TTP and a slight decrease in shoulder and elbow strength.  Patient reports that she is doing ADL'S without limitations.    Barriers to therapy: none  Prognosis: good  Prognosis details: STG's to be met by 2 weeks  1)  Independent with HEP  2)  Decrease pain by 50% or more  3)  No TTP present    LTG's to be met by 4 weeks  1)  Independent with HEP progression  2)  Decrease pain by 75% or more  3)  Increase strength for the shoulder and elbow to 4+/5       Plan  Therapy options: will be seen for skilled therapy services  Planned therapy interventions: strengthening, stretching, therapeutic activities and home exercise program  Frequency: 1x week  Duration in weeks: 4  Treatment plan discussed with: patient            Timed:         Manual Therapy:    0     mins  72985;     Therapeutic Exercise:    18     mins  95433;     Neuromuscular Frenando:    0    mins  75588;    Therapeutic Activity:     0     mins  63039;     Gait Trainin     mins  75172;     Ultrasound:     0     mins  50622;          Un-Timed:  Electrical Stimulation:    0     mins  20674 ( );    Low Eval     15     Mins  24092  Mod Eval     0     Mins  75533  High Eval                       0     Mins  29434        Timed Treatment:   18   mins   Total Treatment:     33   mins          PT: Geovany Hernandez, PT     [place] License [id number]  Electronically signed by Geovany Hernandez PT, 23, 9:55 AM EDT    Certification Period: 2023 thru 2023  I certify that the therapy services are furnished while this patient is under my care.  The services outlined above are required by this patient, and will be  reviewed every 90 days.    Luisa Lynn, Aprn  [address]  [place] [address]   NPI: [id number]      Geovany Hernandez, PT   License number: [id number]        Physician Signature:__________________________________________________    PHYSICIAN: Luisa Lynn, JANUARY      DATE:     Please sign and return via fax to [email or web address] . Thank you, [facility].

## 2023-05-12 ENCOUNTER — OFFICE VISIT (OUTPATIENT)
Dept: FAMILY MEDICINE CLINIC | Facility: CLINIC | Age: 68
End: 2023-05-12
Payer: MEDICARE

## 2023-05-12 VITALS
OXYGEN SATURATION: 99 % | TEMPERATURE: 97.3 F | DIASTOLIC BLOOD PRESSURE: 98 MMHG | WEIGHT: 187.6 LBS | HEART RATE: 77 BPM | SYSTOLIC BLOOD PRESSURE: 148 MMHG | HEIGHT: 67 IN | BODY MASS INDEX: 29.44 KG/M2

## 2023-05-12 DIAGNOSIS — F32.A ANXIETY AND DEPRESSION: Primary | ICD-10-CM

## 2023-05-12 DIAGNOSIS — F41.9 ANXIETY AND DEPRESSION: Primary | ICD-10-CM

## 2023-05-12 DIAGNOSIS — I10 ESSENTIAL HYPERTENSION: ICD-10-CM

## 2023-05-12 DIAGNOSIS — R68.89 FORGETFULNESS: ICD-10-CM

## 2023-05-12 NOTE — PROGRESS NOTES
"Chief Complaint  Anxiety and Depression (4 week follow up-meds are helping/)    Subjective        Carly De presents to [facility]  History of Present Illness  Patient presents office today for follow-up on anxiety and depression.  She has upcoming appointment with psychiatry.  She was started on Prozac 20 mg and medicine is helping.  Blood pressure slightly elevated today 148/98.  She is not taking daily blood pressure medication I would like for her to keep a blood pressure log follow-up with me in 1 month.  He reports episodes of forgetfulness stable has an upcoming appointment scheduled with an MRI.                Objective   Vital Signs:  /98 (BP Location: Left arm, Patient Position: Sitting, Cuff Size: Adult)   Pulse 77   Temp 97.3 °F (36.3 °C)   Ht 170.2 cm (67.01\")   Wt 85.1 kg (187 lb 9.6 oz)   SpO2 99%   BMI 29.38 kg/m²   Estimated body mass index is 29.38 kg/m² as calculated from the following:    Height as of this encounter: 170.2 cm (67.01\").    Weight as of this encounter: 85.1 kg (187 lb 9.6 oz).             Physical Exam  Constitutional:       General: She is not in acute distress.     Appearance: Normal appearance.   HENT:      Head: Normocephalic.   Eyes:      Pupils: Pupils are equal, round, and reactive to light.   Cardiovascular:      Rate and Rhythm: Normal rate.      Pulses: Normal pulses.      Heart sounds: Normal heart sounds.   Pulmonary:      Effort: Pulmonary effort is normal.      Breath sounds: Normal breath sounds.   Abdominal:      General: Bowel sounds are normal.      Palpations: Abdomen is soft.   Musculoskeletal:         General: Normal range of motion.      Cervical back: Normal range of motion and neck supple.   Skin:     General: Skin is warm.   Neurological:      General: No focal deficit present.      Mental Status: She is alert and oriented to person, place, and time.   Psychiatric:         Mood and Affect: Mood normal.       "   Behavior: Behavior normal.         Thought Content: Thought content normal.         Judgment: Judgment normal.        Result Review :                   Assessment and Plan   Diagnoses and all orders for this visit:    1. Anxiety and depression (Primary)  Assessment & Plan:  Patient's depression is recurrent and is moderate without psychosis. Their depression is currently active and the condition is improving with treatment. This will be reassessed follows psych in June 7 . F/U as described:follows psych.      2. Essential hypertension    3. Forgetfulness  Assessment & Plan:  Symptoms have remained stable she has an upcoming MRI next month     Anxiety and depression denies suicidal homicidal ideation    Essential hypertension.  Patient blood pressure elevated today she denies being symptomatic.  She has been checking blood pressures at home with readings ranging in the 120s to the 140s.  Over 80s to 90s.  She is to continue to keep a BP log I still would like to follow-up in 1 month.    Forgetfulness no new symptoms has MRI appointment scheduled         Follow Up   Return in about 4 weeks (around 6/9/2023) for Recheck.  Patient was given instructions and counseling regarding her condition or for health maintenance advice. Please see specific information pulled into the AVS if appropriate.

## 2023-05-12 NOTE — ASSESSMENT & PLAN NOTE
Patient's depression is recurrent and is moderate without psychosis. Their depression is currently active and the condition is improving with treatment. This will be reassessed follows psych in June 7 . F/U as described:follows psych.

## 2023-06-06 ENCOUNTER — HOSPITAL ENCOUNTER (OUTPATIENT)
Dept: MRI IMAGING | Facility: HOSPITAL | Age: 68
Discharge: HOME | End: 2023-06-06
Admitting: NURSE PRACTITIONER
Payer: MEDICARE

## 2023-06-06 DIAGNOSIS — R41.3 MEMORY CHANGE: ICD-10-CM

## 2023-06-06 DIAGNOSIS — R68.89 FORGETFULNESS: ICD-10-CM

## 2023-06-06 PROCEDURE — 70551 MRI BRAIN STEM W/O DYE: CPT

## 2023-06-07 DIAGNOSIS — R93.89 ABNORMAL MRI: Primary | ICD-10-CM

## 2023-06-07 DIAGNOSIS — R68.89 FORGETFULNESS: ICD-10-CM

## 2023-06-09 ENCOUNTER — OFFICE VISIT (OUTPATIENT)
Dept: FAMILY MEDICINE CLINIC | Facility: CLINIC | Age: 68
End: 2023-06-09
Payer: MEDICARE

## 2023-06-09 ENCOUNTER — HOSPITAL ENCOUNTER (OUTPATIENT)
Dept: GENERAL RADIOLOGY | Facility: HOSPITAL | Age: 68
Discharge: HOME | End: 2023-06-09
Payer: MEDICARE

## 2023-06-09 VITALS
SYSTOLIC BLOOD PRESSURE: 122 MMHG | TEMPERATURE: 97.5 F | HEIGHT: 67 IN | BODY MASS INDEX: 29.03 KG/M2 | WEIGHT: 185 LBS | DIASTOLIC BLOOD PRESSURE: 84 MMHG | HEART RATE: 68 BPM | OXYGEN SATURATION: 96 %

## 2023-06-09 DIAGNOSIS — M25.561 CHRONIC PAIN OF RIGHT KNEE: ICD-10-CM

## 2023-06-09 DIAGNOSIS — F41.9 ANXIETY AND DEPRESSION: ICD-10-CM

## 2023-06-09 DIAGNOSIS — I10 ESSENTIAL HYPERTENSION: Primary | ICD-10-CM

## 2023-06-09 DIAGNOSIS — F32.A ANXIETY AND DEPRESSION: ICD-10-CM

## 2023-06-09 DIAGNOSIS — G89.29 CHRONIC PAIN OF RIGHT KNEE: ICD-10-CM

## 2023-06-09 DIAGNOSIS — R68.89 FORGETFULNESS: ICD-10-CM

## 2023-06-09 PROCEDURE — 3044F HG A1C LEVEL LT 7.0%: CPT | Performed by: NURSE PRACTITIONER

## 2023-06-09 PROCEDURE — 1159F MED LIST DOCD IN RCRD: CPT | Performed by: NURSE PRACTITIONER

## 2023-06-09 PROCEDURE — 1160F RVW MEDS BY RX/DR IN RCRD: CPT | Performed by: NURSE PRACTITIONER

## 2023-06-09 PROCEDURE — 99214 OFFICE O/P EST MOD 30 MIN: CPT | Performed by: NURSE PRACTITIONER

## 2023-06-09 PROCEDURE — 3079F DIAST BP 80-89 MM HG: CPT | Performed by: NURSE PRACTITIONER

## 2023-06-09 PROCEDURE — 73560 X-RAY EXAM OF KNEE 1 OR 2: CPT

## 2023-06-09 PROCEDURE — 3074F SYST BP LT 130 MM HG: CPT | Performed by: NURSE PRACTITIONER

## 2023-06-09 NOTE — PROGRESS NOTES
"Chief Complaint  Anxiety (Pt is here today to follow up on anxiety and depression) and Depression    Subjective        Carly De presents to [facility]  History of Present Illness  Patient presents to the officer today for a follow up on anxiety , depression , elevated blood pressure. She is following psychiatrist and has an upcoming appt with grief counselor. She is stable on prozac she is currently taking prozac 30mg. She pa si hi. She has a normal bp today at 122/84. She denies cp soa. She has had incrased right knee pain over the past few months. She is wearing a knee brace. She exercises and has been doing bertha. I have advised her to use stretching exercises and wear knee brace for extra stability.   Discussed patient episodes of forgetfullness which is  still present; I reviewed with her the MRI results. I have placed referral to neurologist   Objective   Vital Signs:  /84   Pulse 68   Temp 97.5 °F (36.4 °C)   Ht 170.2 cm (67.01\")   Wt 83.9 kg (185 lb)   SpO2 96%   BMI 28.97 kg/m²   Estimated body mass index is 28.97 kg/m² as calculated from the following:    Height as of this encounter: 170.2 cm (67.01\").    Weight as of this encounter: 83.9 kg (185 lb).               Physical Exam  Constitutional:       General: She is not in acute distress.     Appearance: Normal appearance.   HENT:      Head: Normocephalic.   Eyes:      Pupils: Pupils are equal, round, and reactive to light.   Cardiovascular:      Rate and Rhythm: Normal rate.      Pulses: Normal pulses.      Heart sounds: Normal heart sounds.   Pulmonary:      Effort: Pulmonary effort is normal.      Breath sounds: Normal breath sounds.   Musculoskeletal:         General: Tenderness present. Normal range of motion.      Cervical back: Normal range of motion and neck supple.      Right knee: No swelling or erythema. Decreased range of motion. Tenderness present.      Left knee: Normal.   Skin:     " General: Skin is warm.   Neurological:      General: No focal deficit present.      Mental Status: She is alert and oriented to person, place, and time.   Psychiatric:         Mood and Affect: Mood normal.         Behavior: Behavior normal.         Thought Content: Thought content normal.         Judgment: Judgment normal.      Result Review :  The following data was reviewed by: JANUARY Christy on 06/09/2023:  Common labs          10/17/2022    10:41 2/8/2023    09:26 4/14/2023    10:13   Common Labs   Glucose  99  100    BUN  15  15    Creatinine  0.84  0.88    Sodium  138  139    Potassium  4.4  4.4    Chloride  103  102    Calcium  9.3  10.0    Total Protein  6.9  7.4    Albumin  4.3  4.9    Total Bilirubin  0.7  0.7    Alkaline Phosphatase  79  77    AST (SGOT)  18  19    ALT (SGPT)  9  10    WBC 4.80      Hemoglobin 13.8      Hematocrit 42.4      Platelets 308      Total Cholesterol  179     Triglycerides  96     HDL Cholesterol  52     LDL Cholesterol   109     Hemoglobin A1C  6.30         Reviewed MRI head, referred patient to neurology            Assessment and Plan   Diagnoses and all orders for this visit:    1. Essential hypertension (Primary)    2. Anxiety and depression    3. Chronic pain of right knee  -     XR Knee 1 or 2 View Right; Future    4. Forgetfulness    HTN controlled; patient to work on diet and exercise    Anxiety and depression denies SI HI; she is following psych and a grief counselor     Knee pain cont knee brace, motrin as needed; check xray     Discussed MRI results refer to neurology        I spent 15   minutes caring for Carly on this date of service. This time includes time spent by me in the following activities:preparing for the visit, reviewing tests, obtaining and/or reviewing a separately obtained history, performing a medically appropriate examination and/or evaluation , counseling and educating the patient/family/caregiver, ordering medications, tests, or  procedures, referring and communicating with other health care professionals , documenting information in the medical record, independently interpreting results and communicating that information with the patient/family/caregiver, and care coordination  Follow Up   Return in about 3 months (around 9/9/2023) for Recheck.  Patient was given instructions and counseling regarding her condition or for health maintenance advice. Please see specific information pulled into the AVS if appropriate.

## 2023-07-31 ENCOUNTER — LAB (OUTPATIENT)
Dept: LAB | Facility: HOSPITAL | Age: 68
End: 2023-07-31
Payer: MEDICARE

## 2023-07-31 ENCOUNTER — OFFICE VISIT (OUTPATIENT)
Dept: NEUROLOGY | Facility: CLINIC | Age: 68
End: 2023-07-31
Payer: MEDICARE

## 2023-07-31 VITALS
SYSTOLIC BLOOD PRESSURE: 138 MMHG | HEART RATE: 88 BPM | OXYGEN SATURATION: 98 % | WEIGHT: 189.2 LBS | HEIGHT: 67 IN | BODY MASS INDEX: 29.7 KG/M2 | DIASTOLIC BLOOD PRESSURE: 78 MMHG

## 2023-07-31 DIAGNOSIS — R41.89 SUBJECTIVE COGNITIVE IMPAIRMENT: ICD-10-CM

## 2023-07-31 DIAGNOSIS — R41.89 SUBJECTIVE COGNITIVE IMPAIRMENT: Primary | ICD-10-CM

## 2023-07-31 LAB
FOLATE SERPL-MCNC: 17.64 NG/ML (ref 4.78–24.2)
VIT B12 BLD-MCNC: 759 PG/ML (ref 211–946)

## 2023-07-31 PROCEDURE — 3078F DIAST BP <80 MM HG: CPT | Performed by: STUDENT IN AN ORGANIZED HEALTH CARE EDUCATION/TRAINING PROGRAM

## 2023-07-31 PROCEDURE — 1160F RVW MEDS BY RX/DR IN RCRD: CPT | Performed by: STUDENT IN AN ORGANIZED HEALTH CARE EDUCATION/TRAINING PROGRAM

## 2023-07-31 PROCEDURE — 84425 ASSAY OF VITAMIN B-1: CPT | Performed by: STUDENT IN AN ORGANIZED HEALTH CARE EDUCATION/TRAINING PROGRAM

## 2023-07-31 PROCEDURE — 1159F MED LIST DOCD IN RCRD: CPT | Performed by: STUDENT IN AN ORGANIZED HEALTH CARE EDUCATION/TRAINING PROGRAM

## 2023-07-31 PROCEDURE — 82607 VITAMIN B-12: CPT

## 2023-07-31 PROCEDURE — 82746 ASSAY OF FOLIC ACID SERUM: CPT

## 2023-07-31 PROCEDURE — 3075F SYST BP GE 130 - 139MM HG: CPT | Performed by: STUDENT IN AN ORGANIZED HEALTH CARE EDUCATION/TRAINING PROGRAM

## 2023-07-31 PROCEDURE — 99203 OFFICE O/P NEW LOW 30 MIN: CPT | Performed by: STUDENT IN AN ORGANIZED HEALTH CARE EDUCATION/TRAINING PROGRAM

## 2023-07-31 PROCEDURE — 36415 COLL VENOUS BLD VENIPUNCTURE: CPT

## 2023-07-31 NOTE — LETTER
August 12, 2023     JANUARY Christy  [address]  [place] [address]    Patient: Carly De   YOB: 1955   Date of Visit: 7/31/2023     Dear JANUARY Christy:       Thank you for referring Carly De to me for evaluation. Below are the relevant portions of my assessment and plan of care.    If you have questions, please do not hesitate to call me. I look forward to following Carly along with you.         Sincerely,        Franky Garcia MD        CC: Franky Koo MD  08/12/23 1814  Sign when Signing Visit  Chief Complaint   Patient presents with    Memory Loss       Patient ID: Carly De is a 68 y.o. female.    HPI:    Ms. Carly De presents as a new patient referred by JANUARY Christy. She has had episodes of forgetfulness per primary care referral note as well as an MRI which showed no acute abnormalities with mild changes of chronic small vessel ischemic phenomenon. She also has a history of anxiety and depression. She has a history of hypothyroidism which is being treated with levothyroxine. No B12 checked recently at [facility]. A1c is in the prediabetic range and cholesterol is elevated specifically while total cholesterol is normal. Her LDL is elevated in the 109 mg/dL as of 02/2024.    The patient reports that her grandchildren are telling her that she is forgetting things. She notes that she will put down something and she will have to find it again. She states that she will go out of the room to find it, and forget what she is looking for.. She notes that sometimes she can not find the remote to her TV. She states that sometimes she will go to the refrigerator and she will forget what she is going to refrigerator for. She notes that she will occasionally go into a room and forget why she goes there. She states that she has been noticing these symptoms for approximately 6 months. She notes that her symptoms have been  about the same during that time. She states that she is worried about her short-term memory. She notes that her long-term memory is similar to where it was before. She states that she is able to dress herself without difficulty. She notes that she lives with her 2 grandchildren and her son. She states that she remembers to eat food. She denies any trouble remembering to eat. She notes that she takes care of her own hygiene. She states that she is able to bathe, dress, and take care of her hair. She notes that she is getting up and about around, walking, and getting up from bed. She states that she does the cooking in the house most of the time. She denies any troubles with cooking. She notes that she will forget about the laundry being finished and forget to put in a dryer. She states that she takes care of her own finances. She notes that she is able to drive without difficulty. She states that she does laundry at home sometimes. She notes that her grandchildren will cook sometimes. She states that she is taking her medications. She denies forgetting to take her medications. She states that she drinks occasionally. She notes that she drinks a couple of glasses of wine. She states that she does Michelet twice a week. She notes that she is trying to introduce yoga.    The patient reports that her mother and maternal grandmother had dementia. She states that her mother was in her 70's or close to her 80's when they started showing signs of dementia. She notes that she remembers her mother having dementia at home.    The following portions of the patient's history were reviewed and updated as appropriate: allergies, current medications, past family history, past medical history, past social history, past surgical history and problem list.    Review of Systems   Neurological:  Negative for dizziness, tremors, seizures, syncope, facial asymmetry, speech difficulty, weakness, light-headedness, numbness and headaches.    Psychiatric/Behavioral:  Positive for confusion and decreased concentration. Negative for agitation, behavioral problems, dysphoric mood, hallucinations, self-injury, sleep disturbance and suicidal ideas. The patient is nervous/anxious. The patient is not hyperactive.       6 months of short term memory, son and grandkids. Driving without difficulty, manages finances. Mother and grandmother had dementia.    Vitals:    07/31/23 0910   Pulse: 88   SpO2: 98%       Neurologic Exam     Mental Status   Normal clock draw, MMSE not recorded exactly but was between 25 to 30     Physical Exam    Procedures    An MRI showed no acute abnormalities with mild changes of chronic small vessel ischemic phenomenon.     Assessment/Plan:      The patient has subjective cognitive impairment. Factors may include history of anxiety and depression. It appears stable per clinical history and that has not gotten worse over the last 6 months. I ordered unique lab tests today including B12, folate, and vitamin B1. MDM level 3. Also spent 35 minutes in the care of this patient today. She had a normal clock draw, which is reassuring.    She will follow up in 6 months.    I spent 35 minutes in the care of this patient today including pre-revisit chart review and post visit documentation.  I spent 35 minutes caring for this patient on this date of service. This time includes time spent by me in the following activities as necessary: preparing for the visit, reviewing tests, medical records and previous visits, obtaining and/or reviewing a separately obtained history, performing a medically appropriate exam and/or evaluation, counseling and educating the patient, and/or communicating with other healthcare professionals, documenting information in the medical record, independently interpreting results and communicating that information with the patient, and developing a medically appropriate treatment plan with consideration of other conditions,  medications, and treatments.       There are no diagnoses linked to this encounter.       Aleja Jennings MA  Transcribed from ambient dictation for Franky Garcia MD by Bailey Og.  07/31/23   11:15 EDT    Patient or patient representative verbalized consent to the visit recording.  I have personally performed the services described in this document as transcribed by the above individual, and it is both accurate and complete.  Franky Garcia MD  8/12/2023  18:11 EDT

## 2023-07-31 NOTE — LETTER
July 31, 2023       No Recipients    Patient: Carly De   YOB: 1955   Date of Visit: 7/31/2023     Dear JANUARY Christy:       Thank you for referring Carly De to me for evaluation. Below are the relevant portions of my assessment and plan of care.    If you have questions, please do not hesitate to call me. I look forward to following Carly along with you.         Sincerely,        Franky Garcia MD        CC:   No Recipients

## 2023-07-31 NOTE — PROGRESS NOTES
Chief Complaint   Patient presents with    Memory Loss       Patient ID: Carly De is a 68 y.o. female.    HPI:    Ms. Carly De presents as a new patient referred by JANUARY Christy. She has had episodes of forgetfulness per primary care referral note as well as an MRI which showed no acute abnormalities with mild changes of chronic small vessel ischemic phenomenon. She also has a history of anxiety and depression. She has a history of hypothyroidism which is being treated with levothyroxine. No B12 checked recently at [facility]. A1c is in the prediabetic range and cholesterol is elevated specifically while total cholesterol is normal. Her LDL is elevated in the 109 mg/dL as of 02/2024.    The patient reports that her grandchildren are telling her that she is forgetting things. She notes that she will put down something and she will have to find it again. She states that she will go out of the room to find it, and forget what she is looking for.. She notes that sometimes she can not find the remote to her TV. She states that sometimes she will go to the refrigerator and she will forget what she is going to refrigerator for. She notes that she will occasionally go into a room and forget why she goes there. She states that she has been noticing these symptoms for approximately 6 months. She notes that her symptoms have been about the same during that time. She states that she is worried about her short-term memory. She notes that her long-term memory is similar to where it was before. She states that she is able to dress herself without difficulty. She notes that she lives with her 2 grandchildren and her son. She states that she remembers to eat food. She denies any trouble remembering to eat. She notes that she takes care of her own hygiene. She states that she is able to bathe, dress, and take care of her hair. She notes that she is getting up and about around, walking, and getting up from bed.  She states that she does the cooking in the house most of the time. She denies any troubles with cooking. She notes that she will forget about the laundry being finished and forget to put in a dryer. She states that she takes care of her own finances. She notes that she is able to drive without difficulty. She states that she does laundry at home sometimes. She notes that her grandchildren will cook sometimes. She states that she is taking her medications. She denies forgetting to take her medications. She states that she drinks occasionally. She notes that she drinks a couple of glasses of wine. She states that she does Michelet twice a week. She notes that she is trying to introduce yoga.    The patient reports that her mother and maternal grandmother had dementia. She states that her mother was in her 70's or close to her 80's when they started showing signs of dementia. She notes that she remembers her mother having dementia at home.    The following portions of the patient's history were reviewed and updated as appropriate: allergies, current medications, past family history, past medical history, past social history, past surgical history and problem list.    Review of Systems   Neurological:  Negative for dizziness, tremors, seizures, syncope, facial asymmetry, speech difficulty, weakness, light-headedness, numbness and headaches.   Psychiatric/Behavioral:  Positive for confusion and decreased concentration. Negative for agitation, behavioral problems, dysphoric mood, hallucinations, self-injury, sleep disturbance and suicidal ideas. The patient is nervous/anxious. The patient is not hyperactive.       6 months of short term memory, son and grandkids. Driving without difficulty, manages finances. Mother and grandmother had dementia.    Vitals:    07/31/23 0910   Pulse: 88   SpO2: 98%       Neurologic Exam     Mental Status   Normal clock draw, MMSE not recorded exactly but was between 25 to 30     Physical  Exam    Procedures    An MRI showed no acute abnormalities with mild changes of chronic small vessel ischemic phenomenon.     Assessment/Plan:      The patient has subjective cognitive impairment. Factors may include history of anxiety and depression. It appears stable per clinical history and that has not gotten worse over the last 6 months. I ordered unique lab tests today including B12, folate, and vitamin B1. MDM level 3. Also spent 35 minutes in the care of this patient today. She had a normal clock draw, which is reassuring.    She will follow up in 6 months.    I spent 35 minutes in the care of this patient today including pre-revisit chart review and post visit documentation.  I spent 35 minutes caring for this patient on this date of service. This time includes time spent by me in the following activities as necessary: preparing for the visit, reviewing tests, medical records and previous visits, obtaining and/or reviewing a separately obtained history, performing a medically appropriate exam and/or evaluation, counseling and educating the patient, and/or communicating with other healthcare professionals, documenting information in the medical record, independently interpreting results and communicating that information with the patient, and developing a medically appropriate treatment plan with consideration of other conditions, medications, and treatments.       There are no diagnoses linked to this encounter.       Aleja Jennings MA  Transcribed from ambient dictation for Franky Garcia MD by Bailey Og.  07/31/23   11:15 EDT    Patient or patient representative verbalized consent to the visit recording.  I have personally performed the services described in this document as transcribed by the above individual, and it is both accurate and complete.  Franky Garcia MD  8/12/2023  18:11 EDT

## 2023-08-02 LAB — VIT B1 BLD-SCNC: 91.2 NMOL/L (ref 66.5–200)

## 2023-08-08 DIAGNOSIS — I10 ESSENTIAL HYPERTENSION: ICD-10-CM

## 2023-08-08 DIAGNOSIS — R73.03 PREDIABETES: ICD-10-CM

## 2023-08-08 DIAGNOSIS — E03.9 ACQUIRED HYPOTHYROIDISM: ICD-10-CM

## 2023-08-08 DIAGNOSIS — E55.9 VITAMIN D DEFICIENCY: ICD-10-CM

## 2023-08-08 DIAGNOSIS — Z83.3 FAMILY HISTORY OF DIABETES MELLITUS (DM): ICD-10-CM

## 2023-08-09 LAB
25(OH)D3+25(OH)D2 SERPL-MCNC: 41.4 NG/ML (ref 30–100)
ALBUMIN SERPL-MCNC: 4.4 G/DL (ref 3.5–5.2)
ALBUMIN/GLOB SERPL: 1.6 G/DL
ALP SERPL-CCNC: 76 U/L (ref 39–117)
ALT SERPL-CCNC: 15 U/L (ref 1–33)
AST SERPL-CCNC: 16 U/L (ref 1–32)
BILIRUB SERPL-MCNC: 0.3 MG/DL (ref 0–1.2)
BUN SERPL-MCNC: 16 MG/DL (ref 8–23)
BUN/CREAT SERPL: 19.8 (ref 7–25)
CALCIUM SERPL-MCNC: 10.1 MG/DL (ref 8.6–10.5)
CHLORIDE SERPL-SCNC: 104 MMOL/L (ref 98–107)
CHOLEST SERPL-MCNC: 183 MG/DL (ref 0–200)
CO2 SERPL-SCNC: 27.7 MMOL/L (ref 22–29)
CREAT SERPL-MCNC: 0.81 MG/DL (ref 0.57–1)
EGFRCR SERPLBLD CKD-EPI 2021: 79.2 ML/MIN/1.73
GLOBULIN SER CALC-MCNC: 2.8 GM/DL
GLUCOSE SERPL-MCNC: 116 MG/DL (ref 65–99)
HBA1C MFR BLD: 6.2 % (ref 4.8–5.6)
HDLC SERPL-MCNC: 53 MG/DL (ref 40–60)
IMP & REVIEW OF LAB RESULTS: NORMAL
LDLC SERPL CALC-MCNC: 111 MG/DL (ref 0–100)
POTASSIUM SERPL-SCNC: 4.3 MMOL/L (ref 3.5–5.2)
PROT SERPL-MCNC: 7.2 G/DL (ref 6–8.5)
SODIUM SERPL-SCNC: 142 MMOL/L (ref 136–145)
T4 FREE SERPL-MCNC: 1.18 NG/DL (ref 0.93–1.7)
TRIGL SERPL-MCNC: 106 MG/DL (ref 0–150)
TSH SERPL DL<=0.005 MIU/L-ACNC: 1.18 UIU/ML (ref 0.27–4.2)
VLDLC SERPL CALC-MCNC: 19 MG/DL (ref 5–40)

## 2023-08-10 ENCOUNTER — PATIENT ROUNDING (BHMG ONLY) (OUTPATIENT)
Dept: NEUROLOGY | Facility: CLINIC | Age: 68
End: 2023-08-10
Payer: MEDICARE

## 2023-08-22 ENCOUNTER — OFFICE VISIT (OUTPATIENT)
Dept: ENDOCRINOLOGY | Age: 68
End: 2023-08-22
Payer: MEDICARE

## 2023-08-22 VITALS
SYSTOLIC BLOOD PRESSURE: 108 MMHG | WEIGHT: 191.4 LBS | BODY MASS INDEX: 30.04 KG/M2 | OXYGEN SATURATION: 96 % | DIASTOLIC BLOOD PRESSURE: 80 MMHG | HEART RATE: 79 BPM | HEIGHT: 67 IN | TEMPERATURE: 96.4 F

## 2023-08-22 DIAGNOSIS — R73.03 PREDIABETES: ICD-10-CM

## 2023-08-22 DIAGNOSIS — G47.33 OBSTRUCTIVE SLEEP APNEA SYNDROME: ICD-10-CM

## 2023-08-22 DIAGNOSIS — E55.9 VITAMIN D DEFICIENCY: ICD-10-CM

## 2023-08-22 DIAGNOSIS — E03.9 PRIMARY HYPOTHYROIDISM: Primary | ICD-10-CM

## 2023-08-22 DIAGNOSIS — E78.5 HYPERLIPIDEMIA, UNSPECIFIED HYPERLIPIDEMIA TYPE: ICD-10-CM

## 2023-08-22 PROCEDURE — 3074F SYST BP LT 130 MM HG: CPT | Performed by: INTERNAL MEDICINE

## 2023-08-22 PROCEDURE — 3044F HG A1C LEVEL LT 7.0%: CPT | Performed by: INTERNAL MEDICINE

## 2023-08-22 PROCEDURE — 3079F DIAST BP 80-89 MM HG: CPT | Performed by: INTERNAL MEDICINE

## 2023-08-22 PROCEDURE — 99214 OFFICE O/P EST MOD 30 MIN: CPT | Performed by: INTERNAL MEDICINE

## 2023-08-22 RX ORDER — LEVOTHYROXINE SODIUM 0.07 MG/1
TABLET ORAL
Qty: 90 TABLET | Refills: 2 | Status: SHIPPED | OUTPATIENT
Start: 2023-08-22

## 2023-08-22 NOTE — LETTER
August 22, 2023     Luisa Lynn, JANUARY  [address] [facility] [address]  [facility] [address]    Patient: Carly De   YOB: 1955   Date of Visit: 8/22/2023     Dear JANUARY Christy:       Thank you for referring Carly De to me for evaluation. Below are the relevant portions of my assessment and plan of care.    If you have questions, please do not hesitate to call me. I look forward to following Carly along with you.         Sincerely,        Saul Gillespie MD        CC: MD Jose Miguel Diaz, Saul BARTLETT MD  08/22/23 3450  Sign when Signing Visit  Subjective  Carly De is a 68 y.o. female.     History of Present Illness       Patient is a 68-year-old female who came in for follow-up.       She has primary hypothyroidism and is on levothyroxine 88 mcg/day.  She has no history of goiter or head/neck radiation therapy.  She denies bowel changes.  She denies heat or cold intolerance.  TSH done in August 2023 is normal at 1.18.     She has vitamin D deficiency and is on vitamin D 2000 units daily.  25 hydroxyvitamin D done in August 2023 is normal at 41.4 ng/ML.     She has prediabetes since 2020.  She has history of gestational diabetes mellitus with one pregnancy.  Her mother and a brother have diabetes mellitus.  Her fasting glucose in December 2020 is elevated at 116 mg per DL.  She has nocturia 1-2 times a night.  She denies polyuria or polydipsia.       Hemoglobin A1c done in August 2022 is 6.1% with a normal fasting glucose at 95 mg per DL.  Hemoglobin A1c done in August 2023 is 6.2%.  She is on metformin  mg once a day.     She had an eye exam in November 2022 and was told to have glaucoma and cataracts.  She did not have diabetic retinopathy.    She has hyperlipidemia and is on diet alone.  Lipid panel done in August 2023 are as follows: Cholesterol 183.  HDL 53.  .  Triglycerides 106.  10-year risk of heart disease or stroke using American  "Heart Association calculator is 6.6%.     She went into her natural menopause at age 55.  She switched to continuous estradiol and medroxyprogesterone in 2022 and has been amenorrheic since.  She had a normal bone density done at [facility] in July 2021.   She follows with her gynecologist Dr. Perez.     She has sleep apnea and is not using CPAP regularly.  She wakes up rested most of the time.  She intermittently wakes herself up snoring.  She has not seen a sleep specialist for a while.     She has no history of hypertension, myocardial infarction or stroke.     She is retired  at [employer].    The following portions of the patient's history were reviewed and updated as appropriate: allergies, current medications, past family history, past medical history, past social history, past surgical history, and problem list.    Review of Systems   Eyes:  Positive for visual disturbance (When she drives at night.).   Respiratory:  Negative for shortness of breath.    Cardiovascular:  Negative for chest pain and palpitations.   Gastrointestinal: Negative.    Endocrine: Negative for polyuria.   Genitourinary: Negative.    Musculoskeletal:  Negative for myalgias.   Neurological:  Negative for numbness.   Vitals:    08/22/23 1021   BP: 108/80   Pulse: 79   Temp: 96.4 øF (35.8 øC)   TempSrc: Temporal   SpO2: 96%   Weight: 86.8 kg (191 lb 6.4 oz)   Height: 170.2 cm (67.01\")      Objective  Physical Exam  Constitutional:       General: She is not in acute distress.     Appearance: Normal appearance. She is not ill-appearing.   HENT:      Nose: Nose normal. No congestion or rhinorrhea.   Eyes:      General: No scleral icterus.        Right eye: No discharge.         Left eye: No discharge.      Extraocular Movements: Extraocular movements intact.      Conjunctiva/sclera: Conjunctivae normal.   Neck:      Vascular: No carotid bruit.   Cardiovascular:      Rate and Rhythm: Normal rate and regular rhythm.      Heart sounds: No " murmur heard.    No friction rub. No gallop.   Pulmonary:      Effort: No respiratory distress.      Breath sounds: Normal breath sounds. No stridor. No rales.   Chest:      Chest wall: No tenderness.   Abdominal:      General: Bowel sounds are normal.      Palpations: Abdomen is soft.      Tenderness: There is no right CVA tenderness or left CVA tenderness.   Musculoskeletal:      Cervical back: Normal range of motion.      Right lower leg: No edema.      Left lower leg: No edema.   Lymphadenopathy:      Cervical: No cervical adenopathy.   Skin:     General: Skin is warm and dry.   Neurological:      Mental Status: She is alert and oriented to person, place, and time.      Comments: Intact light touch on lower ext     Orders Only on 08/08/2023   Component Date Value Ref Range Status    Hemoglobin A1C 08/08/2023 6.20 (H)  4.80 - 5.60 % Final    Comment: Hemoglobin A1C Ranges:  Increased Risk for Diabetes  5.7% to 6.4%  Diabetes                     >= 6.5%  Diabetic Goal                < 7.0%      Total Cholesterol 08/08/2023 183  0 - 200 mg/dL Final    Comment: Cholesterol Reference Ranges  (U.S. Department of Health and Human Services ATP III  Classifications)  Desirable          <200 mg/dL  Borderline High    200-239 mg/dL  High Risk          >240 mg/dL  Triglyceride Reference Ranges  (U.S. Department of Health and Human Services ATP III  Classifications)  Normal           <150 mg/dL  Borderline High  150-199 mg/dL  High             200-499 mg/dL  Very High        >500 mg/dL  HDL Reference Ranges  (U.S. Department of Health and Human Services ATP III  Classifications)  Low     <40 mg/dl (major risk factor for CHD)  High    >60 mg/dl ('negative' risk factor for CHD)  LDL Reference Ranges  (U.S. Department of Health and Human Services ATP III  Classifications)  Optimal          <100 mg/dL  Near Optimal     100-129 mg/dL  Borderline High  130-159 mg/dL  High             160-189 mg/dL  Very High        >189 mg/dL       Triglycerides 08/08/2023 106  0 - 150 mg/dL Final    HDL Cholesterol 08/08/2023 53  40 - 60 mg/dL Final    VLDL Cholesterol [place] 08/08/2023 19  5 - 40 mg/dL Final    LDL Chol Calc (NIH) 08/08/2023 111 (H)  0 - 100 mg/dL Final    Glucose 08/08/2023 116 (H)  65 - 99 mg/dL Final    BUN 08/08/2023 16  8 - 23 mg/dL Final    Creatinine 08/08/2023 0.81  0.57 - 1.00 mg/dL Final    EGFR Result 08/08/2023 79.2  >60.0 mL/min/1.73 Final    Comment: GFR Normal >60  Chronic Kidney Disease <60  Kidney Failure <15      BUN/Creatinine Ratio 08/08/2023 19.8  7.0 - 25.0 Final    Sodium 08/08/2023 142  136 - 145 mmol/L Final    Potassium 08/08/2023 4.3  3.5 - 5.2 mmol/L Final    Chloride 08/08/2023 104  98 - 107 mmol/L Final    Total CO2 08/08/2023 27.7  22.0 - 29.0 mmol/L Final    Calcium 08/08/2023 10.1  8.6 - 10.5 mg/dL Final    Total Protein 08/08/2023 7.2  6.0 - 8.5 g/dL Final    Albumin 08/08/2023 4.4  3.5 - 5.2 g/dL Final    Globulin 08/08/2023 2.8  gm/dL Final    A/G Ratio 08/08/2023 1.6  g/dL Final    Total Bilirubin 08/08/2023 0.3  0.0 - 1.2 mg/dL Final    Alkaline Phosphatase 08/08/2023 76  39 - 117 U/L Final    AST (SGOT) 08/08/2023 16  1 - 32 U/L Final    ALT (SGPT) 08/08/2023 15  1 - 33 U/L Final    25 Hydroxy, Vitamin D 08/08/2023 41.4  30.0 - 100.0 ng/mL Final    Comment: Reference Range for Total Vitamin D 25(OH)  Deficiency <20.0 ng/mL  Insufficiency 21-29 ng/mL  Sufficiency  ng/mL  Toxicity >100 ng/ml      Free T4 08/08/2023 1.18  0.93 - 1.70 ng/dL Final    Results may be falsely increased if patient taking Biotin.    TSH 08/08/2023 1.180  0.270 - 4.200 uIU/mL Final    Interpretation 08/08/2023 Note   Final    Supplemental report is available.     Assessment & Plan  Diagnoses and all orders for this visit:    1. Primary hypothyroidism (Primary)  -     TSH; Future  -     T4, Free; Future    2. Vitamin D deficiency  -     Vitamin D,25-Hydroxy; Future    3. Prediabetes  -      Comprehensive Metabolic Panel; Future  -     Hemoglobin A1c; Future    4. Hyperlipidemia, unspecified hyperlipidemia type  -     Comprehensive Metabolic Panel; Future  -     Lipid Panel; Future    5. Obstructive sleep apnea syndrome  -     Comprehensive Metabolic Panel; Future    Other orders  -     levothyroxine (SYNTHROID, LEVOTHROID) 75 MCG tablet; 1 tablet daily  Dispense: 90 tablet; Refill: 2      Continue levothyroxine 88 mcg a day.  Continue vitamin D3 2000 units/day.  Continue metformin  mg/day.  Continue no concentrated sweet low-fat diet.  Follow-up with sleep specialist.  Flu vaccine this fall.    Copy of my note sent to Luisa Lynn NP and Dr. Meadows.    RTC 6 mos.

## 2023-08-22 NOTE — PROGRESS NOTES
Subjective   Carly De is a 68 y.o. female.     History of Present Illness       Patient is a 68-year-old female who came in for follow-up.       She has primary hypothyroidism and is on levothyroxine 88 mcg/day.  She has no history of goiter or head/neck radiation therapy.  She denies bowel changes.  She denies heat or cold intolerance.  TSH done in August 2023 is normal at 1.18.     She has vitamin D deficiency and is on vitamin D 2000 units daily.  25 hydroxyvitamin D done in August 2023 is normal at 41.4 ng/ML.     She has prediabetes since 2020.  She has history of gestational diabetes mellitus with one pregnancy.  Her mother and a brother have diabetes mellitus.  Her fasting glucose in December 2020 is elevated at 116 mg per DL.  She has nocturia 1-2 times a night.  She denies polyuria or polydipsia.       Hemoglobin A1c done in August 2022 is 6.1% with a normal fasting glucose at 95 mg per DL.  Hemoglobin A1c done in August 2023 is 6.2%.  She is on metformin  mg once a day.     She had an eye exam in November 2022 and was told to have glaucoma and cataracts.  She did not have diabetic retinopathy.    She has hyperlipidemia and is on diet alone.  Lipid panel done in August 2023 are as follows: Cholesterol 183.  HDL 53.  .  Triglycerides 106.  10-year risk of heart disease or stroke using American Heart Association calculator is 6.6%.     She went into her natural menopause at age 55.  She switched to continuous estradiol and medroxyprogesterone in 2022 and has been amenorrheic since.  She had a normal bone density done at [facility] in July 2021.   She follows with her gynecologist Dr. Perez.     She has sleep apnea and is not using CPAP regularly.  She wakes up rested most of the time.  She intermittently wakes herself up snoring.  She has not seen a sleep specialist for a while.     She has no history of hypertension, myocardial infarction or stroke.     She is retired  at  "[employer].    The following portions of the patient's history were reviewed and updated as appropriate: allergies, current medications, past family history, past medical history, past social history, past surgical history, and problem list.    Review of Systems   Eyes:  Positive for visual disturbance (When she drives at night.).   Respiratory:  Negative for shortness of breath.    Cardiovascular:  Negative for chest pain and palpitations.   Gastrointestinal: Negative.    Endocrine: Negative for polyuria.   Genitourinary: Negative.    Musculoskeletal:  Negative for myalgias.   Neurological:  Negative for numbness.   Vitals:    08/22/23 1021   BP: 108/80   Pulse: 79   Temp: 96.4 øF (35.8 øC)   TempSrc: Temporal   SpO2: 96%   Weight: 86.8 kg (191 lb 6.4 oz)   Height: 170.2 cm (67.01\")      Objective   Physical Exam  Constitutional:       General: She is not in acute distress.     Appearance: Normal appearance. She is not ill-appearing.   HENT:      Nose: Nose normal. No congestion or rhinorrhea.   Eyes:      General: No scleral icterus.        Right eye: No discharge.         Left eye: No discharge.      Extraocular Movements: Extraocular movements intact.      Conjunctiva/sclera: Conjunctivae normal.   Neck:      Vascular: No carotid bruit.   Cardiovascular:      Rate and Rhythm: Normal rate and regular rhythm.      Heart sounds: No murmur heard.    No friction rub. No gallop.   Pulmonary:      Effort: No respiratory distress.      Breath sounds: Normal breath sounds. No stridor. No rales.   Chest:      Chest wall: No tenderness.   Abdominal:      General: Bowel sounds are normal.      Palpations: Abdomen is soft.      Tenderness: There is no right CVA tenderness or left CVA tenderness.   Musculoskeletal:      Cervical back: Normal range of motion.      Right lower leg: No edema.      Left lower leg: No edema.   Lymphadenopathy:      Cervical: No cervical adenopathy.   Skin:     General: Skin is warm and dry. "   Neurological:      Mental Status: She is alert and oriented to person, place, and time.      Comments: Intact light touch on lower ext     Orders Only on 08/08/2023   Component Date Value Ref Range Status    Hemoglobin A1C 08/08/2023 6.20 (H)  4.80 - 5.60 % Final    Comment: Hemoglobin A1C Ranges:  Increased Risk for Diabetes  5.7% to 6.4%  Diabetes                     >= 6.5%  Diabetic Goal                < 7.0%      Total Cholesterol 08/08/2023 183  0 - 200 mg/dL Final    Comment: Cholesterol Reference Ranges  (U.S. Department of Health and Human Services ATP III  Classifications)  Desirable          <200 mg/dL  Borderline High    200-239 mg/dL  High Risk          >240 mg/dL  Triglyceride Reference Ranges  (U.S. Department of Health and Human Services ATP III  Classifications)  Normal           <150 mg/dL  Borderline High  150-199 mg/dL  High             200-499 mg/dL  Very High        >500 mg/dL  HDL Reference Ranges  (U.S. Department of Health and Human Services ATP III  Classifications)  Low     <40 mg/dl (major risk factor for CHD)  High    >60 mg/dl ('negative' risk factor for CHD)  LDL Reference Ranges  (U.S. Department of Health and Human Services ATP III  Classifications)  Optimal          <100 mg/dL  Near Optimal     100-129 mg/dL  Borderline High  130-159 mg/dL  High             160-189 mg/dL  Very High        >189 mg/dL      Triglycerides 08/08/2023 106  0 - 150 mg/dL Final    HDL Cholesterol 08/08/2023 53  40 - 60 mg/dL Final    VLDL Cholesterol [place] 08/08/2023 19  5 - 40 mg/dL Final    LDL Chol Calc (NIH) 08/08/2023 111 (H)  0 - 100 mg/dL Final    Glucose 08/08/2023 116 (H)  65 - 99 mg/dL Final    BUN 08/08/2023 16  8 - 23 mg/dL Final    Creatinine 08/08/2023 0.81  0.57 - 1.00 mg/dL Final    EGFR Result 08/08/2023 79.2  >60.0 mL/min/1.73 Final    Comment: GFR Normal >60  Chronic Kidney Disease <60  Kidney Failure <15      BUN/Creatinine Ratio 08/08/2023 19.8  7.0 - 25.0 Final    Sodium 08/08/2023 142   136 - 145 mmol/L Final    Potassium 08/08/2023 4.3  3.5 - 5.2 mmol/L Final    Chloride 08/08/2023 104  98 - 107 mmol/L Final    Total CO2 08/08/2023 27.7  22.0 - 29.0 mmol/L Final    Calcium 08/08/2023 10.1  8.6 - 10.5 mg/dL Final    Total Protein 08/08/2023 7.2  6.0 - 8.5 g/dL Final    Albumin 08/08/2023 4.4  3.5 - 5.2 g/dL Final    Globulin 08/08/2023 2.8  gm/dL Final    A/G Ratio 08/08/2023 1.6  g/dL Final    Total Bilirubin 08/08/2023 0.3  0.0 - 1.2 mg/dL Final    Alkaline Phosphatase 08/08/2023 76  39 - 117 U/L Final    AST (SGOT) 08/08/2023 16  1 - 32 U/L Final    ALT (SGPT) 08/08/2023 15  1 - 33 U/L Final    25 Hydroxy, Vitamin D 08/08/2023 41.4  30.0 - 100.0 ng/mL Final    Comment: Reference Range for Total Vitamin D 25(OH)  Deficiency <20.0 ng/mL  Insufficiency 21-29 ng/mL  Sufficiency  ng/mL  Toxicity >100 ng/ml      Free T4 08/08/2023 1.18  0.93 - 1.70 ng/dL Final    Results may be falsely increased if patient taking Biotin.    TSH 08/08/2023 1.180  0.270 - 4.200 uIU/mL Final    Interpretation 08/08/2023 Note   Final    Supplemental report is available.     Assessment & Plan   Diagnoses and all orders for this visit:    1. Primary hypothyroidism (Primary)  -     TSH; Future  -     T4, Free; Future    2. Vitamin D deficiency  -     Vitamin D,25-Hydroxy; Future    3. Prediabetes  -     Comprehensive Metabolic Panel; Future  -     Hemoglobin A1c; Future    4. Hyperlipidemia, unspecified hyperlipidemia type  -     Comprehensive Metabolic Panel; Future  -     Lipid Panel; Future    5. Obstructive sleep apnea syndrome  -     Comprehensive Metabolic Panel; Future    Other orders  -     levothyroxine (SYNTHROID, LEVOTHROID) 75 MCG tablet; 1 tablet daily  Dispense: 90 tablet; Refill: 2      Continue levothyroxine 88 mcg a day.  Continue vitamin D3 2000 units/day.  Continue metformin  mg/day.  Continue no concentrated sweet low-fat diet.  Follow-up with sleep specialist.  Flu vaccine this fall.    Copy  of my note sent to Luisa Lynn NP and Dr. Meadows.    RTC 6 mos.

## 2023-10-02 ENCOUNTER — OFFICE VISIT (OUTPATIENT)
Dept: FAMILY MEDICINE CLINIC | Facility: CLINIC | Age: 68
End: 2023-10-02
Payer: MEDICARE

## 2023-10-02 VITALS
OXYGEN SATURATION: 97 % | HEART RATE: 80 BPM | HEIGHT: 67 IN | SYSTOLIC BLOOD PRESSURE: 136 MMHG | DIASTOLIC BLOOD PRESSURE: 86 MMHG | WEIGHT: 189 LBS | TEMPERATURE: 97.8 F | BODY MASS INDEX: 29.66 KG/M2

## 2023-10-02 DIAGNOSIS — R31.9 HEMATURIA, UNSPECIFIED TYPE: ICD-10-CM

## 2023-10-02 DIAGNOSIS — R35.0 URINARY FREQUENCY: ICD-10-CM

## 2023-10-02 DIAGNOSIS — B37.31 VAGINAL CANDIDIASIS: Primary | ICD-10-CM

## 2023-10-02 PROCEDURE — 81003 URINALYSIS AUTO W/O SCOPE: CPT | Performed by: NURSE PRACTITIONER

## 2023-10-02 PROCEDURE — 99213 OFFICE O/P EST LOW 20 MIN: CPT | Performed by: NURSE PRACTITIONER

## 2023-10-02 PROCEDURE — 1160F RVW MEDS BY RX/DR IN RCRD: CPT | Performed by: NURSE PRACTITIONER

## 2023-10-02 PROCEDURE — 1159F MED LIST DOCD IN RCRD: CPT | Performed by: NURSE PRACTITIONER

## 2023-10-02 PROCEDURE — 3079F DIAST BP 80-89 MM HG: CPT | Performed by: NURSE PRACTITIONER

## 2023-10-02 PROCEDURE — 3044F HG A1C LEVEL LT 7.0%: CPT | Performed by: NURSE PRACTITIONER

## 2023-10-02 PROCEDURE — 3075F SYST BP GE 130 - 139MM HG: CPT | Performed by: NURSE PRACTITIONER

## 2023-10-02 RX ORDER — FLUCONAZOLE 150 MG/1
150 TABLET ORAL ONCE
Qty: 1 TABLET | Refills: 0 | Status: SHIPPED | OUTPATIENT
Start: 2023-10-02 | End: 2023-10-02

## 2023-10-02 NOTE — PROGRESS NOTES
"Chief Complaint  Hypertension (Follow up/) and Vaginal Discharge (Treated for BV by [facility], but no better//)    Subjective        Carly De presents to [facility]  History of Present Illness  Patient presents to the office today for follow-up on hypertension.  Blood pressures controlled at 136/86.  [facility] and [facility] with kflex.  She is still having vaginal discharge.  She denies any odor with discharge.  She completed Keflex.  Today she is reporting urinary frequency with vaginal itching.  I will follow-up and check a urinalysis culture if needed.          Objective   Vital Signs:  /86 (BP Location: Left arm, Patient Position: Sitting, Cuff Size: Large Adult)   Pulse 80   Temp 97.8 °F (36.6 °C)   Ht 170.2 cm (67.01\")   Wt 85.7 kg (189 lb)   SpO2 97%   BMI 29.59 kg/m²   Estimated body mass index is 29.59 kg/m² as calculated from the following:    Height as of this encounter: 170.2 cm (67.01\").    Weight as of this encounter: 85.7 kg (189 lb).               Physical Exam  Constitutional:       General: She is not in acute distress.     Appearance: Normal appearance.   HENT:      Head: Normocephalic.   Eyes:      Pupils: Pupils are equal, round, and reactive to light.   Cardiovascular:      Rate and Rhythm: Normal rate.      Pulses: Normal pulses.      Heart sounds: Normal heart sounds.   Pulmonary:      Effort: Pulmonary effort is normal.      Breath sounds: Normal breath sounds.   Musculoskeletal:         General: Normal range of motion.      Cervical back: Normal range of motion and neck supple.   Skin:     General: Skin is warm.   Neurological:      General: No focal deficit present.      Mental Status: She is alert and oriented to person, place, and time.   Psychiatric:         Mood and Affect: Mood normal.         Behavior: Behavior normal.         Thought Content: Thought content normal.         Judgment: Judgment normal.      "   Result Review :  The following data was reviewed by: JANUARY Christy on 10/02/2023:  Common labs          2/8/2023    09:26 4/14/2023    10:13 8/8/2023    08:28   Common Labs   Glucose 99  100  116    BUN 15  15  16    Creatinine 0.84  0.88  0.81    Sodium 138  139  142    Potassium 4.4  4.4  4.3    Chloride 103  102  104    Calcium 9.3  10.0  10.1    Total Protein 6.9  7.4  7.2    Albumin 4.3  4.9  4.4    Total Bilirubin 0.7  0.7  0.3    Alkaline Phosphatase 79  77  76    AST (SGOT) 18  19  16    ALT (SGPT) 9  10  15    Total Cholesterol 179   183    Triglycerides 96   106    HDL Cholesterol 52   53    LDL Cholesterol  109   111    Hemoglobin A1C 6.30   6.20                   Assessment and Plan   Diagnoses and all orders for this visit:    1. Vaginal candidiasis (Primary)  -     fluconazole (Diflucan) 150 MG tablet; Take 1 tablet by mouth 1 (One) Time for 1 dose.  Dispense: 1 tablet; Refill: 0    2. Urinary frequency  -     POCT urinalysis dipstick, automated  -     Urine Culture - Urine, Urine, Clean Catch    3. Hematuria, unspecified type  -     Urine Culture - Urine, Urine, Clean Catch    Side effects of all new and old medications reviewed with the patient -willing to accept all risks involved.  Advised to rto if no improvement or worsening of symptoms.  Patient instructed to  clinical summary at .     Completed cephalexin prescribed by [facility].   She reports cephalexin was given for yeast infection.     I spent 15 minutes caring for Carly on this date of service. This time includes time spent by me in the following activities:preparing for the visit, reviewing tests, obtaining and/or reviewing a separately obtained history, performing a medically appropriate examination and/or evaluation , counseling and educating the patient/family/caregiver, ordering medications, tests, or procedures, documenting information in the medical record, independently interpreting results and  communicating that information with the patient/family/caregiver, and care coordination  Follow Up   Return in about 4 weeks (around 10/30/2023) for Medicare Wellness.  Patient was given instructions and counseling regarding her condition or for health maintenance advice. Please see specific information pulled into the AVS if appropriate.

## 2023-10-04 LAB
BACTERIA UR CULT: NO GROWTH
BACTERIA UR CULT: NORMAL
BILIRUB BLD-MCNC: NEGATIVE MG/DL
CLARITY, POC: CLEAR
COLOR UR: YELLOW
EXPIRATION DATE: ABNORMAL
GLUCOSE UR STRIP-MCNC: NEGATIVE MG/DL
KETONES UR QL: NEGATIVE
LEUKOCYTE EST, POC: ABNORMAL
Lab: ABNORMAL
NITRITE UR-MCNC: NEGATIVE MG/ML
PH UR: 5 [PH] (ref 5–8)
PROT UR STRIP-MCNC: NEGATIVE MG/DL
RBC # UR STRIP: ABNORMAL /UL
SP GR UR: 1.03 (ref 1–1.03)
UROBILINOGEN UR QL: ABNORMAL

## 2023-11-22 ENCOUNTER — OFFICE VISIT (OUTPATIENT)
Dept: FAMILY MEDICINE CLINIC | Facility: CLINIC | Age: 68
End: 2023-11-22
Payer: MEDICARE

## 2023-11-22 VITALS
HEIGHT: 67 IN | OXYGEN SATURATION: 98 % | BODY MASS INDEX: 29.29 KG/M2 | DIASTOLIC BLOOD PRESSURE: 76 MMHG | WEIGHT: 186.6 LBS | SYSTOLIC BLOOD PRESSURE: 112 MMHG | TEMPERATURE: 98.2 F | HEART RATE: 96 BPM

## 2023-11-22 DIAGNOSIS — E03.9 PRIMARY HYPOTHYROIDISM: ICD-10-CM

## 2023-11-22 DIAGNOSIS — R73.03 PREDIABETES: ICD-10-CM

## 2023-11-22 DIAGNOSIS — I10 ESSENTIAL HYPERTENSION: ICD-10-CM

## 2023-11-22 DIAGNOSIS — Z23 IMMUNIZATION DUE: ICD-10-CM

## 2023-11-22 DIAGNOSIS — Z00.00 MEDICARE ANNUAL WELLNESS VISIT, SUBSEQUENT: Primary | ICD-10-CM

## 2023-11-22 NOTE — PROGRESS NOTES
The ABCs of the Annual Wellness Visit  Subsequent Medicare Wellness Visit    Subjective      Carly De is a 68 y.o. female who presents for a Subsequent Medicare Wellness Visit.    The following portions of the patient's history were reviewed and   updated as appropriate: allergies, current medications, past family history, past medical history, past social history, past surgical history, and problem list.    Compared to one year ago, the patient feels her physical   health is the same.    Compared to one year ago, the patient feels her mental   health is the same.    Recent Hospitalizations:  She was not admitted to the hospital during the last year.       Current Medical Providers:  Patient Care Team:  Luisa Lynn APRN as PCP - General (Nurse Practitioner)  Saul Gillespie MD as Consulting Physician (Endocrinology)  Minh Del Rosario MD as Consulting Physician (Gastroenterology)  Demario Nicolas MD as Consulting Physician (Obstetrics and Gynecology)  Rene Patrick MD as Consulting Physician (General Surgery)    Outpatient Medications Prior to Visit   Medication Sig Dispense Refill    aspirin-acetaminophen-caffeine (EXCEDRIN MIGRAINE) 250-250-65 MG per tablet Take 1 tablet by mouth Every 6 (Six) Hours As Needed for Headache.      baclofen (LIORESAL) 10 MG tablet Take 1 tablet by mouth 3 (Three) Times a Day. 30 tablet 0    Cholecalciferol (VITAMIN D) 2000 units tablet Take 1 tablet by mouth Daily.      dicyclomine (BENTYL) 20 MG tablet TAKE ONE TABLET BY MOUTH FOUR TIMES A DAY BEFORE MEALS AND AT BEDTIME AS NEEDED FOR CRAMPING 90 tablet 2    estradiol (ESTRACE) 1 MG tablet Take 1 tablet by mouth Daily.      levothyroxine (SYNTHROID, LEVOTHROID) 75 MCG tablet 1 tablet daily 90 tablet 2    medroxyPROGESTERone (PROVERA) 2.5 MG tablet       metFORMIN ER (GLUCOPHAGE-XR) 500 MG 24 hr tablet 1 tablet daily with supper. 90 tablet 2    montelukast (Singulair) 10 MG tablet Take 1  "tablet by mouth Every Night. 90 tablet 1    omeprazole (priLOSEC) 40 MG capsule Take 1 capsule by mouth Daily. for GERD 90 capsule 1    valACYclovir (VALTREX) 1000 MG tablet Take 1 tablet by mouth 2 (Two) Times a Day. 30 tablet 2    meloxicam (Mobic) 7.5 MG tablet Take 1 tablet by mouth Daily. 30 tablet 2     No facility-administered medications prior to visit.       No opioid medication identified on active medication list. I have reviewed chart for other potential  high risk medication/s and harmful drug interactions in the elderly.        Aspirin is not on active medication list.  Aspirin use is not indicated based on review of current medical condition/s. Risk of harm outweighs potential benefits.  .    Patient Active Problem List   Diagnosis    Abnormal weight gain    Lymphocytic thyroiditis    Hoarseness    Primary hypothyroidism    Sleep apnea    Vitamin D deficiency    Medicare annual wellness visit, subsequent    Elevated fasting glucose    Diverticulitis    Family history of diabetes mellitus (DM)    History of gestational diabetes mellitus (GDM)    Prediabetes    Essential hypertension    Dysphagia    Menopause syndrome    Left hip pain    Acute non-recurrent frontal sinusitis    Anxiety and depression    Bilateral impacted cerumen    Forgetfulness    Cervical pain    Vaginal candidiasis    Urinary frequency    Hematuria    Immunization due     Advance Care Planning   Advance Care Planning     Advance Directive is not on file.  ACP discussion was held with the patient during this visit. Patient has an advance directive (not in EMR), copy requested.     Objective    Vitals:    11/22/23 1458   BP: 112/76   BP Location: Left arm   Patient Position: Sitting   Cuff Size: Large Adult   Pulse: 96   Temp: 98.2 °F (36.8 °C)   SpO2: 98%   Weight: 84.6 kg (186 lb 9.6 oz)   Height: 170.2 cm (67.01\")     Estimated body mass index is 29.22 kg/m² as calculated from the following:    Height as of this encounter: 170.2 cm " "(67.01\").    Weight as of this encounter: 84.6 kg (186 lb 9.6 oz).           Does the patient have evidence of cognitive impairment?   No            HEALTH RISK ASSESSMENT    Smoking Status:  Social History     Tobacco Use   Smoking Status Former    Packs/day: 0.50    Years: 5.00    Additional pack years: 0.00    Total pack years: 2.50    Types: Cigarettes    Quit date: 1990    Years since quittin.9   Smokeless Tobacco Never     Alcohol Consumption:  Social History     Substance and Sexual Activity   Alcohol Use Yes    Comment: Occasionally     Fall Risk Screen:    LIDIA Fall Risk Assessment was completed, and patient is at LOW risk for falls.Assessment completed on:2023    Depression Screenin/22/2023     3:02 PM   PHQ-2/PHQ-9 Depression Screening   Little Interest or Pleasure in Doing Things 0-->not at all   Feeling Down, Depressed or Hopeless 0-->not at all   PHQ-9: Brief Depression Severity Measure Score 0       Health Habits and Functional and Cognitive Screenin/22/2023     3:01 PM   Functional & Cognitive Status   Do you have difficulty preparing food and eating? No   Do you have difficulty bathing yourself, getting dressed or grooming yourself? No   Do you have difficulty using the toilet? No   Do you have difficulty moving around from place to place? No   Do you have trouble with steps or getting out of a bed or a chair? No   Current Diet Limited Junk Food   Dental Exam Up to date   Eye Exam Up to date   Exercise (times per week) 4 times per week        Exercise Comment bertha   Do you need help using the phone?  No   Are you deaf or do you have serious difficulty hearing?  No   Do you need help to go to places out of walking distance? No   Do you need help shopping? No   Do you need help preparing meals?  No   Do you need help with housework?  No   Do you need help with laundry? No   Do you need help taking your medications? No   Do you need help managing money? No   Do you " ever drive or ride in a car without wearing a seat belt? No   Have you felt unusual stress, anger or loneliness in the last month? No   Who do you live with? Child   If you need help, do you have trouble finding someone available to you? No   Have you been bothered in the last four weeks by sexual problems? No   Do you have difficulty concentrating, remembering or making decisions? No       Age-appropriate Screening Schedule:  Refer to the list below for future screening recommendations based on patient's age, sex and/or medical conditions. Orders for these recommended tests are listed in the plan section. The patient has been provided with a written plan.    Health Maintenance   Topic Date Due    URINE MICROALBUMIN  Never done    TDAP/TD VACCINES (1 - Tdap) Never done    ZOSTER VACCINE (1 of 2) Never done    DIABETIC FOOT EXAM  Never done    DXA SCAN  07/08/2023    COVID-19 Vaccine (5 - 2023-24 season) 09/01/2023    ANNUAL WELLNESS VISIT  10/17/2023    PAP SMEAR  01/14/2024 (Originally 11/6/2022)    HEMOGLOBIN A1C  02/08/2024    BMI FOLLOWUP  04/14/2024    DIABETIC EYE EXAM  05/25/2024    LIPID PANEL  08/08/2024    MAMMOGRAM  04/26/2025    COLORECTAL CANCER SCREENING  08/06/2025    HEPATITIS C SCREENING  Completed    INFLUENZA VACCINE  Completed    Pneumococcal Vaccine 65+  Completed                  CMS Preventative Services Quick Reference  Risk Factors Identified During Encounter:    Immunizations Discussed/Encouraged: Influenza, Prevnar 20 (Pneumococcal 20-valent conjugate), COVID19, and RSV (Respiratory Syncytial Virus)  Dental Screening Recommended  Vision Screening Recommended    The above risks/problems have been discussed with the patient.  Pertinent information has been shared with the patient in the After Visit Summary.    Diagnoses and all orders for this visit:    1. Medicare annual wellness visit, subsequent (Primary)    2. Immunization due  -     Fluzone High-Dose 65+yrs ([id number])  -      Pneumococcal Conjugate Vaccine 20-Valent (PCV20)    3. Essential hypertension  Assessment & Plan:  Hypertension is Continue current treatment regimen.  Dietary sodium restriction.  Weight loss.  Regular aerobic exercise.  Stop smoking.  Blood pressure will be reassessed at the next regular appointment.      4. Primary hypothyroidism  Assessment & Plan:  Stable takes medication regularly follows endocrinology.      5. Prediabetes      Counseling was provided on nutrition, physical activity, development, and injury prevention, dental health, and safe sex practices patient verbalizes understanding no additional questions were asked.    Prediabetes stable working on healthy diet and exercise.    Follow Up:   Next Medicare Wellness visit to be scheduled in 1 year.      An After Visit Summary and PPPS were made available to the patient.

## 2023-11-23 DIAGNOSIS — M25.552 LEFT HIP PAIN: ICD-10-CM

## 2023-11-27 RX ORDER — MELOXICAM 7.5 MG/1
7.5 TABLET ORAL DAILY
Qty: 30 TABLET | Refills: 2 | Status: SHIPPED | OUTPATIENT
Start: 2023-11-27

## 2023-12-01 NOTE — ASSESSMENT & PLAN NOTE
Hypertension is Continue current treatment regimen.  Dietary sodium restriction.  Weight loss.  Regular aerobic exercise.  Stop smoking.  Blood pressure will be reassessed at the next regular appointment.

## 2023-12-21 RX ORDER — METFORMIN HYDROCHLORIDE 500 MG/1
TABLET, EXTENDED RELEASE ORAL
Qty: 90 TABLET | Refills: 2 | Status: SHIPPED | OUTPATIENT
Start: 2023-12-21

## 2023-12-21 NOTE — TELEPHONE ENCOUNTER
Rx Refill Note  Requested Prescriptions     Pending Prescriptions Disp Refills    metFORMIN ER (GLUCOPHAGE-XR) 500 MG 24 hr tablet [Pharmacy Med Name: METFORMIN HCL ER TABS 500MG] 90 tablet 3     Sig: TAKE 1 TABLET DAILY WITH SUPPER      Last office visit with prescribing clinician: 8/22/2023   Last telemedicine visit with prescribing clinician: Visit date not found   Next office visit with prescribing clinician: 3/6/2024                         Would you like a call back once the refill request has been completed: [] Yes [] No    If the office needs to give you a call back, can they leave a voicemail: [] Yes [] No    Jessy Siegel MA  12/21/23, 07:51 EST

## 2024-01-22 RX ORDER — VALACYCLOVIR HYDROCHLORIDE 1 G/1
1000 TABLET, FILM COATED ORAL 2 TIMES DAILY
Qty: 30 TABLET | Refills: 2 | Status: SHIPPED | OUTPATIENT
Start: 2024-01-22

## 2024-01-22 NOTE — TELEPHONE ENCOUNTER
Caller: Carly De    Relationship: Self    Best call back number:     [phone]       Requested Prescriptions:   Requested Prescriptions     Pending Prescriptions Disp Refills    valACYclovir (VALTREX) 1000 MG tablet 30 tablet 2     Sig: Take 1 tablet by mouth 2 (Two) Times a Day.        [id number] [place], [place] - [address] AT [facility] [phone] [facility] [phone]      Last office visit with prescribing clinician: 11/22/2023   Last telemedicine visit with prescribing clinician: Visit date not found   Next office visit with prescribing clinician: 5/22/2024     Additional details provided by patient:     OUT OF MEDICATION    Does the patient have less than a 3 day supply:  [x] Yes  [] No    Would you like a call back once the refill request has been completed: [] Yes [] No    If the office needs to give you a call back, can they leave a voicemail: [] Yes [] No    Faith Fonseca Rep   01/22/24 14:40 EST

## 2024-01-29 ENCOUNTER — OFFICE VISIT (OUTPATIENT)
Dept: NEUROLOGY | Facility: CLINIC | Age: 69
End: 2024-01-29
Payer: MEDICARE

## 2024-01-29 VITALS
BODY MASS INDEX: 29.03 KG/M2 | OXYGEN SATURATION: 98 % | WEIGHT: 185 LBS | DIASTOLIC BLOOD PRESSURE: 80 MMHG | SYSTOLIC BLOOD PRESSURE: 132 MMHG | HEIGHT: 67 IN | HEART RATE: 82 BPM

## 2024-01-29 DIAGNOSIS — R41.89 SUBJECTIVE COGNITIVE IMPAIRMENT: Primary | ICD-10-CM

## 2024-01-29 PROCEDURE — 3075F SYST BP GE 130 - 139MM HG: CPT | Performed by: STUDENT IN AN ORGANIZED HEALTH CARE EDUCATION/TRAINING PROGRAM

## 2024-01-29 PROCEDURE — 3079F DIAST BP 80-89 MM HG: CPT | Performed by: STUDENT IN AN ORGANIZED HEALTH CARE EDUCATION/TRAINING PROGRAM

## 2024-01-29 PROCEDURE — 99213 OFFICE O/P EST LOW 20 MIN: CPT | Performed by: STUDENT IN AN ORGANIZED HEALTH CARE EDUCATION/TRAINING PROGRAM

## 2024-01-29 PROCEDURE — 1159F MED LIST DOCD IN RCRD: CPT | Performed by: STUDENT IN AN ORGANIZED HEALTH CARE EDUCATION/TRAINING PROGRAM

## 2024-01-29 PROCEDURE — 1160F RVW MEDS BY RX/DR IN RCRD: CPT | Performed by: STUDENT IN AN ORGANIZED HEALTH CARE EDUCATION/TRAINING PROGRAM

## 2024-01-29 NOTE — LETTER
January 29, 2024       No Recipients    Patient: Carly De   YOB: 1955   Date of Visit: 1/29/2024     Dear JANUARY Christy:       Thank you for referring Carly De to me for evaluation. Below are the relevant portions of my assessment and plan of care.    If you have questions, please do not hesitate to call me. I look forward to following Carly along with you.         Sincerely,        Franky Garcia MD        CC:   No Recipients

## 2024-01-29 NOTE — PROGRESS NOTES
Chief Complaint   Patient presents with    subjective cognitive impairment       Patient ID: Carly De is a 69 y.o. female.    HPI:    The following portions of the patient's history were reviewed and updated as appropriate: allergies, current medications, past family history, past medical history, past social history, past surgical history and problem list.    Interval history:  Ms. Carly De is a 69-year-old female who presents for follow-up of subjective cognitive impairment. We checked her B12 level, which was normal at her last appointment. She feels like things are similar today as they have been in the past.    Today, the patient reports that she has been doing well. Her grandchildren have not mentioned that she has been more forgetful recently. She notes they are the ones that noticed her confusion initially. She mentions that she still forgets where the remote is, where her keys are, or glasses are. She remains fully independent, manages her own finances and medications. She denies difficulty dressing herself, with personal hygiene, household chores, and remembers to eat meals. She still drives, and occasionally takes care of her grandchildren. Her son does the yard work.    Her blood pressure is known to fluctuate. Her recent LDL was mildly elevated. Her blood glucose is in the prediabetic range. She is taking metformin managed by Dr. Sue.    She has a family history of dementia.    Review of Systems   Neurological:  Negative for dizziness, tremors, seizures, syncope, facial asymmetry, speech difficulty, weakness, light-headedness, numbness and headaches.   Psychiatric/Behavioral:  Positive for confusion and decreased concentration.         Vitals:    01/29/24 1000   BP: 132/80   Pulse: 82   SpO2: 98%       Neurologic Exam     Mental Status   Level of consciousness: alert  MMSE 29/30 - incorrect but only 3 sided intersection. Abnormal clock draw, maricruz a 1 between 11 and 12 but hands pointed  to right numbers and other numbers were there including 1 in right positions.       Physical Exam    Procedures    Assessment/Plan:         Diagnoses and all orders for this visit:    1. Subjective cognitive impairment (Primary)         1. Subjective cognitive impairment.  She has memory impairment that is subjective in nature. Her testing has been normal range and MRI is fairly reassuring, though clock draw was mildly abnormal today. We discussed various strategies including controlling her blood pressure, cholesterol and glucose levels in addition to regular exercise, socialization, performing mental activities, and sleeping well to help maintain her brain health.     I spent a total of 24 minutes in the care of this patient today.     She will follow up with me in 1 year.      Transcribed from ambient dictation for Frakny Garcia MD by Tavia Tineo.  01/29/24   11:23 EST    Patient or patient representative verbalized consent to the visit recording.  I have personally performed the services described in this document as transcribed by the above individual, and it is both accurate and complete.  Franky Garcia MD  1/29/2024  12:07 EST

## 2024-02-21 DIAGNOSIS — E78.5 HYPERLIPIDEMIA, UNSPECIFIED HYPERLIPIDEMIA TYPE: ICD-10-CM

## 2024-02-21 DIAGNOSIS — R73.03 PREDIABETES: ICD-10-CM

## 2024-02-21 DIAGNOSIS — E55.9 VITAMIN D DEFICIENCY: ICD-10-CM

## 2024-02-21 DIAGNOSIS — G47.33 OBSTRUCTIVE SLEEP APNEA SYNDROME: ICD-10-CM

## 2024-02-21 DIAGNOSIS — E03.9 PRIMARY HYPOTHYROIDISM: ICD-10-CM

## 2024-02-21 LAB
25(OH)D3+25(OH)D2 SERPL-MCNC: 51.8 NG/ML (ref 30–100)
ALBUMIN SERPL-MCNC: 4.2 G/DL (ref 3.5–5.2)
ALBUMIN/GLOB SERPL: 1.7 G/DL
ALP SERPL-CCNC: 70 U/L (ref 39–117)
ALT SERPL-CCNC: 7 U/L (ref 1–33)
AST SERPL-CCNC: 14 U/L (ref 1–32)
BILIRUB SERPL-MCNC: 0.5 MG/DL (ref 0–1.2)
BUN SERPL-MCNC: 15 MG/DL (ref 8–23)
BUN/CREAT SERPL: 17.6 (ref 7–25)
CALCIUM SERPL-MCNC: 9.3 MG/DL (ref 8.6–10.5)
CHLORIDE SERPL-SCNC: 103 MMOL/L (ref 98–107)
CHOLEST SERPL-MCNC: 169 MG/DL (ref 0–200)
CO2 SERPL-SCNC: 23.1 MMOL/L (ref 22–29)
CREAT SERPL-MCNC: 0.85 MG/DL (ref 0.57–1)
EGFRCR SERPLBLD CKD-EPI 2021: 74.3 ML/MIN/1.73
GLOBULIN SER CALC-MCNC: 2.5 GM/DL
GLUCOSE SERPL-MCNC: 93 MG/DL (ref 65–99)
HBA1C MFR BLD: 6.2 % (ref 4.8–5.6)
HDLC SERPL-MCNC: 48 MG/DL (ref 40–60)
IMP & REVIEW OF LAB RESULTS: NORMAL
LDLC SERPL CALC-MCNC: 104 MG/DL (ref 0–100)
POTASSIUM SERPL-SCNC: 4.2 MMOL/L (ref 3.5–5.2)
PROT SERPL-MCNC: 6.7 G/DL (ref 6–8.5)
SODIUM SERPL-SCNC: 139 MMOL/L (ref 136–145)
T4 FREE SERPL-MCNC: 1.12 NG/DL (ref 0.93–1.7)
TRIGL SERPL-MCNC: 90 MG/DL (ref 0–150)
TSH SERPL DL<=0.005 MIU/L-ACNC: 1.31 UIU/ML (ref 0.27–4.2)
VLDLC SERPL CALC-MCNC: 17 MG/DL (ref 5–40)

## 2024-02-28 RX ORDER — LEVOTHYROXINE SODIUM 0.07 MG/1
TABLET ORAL
Qty: 90 TABLET | Refills: 2 | Status: SHIPPED | OUTPATIENT
Start: 2024-02-28

## 2024-02-28 NOTE — TELEPHONE ENCOUNTER
Rx Refill Note  Requested Prescriptions     Pending Prescriptions Disp Refills    levothyroxine (SYNTHROID, LEVOTHROID) 75 MCG tablet [Pharmacy Med Name: LEVOTHYROXINE 0.075MG (75MCG) TABS] 90 tablet 2     Sig: TAKE 1 TABLET BY MOUTH EVERY DAY      Last office visit with prescribing clinician: 8/22/2023   Last telemedicine visit with prescribing clinician: Visit date not found   Next office visit with prescribing clinician: 3/6/2024                         Would you like a call back once the refill request has been completed: [] Yes [] No    If the office needs to give you a call back, can they leave a voicemail: [] Yes [] No    Jessy Siegel MA  02/28/24, 07:53 EST

## 2024-03-05 ENCOUNTER — TELEPHONE (OUTPATIENT)
Dept: GASTROENTEROLOGY | Facility: CLINIC | Age: 69
End: 2024-03-05
Payer: MEDICARE

## 2024-03-05 NOTE — TELEPHONE ENCOUNTER
Ok for hub to read and r/s with nael.   Called pt to r/s 3/8 appt with nael due to nael being out of the office. No answer, left vm.

## 2024-03-06 ENCOUNTER — OFFICE VISIT (OUTPATIENT)
Dept: ENDOCRINOLOGY | Age: 69
End: 2024-03-06
Payer: MEDICARE

## 2024-03-06 VITALS
DIASTOLIC BLOOD PRESSURE: 82 MMHG | HEART RATE: 79 BPM | SYSTOLIC BLOOD PRESSURE: 118 MMHG | WEIGHT: 184.6 LBS | TEMPERATURE: 97.7 F | OXYGEN SATURATION: 97 % | BODY MASS INDEX: 28.97 KG/M2 | HEIGHT: 67 IN

## 2024-03-06 DIAGNOSIS — E03.9 PRIMARY HYPOTHYROIDISM: Primary | ICD-10-CM

## 2024-03-06 DIAGNOSIS — E78.5 HYPERLIPIDEMIA, UNSPECIFIED HYPERLIPIDEMIA TYPE: ICD-10-CM

## 2024-03-06 DIAGNOSIS — R73.03 PREDIABETES: ICD-10-CM

## 2024-03-06 DIAGNOSIS — E55.9 VITAMIN D DEFICIENCY: ICD-10-CM

## 2024-03-06 PROCEDURE — 99214 OFFICE O/P EST MOD 30 MIN: CPT | Performed by: INTERNAL MEDICINE

## 2024-03-06 PROCEDURE — 3044F HG A1C LEVEL LT 7.0%: CPT | Performed by: INTERNAL MEDICINE

## 2024-03-06 PROCEDURE — 3074F SYST BP LT 130 MM HG: CPT | Performed by: INTERNAL MEDICINE

## 2024-03-06 PROCEDURE — 3079F DIAST BP 80-89 MM HG: CPT | Performed by: INTERNAL MEDICINE

## 2024-03-06 RX ORDER — ROSUVASTATIN CALCIUM 5 MG/1
5 TABLET, COATED ORAL NIGHTLY
Qty: 90 TABLET | Refills: 2 | Status: SHIPPED | OUTPATIENT
Start: 2024-03-06 | End: 2025-03-06

## 2024-03-06 NOTE — PROGRESS NOTES
Subjective   Carly De is a 69 y.o. female.     History of Present Illness       She has primary hypothyroidism and is on levothyroxine 75 mcg/day.  She has no history of goiter or head/neck radiation therapy.  She denies bowel changes.  She denies heat or cold intolerance.  TSH done in February 2024 is normal at 1.31.     She has vitamin D deficiency and is on vitamin D 2000 units daily.  25 hydroxyvitamin D done in February 2024 is normal at 51.8 ng or ML.     She has prediabetes since 2020.  She has history of gestational diabetes mellitus with one pregnancy.  Her mother and a brother have diabetes mellitus.  Her fasting glucose in December 2020 is elevated at 116 mg per DL.  She has nocturia 1-2 times a night.  She denies polyuria or polydipsia.       Hemoglobin A1c done in August 2022 is 6.1% with a normal fasting glucose at 95 mg per DL.  Hemoglobin A1c done in February 2024 is elevated at 6.2%.  She is on metformin  mg once a day.     She had an eye exam in 11/23 and was told to have glaucoma and cataracts.  She did not have diabetic retinopathy.     She has hyperlipidemia and is on diet alone.  She has lost 7 pounds since August 2023.  Lipid panel done in February 2024 as follows: Cholesterol 169.  HDL 48.  .  Triglycerides 90.   Her 10-year risk of heart disease or stroke using American Heart Association calculator is elevated at 7.9%.    She went into her natural menopause at age 55.  She switched to continuous estradiol and medroxyprogesterone in 2022 and has been amenorrheic since.  She had a normal bone density done at [facility] in July 2021.   She follows with her gynecologist Dr. Perez.     She has sleep apnea and is not using CPAP regularly.  She wakes up rested most of the time.  She denies waking herself up snoring.  She has not seen a sleep specialist for a while.     She has no history of hypertension, myocardial infarction or stroke.     She is retired  at  "[employer].    The following portions of the patient's history were reviewed and updated as appropriate: allergies, current medications, past family history, past medical history, past social history, past surgical history, and problem list.    Review of Systems   Eyes:  Negative for visual disturbance.   Respiratory:  Negative for shortness of breath and wheezing.    Cardiovascular:  Negative for chest pain and palpitations.   Gastrointestinal: Negative.    Genitourinary: Negative.    Musculoskeletal:  Negative for myalgias.   Neurological:  Negative for numbness.     Vitals:    03/06/24 1059   BP: 118/82   Pulse: 79   Temp: 97.7 °F (36.5 °C)   TempSrc: Temporal   SpO2: 97%   Weight: 83.7 kg (184 lb 9.6 oz)   Height: 170.2 cm (67.01\")      Objective   Physical Exam  Constitutional:       General: She is not in acute distress.     Appearance: Normal appearance. She is not ill-appearing, toxic-appearing or diaphoretic.   Eyes:      General: No scleral icterus.        Right eye: No discharge.         Left eye: No discharge.      Extraocular Movements: Extraocular movements intact.   Cardiovascular:      Rate and Rhythm: Normal rate and regular rhythm.      Heart sounds: No murmur heard.  Pulmonary:      Breath sounds: Normal breath sounds. No rales.   Chest:      Chest wall: No tenderness.   Abdominal:      Palpations: Abdomen is soft.      Tenderness: There is no right CVA tenderness or left CVA tenderness.   Musculoskeletal:      Right lower leg: No edema.      Left lower leg: No edema.   Neurological:      Mental Status: She is alert and oriented to person, place, and time.       Orders Only on 02/21/2024   Component Date Value Ref Range Status    25 Hydroxy, Vitamin D 02/21/2024 51.8  30.0 - 100.0 ng/mL Final    Comment: Reference Range for Total Vitamin D 25(OH)  Deficiency <20.0 ng/mL  Insufficiency 21-29 ng/mL  Sufficiency  ng/mL  Toxicity >100 ng/ml      Hemoglobin A1C 02/21/2024 6.20 (H)  4.80 - 5.60 % Final "    Comment: Hemoglobin A1C Ranges:  Increased Risk for Diabetes  5.7% to 6.4%  Diabetes                     >= 6.5%  Diabetic Goal                < 7.0%      Total Cholesterol 02/21/2024 169  0 - 200 mg/dL Final    Comment: Cholesterol Reference Ranges  (U.S. Department of Health and Human Services ATP III  Classifications)  Desirable          <200 mg/dL  Borderline High    200-239 mg/dL  High Risk          >240 mg/dL  Triglyceride Reference Ranges  (U.S. Department of Health and Human Services ATP III  Classifications)  Normal           <150 mg/dL  Borderline High  150-199 mg/dL  High             200-499 mg/dL  Very High        >500 mg/dL  HDL Reference Ranges  (U.S. Department of Health and Human Services ATP III  Classifications)  Low     <40 mg/dl (major risk factor for CHD)  High    >60 mg/dl ('negative' risk factor for CHD)  LDL Reference Ranges  (U.S. Department of Health and Human Services ATP III  Classifications)  Optimal          <100 mg/dL  Near Optimal     100-129 mg/dL  Borderline High  130-159 mg/dL  High             160-189 mg/dL  Very High        >189 mg/dL      Triglycerides 02/21/2024 90  0 - 150 mg/dL Final    HDL Cholesterol 02/21/2024 48  40 - 60 mg/dL Final    VLDL Cholesterol [place] 02/21/2024 17  5 - 40 mg/dL Final    LDL Chol Calc (NIH) 02/21/2024 104 (H)  0 - 100 mg/dL Final    Glucose 02/21/2024 93  65 - 99 mg/dL Final    BUN 02/21/2024 15  8 - 23 mg/dL Final    Creatinine 02/21/2024 0.85  0.57 - 1.00 mg/dL Final    EGFR Result 02/21/2024 74.3  >60.0 mL/min/1.73 Final    Comment: GFR Normal >60  Chronic Kidney Disease <60  Kidney Failure <15      BUN/Creatinine Ratio 02/21/2024 17.6  7.0 - 25.0 Final    Sodium 02/21/2024 139  136 - 145 mmol/L Final    Potassium 02/21/2024 4.2  3.5 - 5.2 mmol/L Final    Chloride 02/21/2024 103  98 - 107 mmol/L Final    Total CO2 02/21/2024 23.1  22.0 - 29.0 mmol/L Final    Calcium 02/21/2024 9.3  8.6 - 10.5 mg/dL Final    Total Protein 02/21/2024 6.7  6.0 -  8.5 g/dL Final    Albumin 02/21/2024 4.2  3.5 - 5.2 g/dL Final    Globulin 02/21/2024 2.5  gm/dL Final    A/G Ratio 02/21/2024 1.7  g/dL Final    Total Bilirubin 02/21/2024 0.5  0.0 - 1.2 mg/dL Final    Alkaline Phosphatase 02/21/2024 70  39 - 117 U/L Final    AST (SGOT) 02/21/2024 14  1 - 32 U/L Final    ALT (SGPT) 02/21/2024 7  1 - 33 U/L Final    Free T4 02/21/2024 1.12  0.93 - 1.70 ng/dL Final    Results may be falsely increased if patient taking Biotin.    TSH 02/21/2024 1.310  0.270 - 4.200 uIU/mL Final    Interpretation 02/21/2024 Note   Final    Supplemental report is available.     Assessment & Plan   Diagnoses and all orders for this visit:    1. Primary hypothyroidism (Primary)    2. Vitamin D deficiency    3. Prediabetes    4. Hyperlipidemia, unspecified hyperlipidemia type  -     Comprehensive Metabolic Panel; Future  -     Lipid Panel; Future    Other orders  -     rosuvastatin (Crestor) 5 MG tablet; Take 1 tablet by mouth Every Night.  Dispense: 90 tablet; Refill: 2      Continue levothyroxine 75 mcg a day.    Continue vitamin D3 2000 units/day.    Continue no concentrated sweet low-fat diet.  Continue metformin  mg/day.  Start rosuvastatin 5 mg every evening.  Repeat CMP and lipid panel in 6 weeks.    Copy of my note sent to Luisa Lynn NP    RTC 6 mos.

## 2024-03-28 ENCOUNTER — OFFICE VISIT (OUTPATIENT)
Dept: FAMILY MEDICINE CLINIC | Facility: CLINIC | Age: 69
End: 2024-03-28
Payer: MEDICARE

## 2024-03-28 VITALS
BODY MASS INDEX: 28.91 KG/M2 | RESPIRATION RATE: 16 BRPM | OXYGEN SATURATION: 96 % | DIASTOLIC BLOOD PRESSURE: 80 MMHG | TEMPERATURE: 97.1 F | WEIGHT: 184.2 LBS | HEIGHT: 67 IN | SYSTOLIC BLOOD PRESSURE: 140 MMHG | HEART RATE: 94 BPM

## 2024-03-28 DIAGNOSIS — I10 ESSENTIAL HYPERTENSION: ICD-10-CM

## 2024-03-28 DIAGNOSIS — M25.562 CHRONIC PAIN OF LEFT KNEE: Primary | ICD-10-CM

## 2024-03-28 DIAGNOSIS — G89.29 CHRONIC PAIN OF LEFT KNEE: Primary | ICD-10-CM

## 2024-03-28 PROCEDURE — 3044F HG A1C LEVEL LT 7.0%: CPT

## 2024-03-28 PROCEDURE — 99214 OFFICE O/P EST MOD 30 MIN: CPT

## 2024-03-28 PROCEDURE — 1160F RVW MEDS BY RX/DR IN RCRD: CPT

## 2024-03-28 PROCEDURE — 3079F DIAST BP 80-89 MM HG: CPT

## 2024-03-28 PROCEDURE — 1159F MED LIST DOCD IN RCRD: CPT

## 2024-03-28 PROCEDURE — 3077F SYST BP >= 140 MM HG: CPT

## 2024-03-28 RX ORDER — MELOXICAM 7.5 MG/1
7.5 TABLET ORAL DAILY
Qty: 30 TABLET | Refills: 2 | Status: SHIPPED | OUTPATIENT
Start: 2024-03-28

## 2024-03-28 NOTE — ASSESSMENT & PLAN NOTE
Hypertension is stable and controlled based on home readings.   Dietary sodium restriction.  Weight loss.  Regular aerobic exercise.  Ambulatory blood pressure monitoring.  Blood pressure will be reassessed  at next scheduled follow up .

## 2024-03-28 NOTE — PROGRESS NOTES
"Chief Complaint  Knee Pain (Left Knee pain started in Jan then stopped started back 2 weeks ago)    Subjective        Carly De presents to [facility]  History of Present Illness  She is new to me but a patient of JANUARY Christy.  She was last seen by her on 11/22/2023. She states her left knee pain started in 2 months ago with no known injury. She was seen at urgent care at the time and had a normal left knee xray on 1/24/2024. She states the pain has been off and on and she has been taking over the counter ibuprofen.  She has been prescribed meloxicam in the past but has not been taking it.    With hypertension, blood pressure today is 140/80. Checks at home and usually around 125/80. Denies headache, blurred vision, chest pain, shortness of air, or peripheral edema.       Objective   Vital Signs:  /80 (BP Location: Left arm, Patient Position: Sitting, Cuff Size: Adult)   Pulse 94   Temp 97.1 °F (36.2 °C) (Temporal)   Resp 16   Ht 170.2 cm (67.01\")   Wt 83.6 kg (184 lb 3.2 oz)   SpO2 96%   BMI 28.84 kg/m²   Estimated body mass index is 28.84 kg/m² as calculated from the following:    Height as of this encounter: 170.2 cm (67.01\").    Weight as of this encounter: 83.6 kg (184 lb 3.2 oz).               Physical Exam  Constitutional:       Appearance: Normal appearance.   Cardiovascular:      Rate and Rhythm: Normal rate and regular rhythm.      Heart sounds: Normal heart sounds.   Pulmonary:      Effort: Pulmonary effort is normal.      Breath sounds: Normal breath sounds.   Musculoskeletal:      Left knee: Swelling present. Tenderness present.   Neurological:      Mental Status: She is alert.   Psychiatric:         Mood and Affect: Mood normal.        Result Review :    The following data was reviewed by: JANUARY Tanner on 03/28/2024:  Common labs          4/14/2023    10:13 8/8/2023    08:28 2/21/2024    09:41   Common Labs   Glucose 100  116  93  "   BUN 15  16  15    Creatinine 0.88  0.81  0.85    Sodium 139  142  139    Potassium 4.4  4.3  4.2    Chloride 102  104  103    Calcium 10.0  10.1  9.3    Total Protein 7.4  7.2  6.7    Albumin 4.9  4.4  4.2    Total Bilirubin 0.7  0.3  0.5    Alkaline Phosphatase 77  76  70    AST (SGOT) 19  16  14    ALT (SGPT) 10  15  7    Total Cholesterol  183  169    Triglycerides  106  90    HDL Cholesterol  53  48    LDL Cholesterol   111  104    Hemoglobin A1C  6.20  6.20                   Assessment and Plan     Diagnoses and all orders for this visit:    1. Chronic pain of left knee (Primary)  Assessment & Plan:  Recommended icing daily, wearing compression sleeve, and start meloxicam. Do not take ibuprofen with meloxicam.     Orders:  -     Ambulatory Referral to Physical Therapy  -     meloxicam (MOBIC) 7.5 MG tablet; Take 1 tablet by mouth Daily.  Dispense: 30 tablet; Refill: 2    2. Essential hypertension  Assessment & Plan:  Hypertension is stable and controlled based on home readings.   Dietary sodium restriction.  Weight loss.  Regular aerobic exercise.  Ambulatory blood pressure monitoring.  Blood pressure will be reassessed  at next scheduled follow up .      Patient agrees with plan of care and understands instructions. Call if worsening symptoms or any problems or concerns.            Follow Up     Return if symptoms worsen or fail to improve, for Next scheduled follow up.  Patient was given instructions and counseling regarding her condition or for health maintenance advice. Please see specific information pulled into the AVS if appropriate.

## 2024-04-08 ENCOUNTER — TREATMENT (OUTPATIENT)
Dept: PHYSICAL THERAPY | Facility: CLINIC | Age: 69
End: 2024-04-08
Payer: MEDICARE

## 2024-04-08 DIAGNOSIS — G89.29 CHRONIC PAIN OF LEFT KNEE: Primary | ICD-10-CM

## 2024-04-08 DIAGNOSIS — M79.605 PAIN OF LEFT LOWER EXTREMITY: ICD-10-CM

## 2024-04-08 DIAGNOSIS — R29.898 DECREASED STRENGTH OF LOWER EXTREMITY: ICD-10-CM

## 2024-04-08 DIAGNOSIS — M25.562 CHRONIC PAIN OF LEFT KNEE: Primary | ICD-10-CM

## 2024-04-08 PROCEDURE — 97110 THERAPEUTIC EXERCISES: CPT

## 2024-04-08 PROCEDURE — 97530 THERAPEUTIC ACTIVITIES: CPT

## 2024-04-08 PROCEDURE — 97161 PT EVAL LOW COMPLEX 20 MIN: CPT

## 2024-04-08 NOTE — PROGRESS NOTES
Physical Therapy Initial Evaluation and Plan of Care                                               [address], [address]                                                           [place], [place]                                                         [phone]    Patient: Carly De   : 1955  Diagnosis/ICD-10 Code:  Chronic pain of left knee [M25.562, G89.29]  Referring practitioner: JANUARY Tanner  Date of Initial Visit: 2024  Today's Date: 2024  Patient seen for 1 sessions           Subjective Questionnaire: LEFS: 61      Subjective Evaluation    History of Present Illness  Mechanism of injury: Pt reports L knee pain beginning in 2024 without known injury or trauma. She was given a steroid pack which seemed to take her pain away completely until about 2 weeks ago when the pain returned, though it is not as severe. X-ray imaging came back negative.    In January, her L LE pain was so severe that she could not walk at times, and when she did walk she had to walk with L plantar flexion.    Her pain starts in her L knee anteriorly though she has intermittent pressure felt posteriorly. The pain is also felt laterally along her calf and goes into the lateral aspect of her L foot. Oftentimes, she will only experience pain and throbbing in her L foot. She also notes sharp pain along her L medial knee when moving to stand after prolonged sitting. Pain is worst at night when she is trying to fall asleep and her L lower leg often feels heavy at night.    Pt is very active, participating in [employer] 2-4x/week. She does not feel especially limited by her pain but she has had to modify her activity, including no squatting and less walking or standing.     She denies prior injury to her L LE but has bursitis in her R hip and has dealt with chronic low back pain on and off for 15 years        Patient Occupation: retired- did a lot of heavy lifting Quality of life:  good    Pain  Current pain ratin  At best pain ratin  At worst pain ratin  Location: L knee radiating to lateral L foot  Quality: sharp, throbbing, dull ache, radiating, discomfort, knife-like and squeezing  Relieving factors: medications, ice, relaxation and change in position (meloxicam; not helping)  Aggravating factors: ambulation, sleeping, prolonged positioning, repetitive movement, squatting, movement and standing  Progression: no change    Social Support  Lives in: one-story house    Diagnostic Tests  X-ray: abnormal (lumbar arthritis)    Treatments  Current treatment: medication  Patient Goals  Patient goals for therapy: decreased pain  Patient goal: bertha 2-4x/ week           Objective          Tenderness   Left Knee   Tenderness in the lateral patella, medial joint line and medial patella.     Active Range of Motion     Lumbar   Flexion: WFL  Extension: WFL  Left lateral flexion: WFL  Right lateral flexion: WFL  Left rotation: WFL  Right rotation: WFL  Left Knee   Flexion: 135 degrees   Extension: 0 degrees     Right Knee   Flexion: 135 degrees   Extension: 0 degrees     Additional Active Range of Motion Details  No pain with lumbar AROM  Slight pain/ discomfort in L knee with end-range flexion; felt discomfort at superior patella    Passive Range of Motion   Left Hip   Normal passive range of motion    Right Hip   Normal passive range of motion  Left Knee   Normal passive range of motion    Right Knee   Normal passive range of motion    Additional Passive Range of Motion Details  No pain with PROM    Strength/Myotome Testing     Left Hip   Planes of Motion   Flexion: 4+ (with pain in tibia)  Abduction: 4+    Right Hip   Planes of Motion   Flexion: 4+  Abduction: 4+    Left Knee   Flexion: 4  Extension: 5  Quadriceps contraction: good    Right Knee   Flexion: 4  Extension: 5  Quadriceps contraction: good    Left Ankle/Foot   Dorsiflexion: 5  Plantar flexion: 4    Right Ankle/Foot    Dorsiflexion: 5  Plantar flexion: 4    Ambulation     Observational Gait   Walking speed and stride length within functional limits.   Base of support: decreased          Assessment & Plan       Assessment  Impairments: abnormal or restricted ROM, activity intolerance, impaired physical strength, lacks appropriate home exercise program and pain with function   Functional limitations: sleeping, walking, uncomfortable because of pain, sitting, standing and unable to perform repetitive tasks   Assessment details: Carly is a 68 y/o female reporting to OP PT for initial evaluation regarding new onset of L LE pain. Her pain began in January 2024 without known injury or trauma and was treated with an oral steroid. This seemed to take her pain away completely until about 2 weeks ago when the pain returned, though not as severe. She describes an aching, throbbing pain in her L knee, lateral calf, and lateral L foot. Oftentimes her pain will only be present in her L lateral foot. The pain is constant and increases with prolonged sitting. Carly has difficulty falling asleep at night and has had to decrease her participation in her [employer] classes due to increased pain with squatting. Upon evaluation, she presents with pain at end-range flexion of her L knee, and bilateral LE strength deficits. Skilled OP PT is deemed reasonable and necessary in order to address these deficits, limitations, and impairments, and assist with return to prior level of function.   Prognosis: good    Goals  Plan Goals: Short Term: 4 weeks  1. Pt will be independent with initial HEP  2. Pt will report >/= 50% decrease in L LE pain   3. Pt will report improved ability to fall asleep at night with less disturbance from L LE pain  4. Pt will demonstrate full L knee flexion without complaints of pain at end-range for indication of decreased pain and improved ability to perform all ADLs and squatting    Long Term: 8 weeks  1. Pt will be independent  with progressed HEP  2. Pt will report 0-3/10 pain in her L LE  3. Pt will report improved ability to participate in her [employer] class without modifications for indication of decreased L LE pain during exercise and improved quality of life    Plan  Therapy options: will be seen for skilled therapy services  Planned modality interventions: cryotherapy  Planned therapy interventions: functional ROM exercises, home exercise program, therapeutic activities, stretching, strengthening and neuromuscular re-education  Frequency: 1x week  Duration in weeks: 8  Treatment plan discussed with: patient        Manual Therapy:    0     mins  45600;  Therapeutic Exercise:    10     mins  48308;     Neuromuscular Fernando:    0    mins  14290;    Therapeutic Activity:     10     mins  44800;     Gait Trainin     mins  34337;     Ultrasound:     0     mins  86269;    Electrical Stimulation:    0     mins  11671 ( );  Dry Needling     0     mins self-pay    Timed Treatment:   20   mins   Total Treatment:     41   mins    PT SIGNATURE: Aric Clifford PT, DPT  [place] License #: [id number]   Aric Clifford PT, 24, 10:25 AM EDT  DATE TREATMENT INITIATED: 2024    Initial Certification  Certification Period: 2024  I certify that the therapy services are furnished while this patient is under my care.  The services outlined above are required by this patient, and will be reviewed every 90 days.     PHYSICIAN: Kim Montenegro APRN      DATE:     Please sign and return via fax to [phone].. Thank you, [facility].

## 2024-04-15 ENCOUNTER — TREATMENT (OUTPATIENT)
Dept: PHYSICAL THERAPY | Facility: CLINIC | Age: 69
End: 2024-04-15
Payer: MEDICARE

## 2024-04-15 DIAGNOSIS — R29.898 DECREASED STRENGTH OF LOWER EXTREMITY: ICD-10-CM

## 2024-04-15 DIAGNOSIS — M25.562 CHRONIC PAIN OF LEFT KNEE: Primary | ICD-10-CM

## 2024-04-15 DIAGNOSIS — G89.29 CHRONIC PAIN OF LEFT KNEE: Primary | ICD-10-CM

## 2024-04-15 DIAGNOSIS — M79.605 PAIN OF LEFT LOWER EXTREMITY: ICD-10-CM

## 2024-04-15 PROCEDURE — 97110 THERAPEUTIC EXERCISES: CPT

## 2024-04-15 NOTE — PROGRESS NOTES
Physical Therapy Daily Progress Note                                            [address], [address]                                                           [place], [place]                                                         [phone]    Patient: Carly De   : 1955  Diagnosis/ICD-10 Code:  Chronic pain of left knee [M25.562, G89.29]  Referring practitioner: JANUARY Tanner  Date of Initial Visit: Type: THERAPY  Noted: 2024  Today's Date: 4/15/2024  Patient seen for 2 sessions           Subjective Evaluation    History of Present Illness    Subjective comment: Carly reports no change in her L LE pain. She completed her HEP about 5/7 days last week without increased pain       Objective   See Exercise, Manual, and Modality Logs for complete treatment.       Assessment & Plan       Assessment  Assessment details: Carly completed all L LE stretching and strengthening without complaints of pain. Her current HEP did not cause any increased pain but does not provide any relief of symptoms. Today, L LE strengthening was initiated in order to address L hip and knee impairments found at initial evaluation. Carly had slight difficulty maintaining full knee extension during both supine and long sitting straight leg raises, indicating deficits in L quad strength. Main focus today was on improving L hip flexor strength. We will target additional muscle groups at her next session.        Progress per Plan of Care           Manual Therapy:    0     mins  17400;  Therapeutic Exercise:    28     mins  85983;     Neuromuscular Fernando:    0    mins  29288;    Therapeutic Activity:     0     mins  36751;     Gait Trainin     mins  18949;     Ultrasound:     0     mins  66823;    Electrical Stimulation:    0     mins  13965 ( );  Dry Needling     0     mins self-pay    Timed Treatment:   28   mins   Total Treatment:     28   mins    Aric Clifford, PT, DPT  Physical  Therapist  [place] License #: [id number]  Aric Clifford, PT, 04/15/24, 10:58 AM EDT

## 2024-04-17 ENCOUNTER — OFFICE VISIT (OUTPATIENT)
Dept: GASTROENTEROLOGY | Facility: CLINIC | Age: 69
End: 2024-04-17
Payer: MEDICARE

## 2024-04-17 VITALS
DIASTOLIC BLOOD PRESSURE: 78 MMHG | HEIGHT: 67 IN | HEART RATE: 80 BPM | SYSTOLIC BLOOD PRESSURE: 130 MMHG | TEMPERATURE: 98.1 F | WEIGHT: 184.5 LBS | BODY MASS INDEX: 28.96 KG/M2

## 2024-04-17 DIAGNOSIS — K21.9 GASTROESOPHAGEAL REFLUX DISEASE WITHOUT ESOPHAGITIS: Primary | ICD-10-CM

## 2024-04-17 NOTE — PROGRESS NOTES
"Chief Complaint  Abdominal Pain and Heartburn    Subjective          History of Present Illness    Carly De is a  69 y.o. female presents for evaluation of abdominal pain and heartburn.  She is a patient of Dr. Del Rosario last seen in 2021.  She is new to me.    She is on omeprazole 40 mg daily for history of heartburn. She ran out of her medication recently and had some LUQ pain. This resolved with restarting omeprazole. Heartburn and reflux are controlled on omeprazole. No fever, hematochezia, melena, dysphagia. She is on mobic for knee pain--in PT.     She has a history of diverticulitis and IBS-like symptoms.  She takes Bentyl as needed.    2/21/2024 labs showed normal TSH, free T4, CMP and vitamin D.    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    Last colonoscopy 2020 with diverticulosis and no polyps. No FH of esophageal, gastric, colon cancer. No tobacco use    Objective   Vital Signs:   /78   Pulse 80   Temp 98.1 °F (36.7 °C)   Ht 170.2 cm (67\")   Wt 83.7 kg (184 lb 8 oz)   BMI 28.90 kg/m²       Physical Exam  Vitals reviewed.   Constitutional:       General: She is awake. She is not in acute distress.     Appearance: Normal appearance. She is well-developed and well-groomed.   HENT:      Head: Normocephalic.   Pulmonary:      Effort: Pulmonary effort is normal. No respiratory distress.   Skin:     Coloration: Skin is not pale.   Neurological:      Mental Status: She is alert and oriented to person, place, and time.      Gait: Gait is intact.   Psychiatric:         Mood and Affect: Mood and affect normal.         Speech: Speech normal.         Behavior: Behavior is cooperative.         Judgment: Judgment normal.          Result Review :             Assessment and Plan    Diagnoses and all orders for this visit:    1. Gastroesophageal reflux disease without esophagitis (Primary)    No further work up needed at this point given symptoms improved with restarting " Omeprazole. Could consider reducing dose down to 20mg in the next couple months if this still controls her symptoms. If symptoms recur, consider EGD. She will let me know.    Increase fiber in diet for diverticulosis history.     We did discuss the risks of long-term PPI use, including osteoporosis, hypomagnesemia, kidney disease, and increased risk of certain infections, associated cardiovascular disease, possibly even dementia, and I suggested multivitamin and calcium supplementation if PPI use is to be prolonged.      Follow Up   Return if symptoms worsen or fail to improve.    Dragon dictation used throughout this note.     Natividad Atkinson PA-C

## 2024-04-18 ENCOUNTER — LAB (OUTPATIENT)
Dept: ENDOCRINOLOGY | Age: 69
End: 2024-04-18
Payer: MEDICARE

## 2024-04-18 DIAGNOSIS — E78.5 HYPERLIPIDEMIA, UNSPECIFIED HYPERLIPIDEMIA TYPE: ICD-10-CM

## 2024-04-18 LAB
ALBUMIN SERPL-MCNC: 4.3 G/DL (ref 3.5–5.2)
ALBUMIN/GLOB SERPL: 1.7 G/DL
ALP SERPL-CCNC: 78 U/L (ref 39–117)
ALT SERPL-CCNC: 11 U/L (ref 1–33)
AST SERPL-CCNC: 18 U/L (ref 1–32)
BILIRUB SERPL-MCNC: 0.7 MG/DL (ref 0–1.2)
BUN SERPL-MCNC: 15 MG/DL (ref 8–23)
BUN/CREAT SERPL: 15.3 (ref 7–25)
CALCIUM SERPL-MCNC: 9.6 MG/DL (ref 8.6–10.5)
CHLORIDE SERPL-SCNC: 104 MMOL/L (ref 98–107)
CHOLEST SERPL-MCNC: 105 MG/DL (ref 0–200)
CO2 SERPL-SCNC: 26.9 MMOL/L (ref 22–29)
CREAT SERPL-MCNC: 0.98 MG/DL (ref 0.57–1)
EGFRCR SERPLBLD CKD-EPI 2021: 62.6 ML/MIN/1.73
GLOBULIN SER CALC-MCNC: 2.5 GM/DL
GLUCOSE SERPL-MCNC: 94 MG/DL (ref 65–99)
HDLC SERPL-MCNC: 52 MG/DL (ref 40–60)
IMP & REVIEW OF LAB RESULTS: NORMAL
LDLC SERPL CALC-MCNC: 40 MG/DL (ref 0–100)
POTASSIUM SERPL-SCNC: 4.6 MMOL/L (ref 3.5–5.2)
PROT SERPL-MCNC: 6.8 G/DL (ref 6–8.5)
SODIUM SERPL-SCNC: 141 MMOL/L (ref 136–145)
TRIGL SERPL-MCNC: 59 MG/DL (ref 0–150)
VLDLC SERPL CALC-MCNC: 13 MG/DL (ref 5–40)

## 2024-04-21 NOTE — PROGRESS NOTES
LDL improved to 40.  HDL 52.  Continue rosuvastatin 5 mg every evening.  Copy of labs sent to Luisa Lynn NP and to patient through [employer].

## 2024-04-24 ENCOUNTER — TREATMENT (OUTPATIENT)
Dept: PHYSICAL THERAPY | Facility: CLINIC | Age: 69
End: 2024-04-24
Payer: MEDICARE

## 2024-04-24 DIAGNOSIS — R29.898 DECREASED STRENGTH OF LOWER EXTREMITY: ICD-10-CM

## 2024-04-24 DIAGNOSIS — M79.605 PAIN OF LEFT LOWER EXTREMITY: ICD-10-CM

## 2024-04-24 DIAGNOSIS — G89.29 CHRONIC PAIN OF LEFT KNEE: Primary | ICD-10-CM

## 2024-04-24 DIAGNOSIS — M25.562 CHRONIC PAIN OF LEFT KNEE: Primary | ICD-10-CM

## 2024-04-24 PROCEDURE — 97110 THERAPEUTIC EXERCISES: CPT

## 2024-04-29 ENCOUNTER — APPOINTMENT (OUTPATIENT)
Dept: GENERAL RADIOLOGY | Facility: HOSPITAL | Age: 69
End: 2024-04-29
Payer: MEDICARE

## 2024-04-29 ENCOUNTER — HOSPITAL ENCOUNTER (EMERGENCY)
Facility: HOSPITAL | Age: 69
Discharge: HOME | End: 2024-04-29
Attending: EMERGENCY MEDICINE | Admitting: EMERGENCY MEDICINE
Payer: MEDICARE

## 2024-04-29 VITALS
TEMPERATURE: 97.8 F | RESPIRATION RATE: 16 BRPM | OXYGEN SATURATION: 95 % | HEART RATE: 78 BPM | SYSTOLIC BLOOD PRESSURE: 143 MMHG | DIASTOLIC BLOOD PRESSURE: 85 MMHG

## 2024-04-29 DIAGNOSIS — M25.562 ACUTE PAIN OF LEFT KNEE: Primary | ICD-10-CM

## 2024-04-29 PROCEDURE — 99283 EMERGENCY DEPT VISIT LOW MDM: CPT

## 2024-04-29 PROCEDURE — 73562 X-RAY EXAM OF KNEE 3: CPT

## 2024-04-29 RX ORDER — METHYLPREDNISOLONE 4 MG/1
TABLET ORAL
Qty: 21 EACH | Refills: 0 | Status: SHIPPED | OUTPATIENT
Start: 2024-04-29

## 2024-04-29 NOTE — DISCHARGE INSTRUCTIONS
Take medications as prescribed, ice and heat for symptom control, orthopedic follow-up as discussed, PCP follow-up as needed, ED return for worsening symptoms as needed.

## 2024-04-29 NOTE — ED PROVIDER NOTES
EMERGENCY DEPARTMENT ENCOUNTER    Room Number:  HC1/E  PCP: Luisa Lynn APRN  Historian: Patient      HPI:  Chief Complaint: Left knee pain  A complete HPI/ROS/PMH/PSH/SH/FH are unobtainable due to: None    Context: Carly De is a 69 y.o. female who presents to the ED via private vehicle c/o acute on chronic left knee pain and swelling.  Has been intermittently going on for several months.  Worsened recently.  Has been in physical therapy and trying Mobic without relief.  Had previously been on course of steroids earlier this year with some relief.  No fall or trauma, no redness or fevers.  Remains ambulatory.      MEDICAL RECORD REVIEW    External (non-ED) record review: No anticoagulants noted on chart review in epic              PAST MEDICAL HISTORY  Active Ambulatory Problems     Diagnosis Date Noted    Abnormal weight gain 02/09/2016    Lymphocytic thyroiditis 02/09/2016    Hoarseness 02/09/2016    Primary hypothyroidism 02/09/2016    Sleep apnea 02/09/2016    Vitamin D deficiency 06/22/2016    Medicare annual wellness visit, subsequent 10/05/2017    Elevated fasting glucose 07/02/2020    Diverticulitis 07/10/2020    Family history of diabetes mellitus (DM) 01/04/2021    History of gestational diabetes mellitus (GDM) 01/04/2021    Prediabetes 01/09/2021    Essential hypertension 07/06/2021    Dysphagia 08/09/2021    Menopause syndrome 11/06/2012    Left hip pain 02/13/2023    Acute non-recurrent frontal sinusitis 03/01/2023    Anxiety and depression 03/01/2023    Bilateral impacted cerumen 03/01/2023    Forgetfulness 04/14/2023    Cervical pain 04/14/2023    Vaginal candidiasis 10/02/2023    Urinary frequency 10/02/2023    Hematuria 10/02/2023    Immunization due 11/22/2023    Chronic pain of left knee 03/28/2024     Resolved Ambulatory Problems     Diagnosis Date Noted    Disorder of thyroid 02/09/2016    Incisional hernia, without obstruction or gangrene 11/21/2017     Past Medical History:    Diagnosis Date    Allergic     Arthritis     Bursitis     Cataract     Colon polyp 10/31/2017    Depression     Diabetes mellitus     Fever blister     GERD (gastroesophageal reflux disease)     Hashimoto's thyroiditis     Hypertension     Hypothyroidism     Irritable bowel syndrome     Migraine     Visual impairment          PAST SURGICAL HISTORY  Past Surgical History:   Procedure Laterality Date    COLONOSCOPY N/A 2005    COLONOSCOPY N/A 2010    pt reports no polyps, [address]    COLONOSCOPY N/A 10/31/2017    Procedure: COLONOSCOPY to Cecum;  Surgeon: Rene Patrick MD;  Location:  [facility];  Service:     COLONOSCOPY N/A 08/06/2020    Procedure: COLONOSCOPY TO CECUM WITH COLD SNARE POLYPECTOMY;  Surgeon: Minh Del Rosario MD;  Location:  [facility];  Service: Gastroenterology;  Laterality: N/A;  PRE: ABDOMINAL PAIN  POST:POLYP, DIVERTICULOSIS    DILATATION AND CURETTAGE  01/28/2015    Dr. Gan-benign endometrium w/ changes consistent w/ endometrial polyp    DILATATION AND CURETTAGE  09/13/2001    LRK--benign    ENDOSCOPY N/A 09/08/2021    Procedure: ESOPHAGOGASTRODUODENOSCOPY WITH COLD BIOPSIES;  Surgeon: Minh Del Rosario MD;  Location: [facility];  Service: Gastroenterology;  Laterality: N/A;  PRE- DYSPHAGIA  POST- ESOPHAGITIS, GASTRITIS, HIATAL HERNIA    HAND SURGERY      trigger finger    HERNIA REPAIR N/A 01/04/2012    Open repair of incarcerated epigastric ventral hernia w/ Ventralex mesh; Dr. Bandar Parks    TEMPORAL ARTERY BIOPSY Left 09/16/2011    Dr. Ines Russell    TUBAL ABDOMINAL LIGATION      VENTRAL HERNIA REPAIR N/A 12/14/2017    Procedure: VENTRAL HERNIA REPAIR LAPAROSCOPIC WITH DAVINCI ROBOT ROBOTIC ASSISTED LAPAROSCOPIC RECURRENT INCISIONAL HERNIA REPAIR x2 WITH MESH, WITH REMOVAL OF OLD MESH ;  Surgeon: Rene Patrick MD;  Location[facility] MAIN OR;  Service:     WRIST SURGERY           FAMILY HISTORY  Family History   Problem Relation  Age of Onset    Hypertension Mother             Diabetes Mother             Kidney disease Mother             Heart disease Father             Hypertension Brother     Hypertension Brother     Diabetes Brother     Vision loss Son     Malson Hyperthermia Neg Hx          SOCIAL HISTORY  Social History     Socioeconomic History    Marital status:    Tobacco Use    Smoking status: Former     Current packs/day: 0.00     Average packs/day: 0.5 packs/day for 5.0 years (2.5 ttl pk-yrs)     Types: Cigarettes     Start date: 1985     Quit date: 1990     Years since quittin.3     Passive exposure: Never    Smokeless tobacco: Former     Quit date:    Vaping Use    Vaping status: Never Used   Substance and Sexual Activity    Alcohol use: Yes     Alcohol/week: 1.0 standard drink of alcohol     Types: 1 Glasses of wine per week     Comment: Occasionally    Drug use: No    Sexual activity: Not Currently     Partners: Male     Birth control/protection: Post-menopausal         ALLERGIES  Patient has no known allergies.        REVIEW OF SYSTEMS  Review of Systems     All systems reviewed and negative except for those discussed in HPI.       PHYSICAL EXAM    I have reviewed the triage vital signs and nursing notes.    ED Triage Vitals   Temp Heart Rate Resp BP SpO2   24 1421 24 1421 24 1421 24 1428 24 1421   97.8 °F (36.6 °C) 84 16 146/89 97 %      Temp src Heart Rate Source Patient Position BP Location FiO2 (%)   -- -- -- -- --              Physical Exam  General: No acute distress, nontoxic  HEENT: EOMI  Pulm: Symmetric chest rise, nonlabored breathing  CV: Regular rate and rhythm  GI: Nondistended  MSK: No deformity, intact range of motion, trace if any edema, no cellulitis  Skin: Warm, dry  Neuro: Awake, alert, oriented x 4, moving all extremities, no focal deficits  Psych: Calm, cooperative    Vital signs and nursing notes reviewed.         LAB  RESULTS  No results found for this or any previous visit (from the past 24 hour(s)).    Ordered the above labs and independently interpreted results. My findings will be discussed in the medical decision making section below        RADIOLOGY  XR Knee 3 View Left    Result Date: 4/29/2024  XR KNEE 3 VW LEFT-  INDICATIONS: Pain  TECHNIQUE: 3 VIEWS OF THE LEFT KNEE  COMPARISON: None available  FINDINGS:  No acute fracture, erosion, or dislocation is identified. Minimal knee effusion. Follow-up/further evaluation can be obtained as indications persist.       As described.    This report was finalized on 4/29/2024 3:43 PM by Dr. Drew Savage M.D on Workstation: [employer]       Ordered the above noted radiological studies.  Independently interpreted by me and my independent review of findings can be found in the ED Course.  See dictation for official radiology interpretation.      PROCEDURES    Procedures        MEDICATIONS GIVEN IN ER    Medications - No data to display      PROGRESS, DATA ANALYSIS, CONSULTS, AND MEDICAL DECISION MAKING    Please note that this section constitutes my independent interpretation of clinical data including lab results, radiology, EKG's.  This constitutes my independent professional opinion regarding differential diagnosis and management of this patient.  It may include any factors such as history from outside sources, review of external records, social determinants of health, management of medications, response to those treatments, and discussions with other providers.    Differential Diagnosis and Plan: Initial concern for arthritic effusion, no concern at this point for infectious process, plan for x-ray and reevaluation with results.    Additional sources:  - Discussed/ obtained information from independent historians:       - (Social Determinants of Health): None     - Shared decision making:  Patient fully updated on and in agreement with the course and plan moving forward    ED  Course as of 04/29/24 1634   Mon Apr 29, 2024   1537 XR Knee 3 View Left  Per my independent interpretation of the x-ray, no evidence of fracture or dislocation [DC]   1632 Plan to place back on a steroid pack with outpatient orthopedic follow-up.  Supportive care measures discussed.  ED return for worsening symptoms as needed. [DC]      ED Course User Index  [DC] Marquis Rubio MD       Hospitalization Considered?:     Orders Placed During This Visit:  Orders Placed This Encounter   Procedures    XR Knee 3 View Left       Additional orders considered but not placed:      Independent interpretation of labs, radiology studies, and discussions with consultants: See ED Course        AS OF 16:34 EDT VITALS:    BP - 146/89  HR - 84  TEMP - 97.8 °F (36.6 °C)  02 SATS - 97%          DIAGNOSIS  Final diagnoses:   Acute pain of left knee         DISPOSITION  ED Disposition       ED Disposition   Discharge    Condition   Stable    Comment   --                Please note that portions of this document were completed with a voice recognition program.    Note Disclaimer: At [facility], we believe that sharing information builds trust and better relationships. You are receiving this note because you recently visited [facility]. It is possible you will see health information before a provider has talked with you about it. This kind of information can be easy to misunderstand. To help you fully understand what it means for your health, we urge you to discuss this note with your provider.                       Marquis Rubio MD  04/29/24 4313     Pili Sheppard, NP

## 2024-04-29 NOTE — ED NOTES
Patient to ER via car from home for L knee pain and swelling    Able to walk to triage  Denies injury

## 2024-05-01 ENCOUNTER — PATIENT OUTREACH (OUTPATIENT)
Dept: CASE MANAGEMENT | Facility: OTHER | Age: 69
End: 2024-05-01
Payer: MEDICARE

## 2024-05-01 ENCOUNTER — TREATMENT (OUTPATIENT)
Dept: PHYSICAL THERAPY | Facility: CLINIC | Age: 69
End: 2024-05-01
Payer: MEDICARE

## 2024-05-01 DIAGNOSIS — R29.898 DECREASED STRENGTH OF LOWER EXTREMITY: ICD-10-CM

## 2024-05-01 DIAGNOSIS — M79.605 PAIN OF LEFT LOWER EXTREMITY: ICD-10-CM

## 2024-05-01 DIAGNOSIS — G89.29 CHRONIC PAIN OF LEFT KNEE: Primary | ICD-10-CM

## 2024-05-01 DIAGNOSIS — M25.562 CHRONIC PAIN OF LEFT KNEE: Primary | ICD-10-CM

## 2024-05-01 PROCEDURE — 97530 THERAPEUTIC ACTIVITIES: CPT

## 2024-05-01 PROCEDURE — 97110 THERAPEUTIC EXERCISES: CPT

## 2024-05-01 NOTE — PROGRESS NOTES
Physical Therapy Daily Progress Note                                            [address], [address]                                                           [place], [place]                                                         [phone]    Patient: Carly De   : 1955  Diagnosis/ICD-10 Code:  Chronic pain of left knee [M25.562, G89.29]  Referring practitioner: JANUARY Tanner  Date of Initial Visit: Type: THERAPY  Noted: 2024  Today's Date: 2024  Patient seen for 4 sessions           Subjective Evaluation    History of Present Illness    Subjective comment: Carly went to the ED on Monday due to severe, increased pain in her L knee. They took another x-ray which came back negative and gave her another steroid pack which has helped return to her pain to her normal level       Objective   See Exercise, Manual, and Modality Logs for complete treatment.       Assessment & Plan       Assessment  Assessment details: Carly tolerated her session well. Repetitions were not increased of previously performed exercises in order to not cause excess soreness following her visit to the ED. Carly demonstrates slight improvement in L hip strength through her ability to complete 2 sets of supine SLR and seated hip flexion with increased range of motion than when first performed. She tolerated addition of LAQ and standing heel raises which were added to assist with improving quad and gastroc strength. Discussion had regarding importance of scheduling a consultation with orthopedic surgery or sports medicine to address her worsening L knee pain since it became so bad that she required a trip to the ED. We will plan to re-assess next session and make plan pending progress or lack there of.         Progress per Plan of Care           Manual Therapy:    0     mins  00110;  Therapeutic Exercise:    28     mins  39637;     Neuromuscular Fernando:    0    mins  62236;    Therapeutic Activity:      10     mins  71677;     Gait Trainin     mins  89657;     Ultrasound:     0     mins  01228;    Electrical Stimulation:    0     mins  70724 ( );  Dry Needling     0     mins self-pay    Timed Treatment:   38   mins   Total Treatment:     38   mins    Aric Clifford PT, DPT  Physical Therapist  [place] License #: [id number]  Aric Clifford PT, 24, 1:00 PM EDT

## 2024-05-01 NOTE — OUTREACH NOTE
AMBULATORY CASE MANAGEMENT NOTE    Names and Relationships of Patient/Support Persons: Caller: Carly De; Relationship: Self -     Patient Outreach    Incoming call from the patient responding to VM from [facility].  Introduced self and explained role and purpose of follow up outreach related to recent ED visit.  Discussed visit and she has no needs.  She is making a follow up orthopedic appointment.  She denies needs or questions.  Discussed HRCM program and services.  She declines at this time.  She will contact [facility] with any future interest or needs and verified she has [facility] contact information.     Raina MARTIN  Ambulatory Case Management    5/1/2024, 12:34 EDT

## 2024-05-10 ENCOUNTER — TREATMENT (OUTPATIENT)
Dept: PHYSICAL THERAPY | Facility: CLINIC | Age: 69
End: 2024-05-10
Payer: MEDICARE

## 2024-05-10 DIAGNOSIS — R29.898 DECREASED STRENGTH OF LOWER EXTREMITY: ICD-10-CM

## 2024-05-10 DIAGNOSIS — G89.29 CHRONIC PAIN OF LEFT KNEE: Primary | ICD-10-CM

## 2024-05-10 DIAGNOSIS — M79.605 PAIN OF LEFT LOWER EXTREMITY: ICD-10-CM

## 2024-05-10 DIAGNOSIS — M25.562 CHRONIC PAIN OF LEFT KNEE: Primary | ICD-10-CM

## 2024-05-21 DIAGNOSIS — J01.10 ACUTE NON-RECURRENT FRONTAL SINUSITIS: ICD-10-CM

## 2024-05-21 RX ORDER — MONTELUKAST SODIUM 10 MG/1
10 TABLET ORAL EVERY EVENING
Qty: 90 TABLET | Refills: 1 | OUTPATIENT
Start: 2024-05-21

## 2024-05-21 NOTE — TELEPHONE ENCOUNTER
Rx Refill Note  Requested Prescriptions     Pending Prescriptions Disp Refills    montelukast (SINGULAIR) 10 MG tablet [Pharmacy Med Name: MONTELUKAST 10MG TABLETS] 90 tablet 1     Sig: TAKE 1 TABLET BY MOUTH EVERY EVENING      Last office visit with prescribing clinician: 3/6/2024   Last telemedicine visit with prescribing clinician: Visit date not found   Next office visit with prescribing clinician: 9/9/2024                         Would you like a call back once the refill request has been completed: [] Yes [] No    If the office needs to give you a call back, can they leave a voicemail: [] Yes [] No    Kim Siegel  05/21/24, 13:42 EDT

## 2024-05-22 ENCOUNTER — OFFICE VISIT (OUTPATIENT)
Dept: FAMILY MEDICINE CLINIC | Facility: CLINIC | Age: 69
End: 2024-05-22
Payer: MEDICARE

## 2024-05-22 VITALS
BODY MASS INDEX: 28.82 KG/M2 | DIASTOLIC BLOOD PRESSURE: 80 MMHG | WEIGHT: 183.6 LBS | OXYGEN SATURATION: 97 % | TEMPERATURE: 97.7 F | HEART RATE: 113 BPM | SYSTOLIC BLOOD PRESSURE: 134 MMHG | HEIGHT: 67 IN

## 2024-05-22 DIAGNOSIS — J01.10 ACUTE NON-RECURRENT FRONTAL SINUSITIS: Primary | ICD-10-CM

## 2024-05-22 DIAGNOSIS — I10 ESSENTIAL HYPERTENSION: ICD-10-CM

## 2024-05-22 DIAGNOSIS — H61.23 BILATERAL IMPACTED CERUMEN: ICD-10-CM

## 2024-05-22 PROBLEM — J01.00 ACUTE NON-RECURRENT MAXILLARY SINUSITIS: Status: ACTIVE | Noted: 2024-05-22

## 2024-05-22 RX ORDER — DICLOFENAC SODIUM 75 MG/1
75 TABLET, DELAYED RELEASE ORAL 2 TIMES DAILY
COMMUNITY
End: 2024-05-22

## 2024-05-22 RX ORDER — AMOXICILLIN 500 MG/1
500 CAPSULE ORAL 2 TIMES DAILY
Qty: 14 CAPSULE | Refills: 0 | Status: SHIPPED | OUTPATIENT
Start: 2024-05-22

## 2024-05-22 RX ORDER — TIMOLOL MALEATE 5 MG/ML
1 SOLUTION/ DROPS OPHTHALMIC 2 TIMES DAILY
COMMUNITY
Start: 2024-04-24

## 2024-05-22 RX ORDER — MONTELUKAST SODIUM 10 MG/1
10 TABLET ORAL NIGHTLY
Qty: 90 TABLET | Refills: 1 | Status: SHIPPED | OUTPATIENT
Start: 2024-05-22

## 2024-05-22 NOTE — PROGRESS NOTES
"Chief Complaint  Hypertension (6 month follow up/), Knee Pain (Left ongoing/PT, meds not helping/), and Eye Problem (\"Hardness\" under eyes/)    Subjective        Carly De presents to [facility]  History of Present Illness  Patient presents to the office today with new onset of sinus pressure and congestion. She has noticed ear fullness, denies fever or chills.   With HTN blood pressure today is 134/80,, she reports checking bp at home with normal readings. She denies cp soa .                                 Objective   Vital Signs:  /80 (BP Location: Left arm, Patient Position: Sitting, Cuff Size: Adult)   Pulse 113   Temp 97.7 °F (36.5 °C)   Ht 170.2 cm (67.01\")   Wt 83.3 kg (183 lb 9.6 oz)   SpO2 97%   BMI 28.75 kg/m²   Estimated body mass index is 28.75 kg/m² as calculated from the following:    Height as of this encounter: 170.2 cm (67.01\").    Weight as of this encounter: 83.3 kg (183 lb 9.6 oz).             Physical Exam  Constitutional:       Appearance: Normal appearance.   HENT:      Right Ear: Tympanic membrane, ear canal and external ear normal. No decreased hearing noted. Tenderness present. No drainage. There is impacted cerumen. No foreign body. Tympanic membrane is not perforated or erythematous.      Left Ear: Tympanic membrane and external ear normal. No decreased hearing noted. Tenderness present. No drainage. There is impacted cerumen. No foreign body. Tympanic membrane is not perforated or erythematous.      Nose: Nasal tenderness, mucosal edema, congestion and rhinorrhea present.      Right Turbinates: Not enlarged.      Left Turbinates: Not enlarged.      Right Sinus: Maxillary sinus tenderness and frontal sinus tenderness present.      Left Sinus: Maxillary sinus tenderness and frontal sinus tenderness present.      Mouth/Throat:      Mouth: Mucous membranes are moist.      Pharynx: Oropharyngeal exudate and posterior oropharyngeal erythema " present.   Eyes:      General:         Right eye: No discharge.         Left eye: No discharge.      Conjunctiva/sclera: Conjunctivae normal.   Cardiovascular:      Rate and Rhythm: Normal rate and regular rhythm.      Pulses: Normal pulses.      Heart sounds: Normal heart sounds. No murmur heard.  Pulmonary:      Breath sounds: Normal breath sounds. No wheezing or rhonchi.   Chest:      Chest wall: No tenderness.   Musculoskeletal:      Cervical back: No rigidity or tenderness.   Skin:     General: Skin is warm and dry.      Coloration: Skin is not pale.      Findings: No erythema.   Neurological:      Mental Status: She is alert.        Result Review :              Ear Cerumen Removal    Date/Time: 5/22/2024 11:53 AM    Performed by: Luisa Lynn APRN  Authorized by: Luisa Lynn APRN  Consent: Verbal consent obtained.  Risks and benefits: risks, benefits and alternatives were discussed  Consent given by: patient  Patient understanding: patient states understanding of the procedure being performed  Patient consent: the patient's understanding of the procedure matches consent given  Required items: required blood products, implants, devices, and special equipment available  Patient identity confirmed: verbally with patient    Anesthesia:  Local Anesthetic: none  Location details: left ear and right ear  Patient tolerance: patient tolerated the procedure well with no immediate complications  Procedure type: irrigation           Assessment and Plan     Diagnoses and all orders for this visit:    1. Acute non-recurrent frontal sinusitis (Primary)  Assessment & Plan:  Advised patient to rest and to increase fluid intake.  Tylenol or Motrin for fever, pain or discomfort as directed.  Discussed medication instructions and side effects with patient.  Follow-up if symptoms persist or worsen.      Orders:  -     montelukast (Singulair) 10 MG tablet; Take 1 tablet by mouth Every Night.  Dispense: 90 tablet;  Refill: 1  -     amoxicillin (AMOXIL) 500 MG capsule; Take 1 capsule by mouth 2 (Two) Times a Day.  Dispense: 14 capsule; Refill: 0    2. Bilateral impacted cerumen  -     Ear Cerumen Removal    3. Essential hypertension  Assessment & Plan:  Hypertension is stable and controlled  Continue current treatment regimen.  Blood pressure will be reassessed in 6 months.               Follow Up     Return in about 6 months (around 11/26/2024) for Medicare Wellness.  Patient was given instructions and counseling regarding her condition or for health maintenance advice. Please see specific information pulled into the AVS if appropriate.

## 2024-08-09 ENCOUNTER — DOCUMENTATION (OUTPATIENT)
Dept: PHYSICAL THERAPY | Facility: CLINIC | Age: 69
End: 2024-08-09
Payer: MEDICARE

## 2024-08-09 NOTE — PROGRESS NOTES
Discharge Summary  Discharge Summary from Physical Therapy Report      Dates  PT visit: 5/10/24  Number of Visits: 5     Discharge Status of Patient: See Treatment Note dated 5/10/24    Goals: Partially Met    Discharge Plan: Continue with current home exercise program as instructed    Comments Pt wished to discharge.    Date of Discharge 8/9/24        Aric Clifford, PT  Physical Therapist

## 2024-08-26 RX ORDER — LEVOTHYROXINE SODIUM 75 UG/1
TABLET ORAL
Qty: 90 TABLET | Refills: 2 | Status: SHIPPED | OUTPATIENT
Start: 2024-08-26

## 2024-08-26 NOTE — TELEPHONE ENCOUNTER
Rx Refill Note  Requested Prescriptions     Pending Prescriptions Disp Refills    levothyroxine (SYNTHROID, LEVOTHROID) 75 MCG tablet [Pharmacy Med Name: LEVOTHYROXINE 0.075MG (75MCG) TABS] 90 tablet 2     Sig: TAKE 1 TABLET BY MOUTH DAILY      Last office visit with prescribing clinician: 3/6/2024   Last telemedicine visit with prescribing clinician: Visit date not found   Next office visit with prescribing clinician: 9/9/2024                         Would you like a call back once the refill request has been completed: [] Yes [] No    If the office needs to give you a call back, can they leave a voicemail: [] Yes [] No    Jessy Siegel MA  08/26/24, 08:05 EDT

## 2024-08-27 NOTE — PROGRESS NOTES
"Chief Complaint  No chief complaint on file.    Subjective        Carly De presents to [facility]  History of Present Illness  Patient presents to the office today to establish care with me.  She is a previous patient of JANUARY Dee.  She has anxiety and depression which is stable today.  She is taking fluoxetine 10 mg.  She has an additional complaint today of right-sided bursitis.  She has taken ibuprofen OTC.  Without improvement.  She reports that she wakes up more with the pain and it will improve throughout the day.  She denies neuropathy symptoms.  I will start her on meloxicam 7.5 daily.  Patient to follow-up with me.  Blood pressure is 145/80.  She denies chest pain shortness of air.            Objective   Vital Signs:  /80 (BP Location: Left arm, Patient Position: Sitting, Cuff Size: Adult)   Pulse 83   Temp 97.8 °F (36.6 °C) (Infrared)   Resp 16   Ht 170.2 cm (67.01\")   Wt 87.5 kg (193 lb)   SpO2 99%   BMI 30.22 kg/m²   Estimated body mass index is 30.22 kg/m² as calculated from the following:    Height as of this encounter: 170.2 cm (67.01\").    Weight as of this encounter: 87.5 kg (193 lb).       BMI is >= 30 and <35. (Class 1 Obesity). The following options were offered after discussion;: weight loss educational material (shared in after visit summary)      Physical Exam  Constitutional:       General: She is not in acute distress.     Appearance: Normal appearance.   HENT:      Head: Normocephalic.   Eyes:      Pupils: Pupils are equal, round, and reactive to light.   Cardiovascular:      Rate and Rhythm: Normal rate.      Pulses: Normal pulses.      Heart sounds: Normal heart sounds.   Pulmonary:      Effort: Pulmonary effort is normal.      Breath sounds: Normal breath sounds.   Musculoskeletal:         General: Tenderness present.      Cervical back: Normal range of motion and neck supple.      Right hip: Tenderness present. Decreased " ENDOCRINE MEDICAL ASSISTANT ROOMING NOTE  Is the patient here for diabetes mellitus: NO     Rooming documentation of social history includes tobacco screening only.  Medication list reviewed and updated.    PCP: Tami Love APNP      Patient would like communication of their results via: [employer]    Refills Needed: No    range of motion.   Skin:     General: Skin is warm.   Neurological:      General: No focal deficit present.      Mental Status: She is alert and oriented to person, place, and time.   Psychiatric:         Mood and Affect: Mood normal.         Behavior: Behavior normal.         Thought Content: Thought content normal.         Judgment: Judgment normal.        Result Review :  The following data was reviewed by: JANUARY Christy on 02/13/2023:  Common labs    Common Labs 8/5/22 8/5/22 10/17/22 2/8/23 2/8/23 2/8/23    0906 0906  0926 0926 0926   Glucose 95     99   BUN 17     15   Creatinine 0.76     0.84   Sodium 140     138   Potassium 4.3     4.4   Chloride 102     103   Calcium 9.6     9.3   Total Protein 7.0     6.9   Albumin 4.3     4.3   Total Bilirubin 0.5     0.7   Alkaline Phosphatase 79     79   AST (SGOT) 20     18   ALT (SGPT) 11     9   WBC   4.80      Hemoglobin   13.8      Hematocrit   42.4      Platelets   308      Total Cholesterol    179     Triglycerides    96     HDL Cholesterol    52     LDL Cholesterol     109 (A)     Hemoglobin A1C  6.1 (A)   6.30 (A)    (A) Abnormal value       Comments are available for some flowsheets but are not being displayed.                        Assessment and Plan   Diagnoses and all orders for this visit:    1. Left hip pain (Primary)  -     meloxicam (Mobic) 7.5 MG tablet; Take 1 tablet by mouth Daily.  Dispense: 30 tablet; Refill: 1    2. Essential hypertension  Assessment & Plan:  Hypertension elevated 145/80.  Patient is to keep a BP log and follow-up with BP readings.      3. Acquired hypothyroidism  Assessment & Plan:  Stable follows endocrinologist.  Takes levothyroxine      Left hip pain advised patient to start using a heating pad at night.  Take Mobic.  Advised patient not to take Mobic and ibuprofen together.  Patient to follow-up with me for assessment on medication and to check a CMP.         I spent 15 minutes caring for Carly on this date of  service. This time includes time spent by me in the following activities:preparing for the visit, reviewing tests, obtaining and/or reviewing a separately obtained history, performing a medically appropriate examination and/or evaluation , counseling and educating the patient/family/caregiver, referring and communicating with other health care professionals , documenting information in the medical record, independently interpreting results and communicating that information with the patient/family/caregiver and care coordination  Follow Up   Return in about 2 months (around 4/13/2023) for Recheck, Medicare Wellness.  Patient was given instructions and counseling regarding her condition or for health maintenance advice. Please see specific information pulled into the AVS if appropriate.

## 2024-09-09 ENCOUNTER — OFFICE VISIT (OUTPATIENT)
Dept: ENDOCRINOLOGY | Age: 69
End: 2024-09-09
Payer: MEDICARE

## 2024-09-09 VITALS
HEART RATE: 73 BPM | OXYGEN SATURATION: 97 % | DIASTOLIC BLOOD PRESSURE: 82 MMHG | WEIGHT: 178.6 LBS | SYSTOLIC BLOOD PRESSURE: 134 MMHG | HEIGHT: 67 IN | TEMPERATURE: 97.3 F | BODY MASS INDEX: 28.03 KG/M2

## 2024-09-09 DIAGNOSIS — R73.03 PREDIABETES: ICD-10-CM

## 2024-09-09 DIAGNOSIS — E78.5 HYPERLIPIDEMIA, UNSPECIFIED HYPERLIPIDEMIA TYPE: ICD-10-CM

## 2024-09-09 DIAGNOSIS — G47.33 OBSTRUCTIVE SLEEP APNEA SYNDROME: ICD-10-CM

## 2024-09-09 DIAGNOSIS — E55.9 VITAMIN D DEFICIENCY: ICD-10-CM

## 2024-09-09 DIAGNOSIS — E03.9 PRIMARY HYPOTHYROIDISM: Primary | ICD-10-CM

## 2024-09-09 LAB
25(OH)D3+25(OH)D2 SERPL-MCNC: 49.9 NG/ML (ref 30–100)
ALBUMIN SERPL-MCNC: 4.2 G/DL (ref 3.5–5.2)
ALBUMIN/GLOB SERPL: 1.4 G/DL
ALP SERPL-CCNC: 83 U/L (ref 39–117)
ALT SERPL-CCNC: 12 U/L (ref 1–33)
AST SERPL-CCNC: 17 U/L (ref 1–32)
BILIRUB SERPL-MCNC: 0.4 MG/DL (ref 0–1.2)
BUN SERPL-MCNC: 13 MG/DL (ref 8–23)
BUN/CREAT SERPL: 17.1 (ref 7–25)
CALCIUM SERPL-MCNC: 9.6 MG/DL (ref 8.6–10.5)
CHLORIDE SERPL-SCNC: 101 MMOL/L (ref 98–107)
CHOLEST SERPL-MCNC: 120 MG/DL (ref 0–200)
CO2 SERPL-SCNC: 28.5 MMOL/L (ref 22–29)
CREAT SERPL-MCNC: 0.76 MG/DL (ref 0.57–1)
EGFRCR SERPLBLD CKD-EPI 2021: 84.9 ML/MIN/1.73
GLOBULIN SER CALC-MCNC: 2.9 GM/DL
GLUCOSE SERPL-MCNC: 92 MG/DL (ref 65–99)
HBA1C MFR BLD: 6.1 % (ref 4.8–5.6)
HDLC SERPL-MCNC: 58 MG/DL (ref 40–60)
IMP & REVIEW OF LAB RESULTS: NORMAL
LDLC SERPL CALC-MCNC: 48 MG/DL (ref 0–100)
POTASSIUM SERPL-SCNC: 4.2 MMOL/L (ref 3.5–5.2)
PROT SERPL-MCNC: 7.1 G/DL (ref 6–8.5)
SODIUM SERPL-SCNC: 139 MMOL/L (ref 136–145)
TRIGL SERPL-MCNC: 69 MG/DL (ref 0–150)
TSH SERPL DL<=0.005 MIU/L-ACNC: 0.76 UIU/ML (ref 0.27–4.2)
VLDLC SERPL CALC-MCNC: 14 MG/DL (ref 5–40)

## 2024-09-09 PROCEDURE — 99214 OFFICE O/P EST MOD 30 MIN: CPT | Performed by: INTERNAL MEDICINE

## 2024-09-09 PROCEDURE — 3044F HG A1C LEVEL LT 7.0%: CPT | Performed by: INTERNAL MEDICINE

## 2024-09-09 PROCEDURE — 3075F SYST BP GE 130 - 139MM HG: CPT | Performed by: INTERNAL MEDICINE

## 2024-09-09 PROCEDURE — 3079F DIAST BP 80-89 MM HG: CPT | Performed by: INTERNAL MEDICINE

## 2024-09-09 RX ORDER — LEVOTHYROXINE SODIUM 75 UG/1
TABLET ORAL
Qty: 90 TABLET | Refills: 2 | Status: SHIPPED | OUTPATIENT
Start: 2024-09-09

## 2024-09-09 NOTE — PROGRESS NOTES
Subjective   Carly De is a 69 y.o. female.     History of Present Illness     She has primary hypothyroidism and is on levothyroxine 75 mcg/day.  She has no history of goiter or head/neck radiation therapy.  She denies bowel changes.  She denies heat or cold intolerance.       She has vitamin D deficiency and is on vitamin D 2000 units daily.      She has prediabetes since 2020.  She has history of gestational diabetes mellitus with one pregnancy.  Her mother and a brother have diabetes mellitus.  Her fasting glucose in December 2020 is elevated at 116 mg per DL.  She has nocturia 1-2 times a night.  She denies polyuria or polydipsia.  Her last meal was at 12 AM.     Hemoglobin A1c done in August 2022 is 6.1% with a normal fasting glucose at 95 mg per DL.  Hemoglobin A1c done in February 2024 is elevated at 6.2%.  She is on metformin  mg once a day.     She had an eye exam in 11/23 and was told to have glaucoma and cataracts.  She did not have diabetic retinopathy.     She has hyperlipidemia and is on Crestor 5 mg/day.  She denies myalgia.  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  She switched to continuous estradiol and medroxyprogesterone in 2022 and has been amenorrheic since.  She had a normal bone density done at [facility] in July 2021.   She follows with her gynecologist Dr. Nicolas.     She has sleep apnea and is not using CPAP regularly.  She wakes up rested most of the time.  She denies waking herself up snoring.  She has not seen a sleep specialist for a while.     She has no history of hypertension, myocardial infarction or stroke.     She is retired  at [employer].    The following portions of the patient's history were reviewed and updated as appropriate: allergies, current medications, past family history, past medical history, past social history, past surgical history, and problem list.    Review of Systems  "  Eyes:  Negative for visual disturbance.   Respiratory:  Negative for shortness of breath.    Cardiovascular:  Negative for chest pain and palpitations.   Genitourinary: Negative.    Musculoskeletal:  Negative for myalgias.   Neurological:  Negative for numbness.     Vitals:    09/09/24 0939   BP: 134/82   Pulse: 73   Temp: 97.3 °F (36.3 °C)   TempSrc: Temporal   SpO2: 97%   Weight: 81 kg (178 lb 9.6 oz)   Height: 170.2 cm (67.01\")      Objective   Physical Exam  Constitutional:       Appearance: Normal appearance. She is not ill-appearing, toxic-appearing or diaphoretic.   Eyes:      General: No scleral icterus.        Right eye: No discharge.         Left eye: No discharge.      Extraocular Movements: Extraocular movements intact.      Conjunctiva/sclera: Conjunctivae normal.   Neck:      Vascular: No carotid bruit.   Cardiovascular:      Rate and Rhythm: Normal rate and regular rhythm.      Heart sounds: Normal heart sounds.      No gallop.   Pulmonary:      Breath sounds: Normal breath sounds. No rales.   Chest:      Chest wall: No tenderness.   Abdominal:      General: Bowel sounds are normal.      Palpations: Abdomen is soft.      Tenderness: There is no right CVA tenderness or left CVA tenderness.   Musculoskeletal:      Right lower leg: No edema.      Left lower leg: No edema.   Lymphadenopathy:      Cervical: No cervical adenopathy.   Skin:     General: Skin is warm.   Neurological:      Mental Status: She is alert and oriented to person, place, and time.       Lab on 04/18/2024   Component Date Value Ref Range Status    Total Cholesterol 04/18/2024 105  0 - 200 mg/dL Final    Comment: Cholesterol Reference Ranges  (U.S. Department of Health and Human Services ATP III  Classifications)  Desirable          <200 mg/dL  Borderline High    200-239 mg/dL  High Risk          >240 mg/dL  Triglyceride Reference Ranges  (U.S. Department of Health and Human Services ATP III  Classifications)  Normal           <150 " mg/dL  Borderline High  150-199 mg/dL  High             200-499 mg/dL  Very High        >500 mg/dL  HDL Reference Ranges  (U.S. Department of Health and Human Services ATP III  Classifications)  Low     <40 mg/dl (major risk factor for CHD)  High    >60 mg/dl ('negative' risk factor for CHD)  LDL Reference Ranges  (U.S. Department of Health and Human Services ATP III  Classifications)  Optimal          <100 mg/dL  Near Optimal     100-129 mg/dL  Borderline High  130-159 mg/dL  High             160-189 mg/dL  Very High        >189 mg/dL      Triglycerides 04/18/2024 59  0 - 150 mg/dL Final    HDL Cholesterol 04/18/2024 52  40 - 60 mg/dL Final    VLDL Cholesterol [place] 04/18/2024 13  5 - 40 mg/dL Final    LDL Chol Calc ([facility]) 04/18/2024 40  0 - 100 mg/dL Final    Glucose 04/18/2024 94  65 - 99 mg/dL Final    BUN 04/18/2024 15  8 - 23 mg/dL Final    Creatinine 04/18/2024 0.98  0.57 - 1.00 mg/dL Final    EGFR Result 04/18/2024 62.6  >60.0 mL/min/1.73 Final    Comment: GFR Normal >60  Chronic Kidney Disease <60  Kidney Failure <15      BUN/Creatinine Ratio 04/18/2024 15.3  7.0 - 25.0 Final    Sodium 04/18/2024 141  136 - 145 mmol/L Final    Potassium 04/18/2024 4.6  3.5 - 5.2 mmol/L Final    Chloride 04/18/2024 104  98 - 107 mmol/L Final    Total CO2 04/18/2024 26.9  22.0 - 29.0 mmol/L Final    Calcium 04/18/2024 9.6  8.6 - 10.5 mg/dL Final    Total Protein 04/18/2024 6.8  6.0 - 8.5 g/dL Final    Albumin 04/18/2024 4.3  3.5 - 5.2 g/dL Final    Globulin 04/18/2024 2.5  gm/dL Final    A/G Ratio 04/18/2024 1.7  g/dL Final    Total Bilirubin 04/18/2024 0.7  0.0 - 1.2 mg/dL Final    Alkaline Phosphatase 04/18/2024 78  39 - 117 U/L Final    AST (SGOT) 04/18/2024 18  1 - 32 U/L Final    ALT (SGPT) 04/18/2024 11  1 - 33 U/L Final    Interpretation 04/18/2024 Note   Final    Supplemental report is available.     Assessment & Plan   Diagnoses and all orders for this visit:    1. Primary hypothyroidism (Primary)  -     TSH Rfx On  Abnormal To Free T4    2. Vitamin D deficiency  -     Vitamin D,25-Hydroxy    3. Prediabetes  -     Comprehensive Metabolic Panel  -     Hemoglobin A1c    4. Hyperlipidemia, unspecified hyperlipidemia type  -     Lipid Panel    5. Obstructive sleep apnea syndrome      Continue levothyroxine 75 mcg a day.    Continue vitamin D3 2000 units/day.    Continue metformin  mg/day.    Continue Crestor 5 mg/day.    Follow-up with gynecologist.    Flu vaccine this fall.    Copy of my note sent to Luisa Lynn NP and Dr. Nicolas.    RTC 6 mos

## 2024-09-11 NOTE — PROGRESS NOTES
Hemoglobin A1c 6.1%.  Hemoglobin A1c elevated but not in diabetic range.  Normal thyroid function test.  Continue levothyroxine 75 mcg/day.  LDL 48.  HDL 58.  Continue Crestor 5 mg/day.  Normal vitamin D.  Continue vitamin D3 2000 units/day.  Copy of labs sent to Luisa Lynn NP, Dr. Nicolas and to patient through [employer].

## 2024-09-16 RX ORDER — METFORMIN HCL 500 MG
TABLET, EXTENDED RELEASE 24 HR ORAL
Qty: 90 TABLET | Refills: 2 | Status: SHIPPED | OUTPATIENT
Start: 2024-09-16

## 2024-09-23 RX ORDER — ESTRADIOL 1 MG/1
1 TABLET ORAL DAILY
OUTPATIENT
Start: 2024-09-23

## 2024-09-23 RX ORDER — MEDROXYPROGESTERONE ACETATE 2.5 MG/1
TABLET ORAL
OUTPATIENT
Start: 2024-09-23

## 2024-10-08 RX ORDER — OMEPRAZOLE 40 MG/1
40 CAPSULE, DELAYED RELEASE ORAL DAILY
Qty: 90 CAPSULE | Refills: 1 | Status: SHIPPED | OUTPATIENT
Start: 2024-10-08

## 2024-10-08 NOTE — TELEPHONE ENCOUNTER
Caller: Carly De    Relationship: Self    Requested Prescriptions:   Requested Prescriptions     Pending Prescriptions Disp Refills    omeprazole (priLOSEC) 40 MG capsule 90 capsule 1     Sig: Take 1 capsule by mouth Daily. for GERD      Pharmacy where request should be sent: [id number] [place], [place] - [address]  AT [facility] [phone] [facility] [phone]      Last office visit with prescribing clinician: 5/22/2024   Last telemedicine visit with prescribing clinician: Visit date not found   Next office visit with prescribing clinician: 12/2/2024     Additional details provided by patient: PATIENT STATES SHE IS NEEDING A NEW PRESCRIPTION FOR THIS.       Does the patient have less than a 3 day supply:  [x] Yes  [] No    Would you like a call back once the refill request has been completed: [] Yes [x] No    If the office needs to give you a call back, can they leave a voicemail: [] Yes [x] No    Faith Avila Rep   10/08/24 13:09 EDT

## 2024-10-15 RX ORDER — VALACYCLOVIR HYDROCHLORIDE 1 G/1
1000 TABLET, FILM COATED ORAL 2 TIMES DAILY
Qty: 30 TABLET | Refills: 2 | Status: SHIPPED | OUTPATIENT
Start: 2024-10-15

## 2024-12-02 ENCOUNTER — OFFICE VISIT (OUTPATIENT)
Dept: FAMILY MEDICINE CLINIC | Facility: CLINIC | Age: 69
End: 2024-12-02
Payer: MEDICARE

## 2024-12-02 VITALS
DIASTOLIC BLOOD PRESSURE: 80 MMHG | TEMPERATURE: 98.4 F | HEART RATE: 90 BPM | SYSTOLIC BLOOD PRESSURE: 158 MMHG | HEIGHT: 67 IN | BODY MASS INDEX: 28.53 KG/M2 | OXYGEN SATURATION: 98 % | WEIGHT: 181.8 LBS

## 2024-12-02 DIAGNOSIS — I10 ESSENTIAL HYPERTENSION: ICD-10-CM

## 2024-12-02 DIAGNOSIS — E03.9 PRIMARY HYPOTHYROIDISM: ICD-10-CM

## 2024-12-02 DIAGNOSIS — Z78.0 POST-MENOPAUSAL: ICD-10-CM

## 2024-12-02 DIAGNOSIS — R73.03 PREDIABETES: ICD-10-CM

## 2024-12-02 DIAGNOSIS — Z00.00 MEDICARE ANNUAL WELLNESS VISIT, SUBSEQUENT: Primary | ICD-10-CM

## 2024-12-02 DIAGNOSIS — H61.23 BILATERAL IMPACTED CERUMEN: ICD-10-CM

## 2024-12-02 PROCEDURE — 1159F MED LIST DOCD IN RCRD: CPT | Performed by: NURSE PRACTITIONER

## 2024-12-02 PROCEDURE — 3077F SYST BP >= 140 MM HG: CPT | Performed by: NURSE PRACTITIONER

## 2024-12-02 PROCEDURE — 1160F RVW MEDS BY RX/DR IN RCRD: CPT | Performed by: NURSE PRACTITIONER

## 2024-12-02 PROCEDURE — 1125F AMNT PAIN NOTED PAIN PRSNT: CPT | Performed by: NURSE PRACTITIONER

## 2024-12-02 PROCEDURE — G0439 PPPS, SUBSEQ VISIT: HCPCS | Performed by: NURSE PRACTITIONER

## 2024-12-02 PROCEDURE — 3044F HG A1C LEVEL LT 7.0%: CPT | Performed by: NURSE PRACTITIONER

## 2024-12-02 PROCEDURE — 69209 REMOVE IMPACTED EAR WAX UNI: CPT | Performed by: NURSE PRACTITIONER

## 2024-12-02 PROCEDURE — 3079F DIAST BP 80-89 MM HG: CPT | Performed by: NURSE PRACTITIONER

## 2024-12-02 RX ORDER — AMLODIPINE BESYLATE 5 MG/1
5 TABLET ORAL DAILY
Qty: 30 TABLET | Refills: 1 | Status: SHIPPED | OUTPATIENT
Start: 2024-12-02

## 2024-12-02 NOTE — PROGRESS NOTES
Subjective   The ABCs of the Annual Wellness Visit  Medicare Wellness Visit      Carly De is a 69 y.o. patient who presents for a Medicare Wellness Visit.    The following portions of the patient's history were reviewed and   updated as appropriate: allergies, current medications, past family history, past medical history, past social history, past surgical history, and problem list.    Compared to one year ago, the patient's physical   health is the same.  Compared to one year ago, the patient's mental   health is the same.    Recent Hospitalizations:  She was not admitted to the hospital during the last year.     Current Medical Providers:  Patient Care Team:  Luisa Lynn APRN as PCP - General (Nurse Practitioner)  Saul Gillespie MD as Consulting Physician (Endocrinology)  Minh Del Rosario MD as Consulting Physician (Gastroenterology)  Demario Nicolas MD as Consulting Physician (Obstetrics and Gynecology)  Franky Garcia MD as Consulting Physician (Neurology)    Outpatient Medications Prior to Visit   Medication Sig Dispense Refill    aspirin-acetaminophen-caffeine (EXCEDRIN MIGRAINE) 250-250-65 MG per tablet Take 1 tablet by mouth Every 6 (Six) Hours As Needed for Headache.      baclofen (LIORESAL) 10 MG tablet Take 1 tablet by mouth 3 (Three) Times a Day. 30 tablet 0    CeleBREX 200 MG capsule Take 1 capsule by mouth 2 (Two) Times a Day.      Cholecalciferol (VITAMIN D) 2000 units tablet Take 1 tablet by mouth Daily.      dicyclomine (BENTYL) 20 MG tablet Take 1 tablet by mouth Every 6 (Six) Hours. Indications: Irritable Bowel Syndrome 180 tablet 3    estradiol (ESTRACE) 1 MG tablet Take 1 tablet by mouth Daily.      fluticasone (FLONASE) 50 MCG/ACT nasal spray 2 sprays into the nostril(s) as directed by provider Daily. 9.9 g 0    levothyroxine (SYNTHROID, LEVOTHROID) 75 MCG tablet TAKE 1 TABLET BY MOUTH DAILY 90 tablet 2    medroxyPROGESTERone (PROVERA) 2.5 MG tablet        metFORMIN ER (GLUCOPHAGE-XR) 500 MG 24 hr tablet TAKE 1 TABLET DAILY WITH SUPPER 90 tablet 2    montelukast (Singulair) 10 MG tablet Take 1 tablet by mouth Every Night. 90 tablet 1    omeprazole (priLOSEC) 40 MG capsule Take 1 capsule by mouth Daily. for GERD 90 capsule 1    rosuvastatin (Crestor) 5 MG tablet Take 1 tablet by mouth Every Night. 90 tablet 2    timolol (TIMOPTIC) 0.5 % ophthalmic solution Administer 1 drop into the left eye 2 (Two) Times a Day.      valACYclovir (VALTREX) 1000 MG tablet TAKE 1 TABLET BY MOUTH TWICE DAILY 30 tablet 2     No facility-administered medications prior to visit.     No opioid medication identified on active medication list. I have reviewed chart for other potential  high risk medication/s and harmful drug interactions in the elderly.      Aspirin is not on active medication list.  Aspirin use is not indicated based on review of current medical condition/s. Risk of harm outweighs potential benefits.  .    Patient Active Problem List   Diagnosis    Abnormal weight gain    Lymphocytic thyroiditis    Hoarseness    Primary hypothyroidism    Sleep apnea    Vitamin D deficiency    Medicare annual wellness visit, subsequent    Elevated fasting glucose    Diverticulitis    Family history of diabetes mellitus (DM)    History of gestational diabetes mellitus (GDM)    Prediabetes    Essential hypertension    Dysphagia    Menopause syndrome    Left hip pain    Acute non-recurrent frontal sinusitis    Anxiety and depression    Bilateral impacted cerumen    Forgetfulness    Cervical pain    Vaginal candidiasis    Urinary frequency    Hematuria    Immunization due    Chronic pain of left knee    Acute non-recurrent maxillary sinusitis    Post-menopausal     Advance Care Planning Advance Directive is not on file.  ACP discussion was held with the patient during this visit. Patient has an advance directive (not in EMR), copy requested.            Objective   Vitals:    12/02/24 1341   BP:  "158/80   BP Location: Left arm   Patient Position: Sitting   Cuff Size: Large Adult   Pulse: 90   Temp: 98.4 °F (36.9 °C)   SpO2: 98%   Weight: 82.5 kg (181 lb 12.8 oz)   Height: 170.2 cm (67.01\")       Estimated body mass index is 28.47 kg/m² as calculated from the following:    Height as of this encounter: 170.2 cm (67.01\").    Weight as of this encounter: 82.5 kg (181 lb 12.8 oz).    BMI is >= 25 and <30. (Overweight) The following options were offered after discussion;: weight loss educational material (shared in after visit summary) and exercise counseling/recommendations       Does the patient have evidence of cognitive impairment? No  Lab Results   Component Value Date    CHLPL 120 2024    TRIG 69 2024    HDL 58 2024    LDL 48 2024    VLDL 14 2024    HGBA1C 6.10 (H) 2024     Ear Cerumen Removal    Date/Time: 2024 2:27 PM    Performed by: Luisa Lynn APRN  Authorized by: Luisa Lynn APRN  Consent: Verbal consent obtained. Written consent not obtained.  Risks and benefits: risks, benefits and alternatives were discussed  Consent given by: patient  Required items: required blood products, implants, devices, and special equipment available    Anesthesia:  Local Anesthetic: none  Location details: left ear and right ear  Patient tolerance: patient tolerated the procedure well with no immediate complications  Procedure type: irrigation   Sedation:  Patient sedated: no                                                                                                  Health  Risk Assessment    Smoking Status:  Social History     Tobacco Use   Smoking Status Former    Current packs/day: 0.00    Average packs/day: 0.5 packs/day for 5.0 years (2.5 ttl pk-yrs)    Types: Cigarettes    Start date: 1985    Quit date: 1990    Years since quittin.9    Passive exposure: Never   Smokeless Tobacco Former    Quit date:      Alcohol Consumption:  Social " History     Substance and Sexual Activity   Alcohol Use Yes    Alcohol/week: 1.0 standard drink of alcohol    Types: 1 Glasses of wine per week    Comment: Occasionally       Fall Risk Screen  NERIADI Fall Risk Assessment was completed, and patient is at LOW risk for falls.Assessment completed on:2024    Depression Screening   Little interest or pleasure in doing things? Not at all   Feeling down, depressed, or hopeless? Not at all   PHQ-2 Total Score 0      Health Habits and Functional and Cognitive Screenin/25/2024     7:47 AM   Functional & Cognitive Status   Do you have difficulty preparing food and eating? No    Do you have difficulty bathing yourself, getting dressed or grooming yourself? No    Do you have difficulty using the toilet? No    Do you have difficulty moving around from place to place? No    Do you have trouble with steps or getting out of a bed or a chair? No    Current Diet Other    Dental Exam Up to date    Eye Exam Up to date    Exercise (times per week) 2 times per week    Current Exercises Include Walking    Do you need help using the phone?  No    Are you deaf or do you have serious difficulty hearing?  No    Do you need help to go to places out of walking distance? No    Do you need help shopping? No    Do you need help preparing meals?  No    Do you need help with housework?  No    Do you need help with laundry? No    Do you need help taking your medications? No    Do you need help managing money? No    Do you ever drive or ride in a car without wearing a seat belt? No    Have you felt unusual stress, anger or loneliness in the last month? No    Who do you live with? Child    If you need help, do you have trouble finding someone available to you? No    Have you been bothered in the last four weeks by sexual problems? No    Do you have difficulty concentrating, remembering or making decisions? No        Patient-reported           Age-appropriate Screening Schedule:  Refer to  the list below for future screening recommendations based on patient's age, sex and/or medical conditions. Orders for these recommended tests are listed in the plan section. The patient has been provided with a written plan.    Health Maintenance List  Health Maintenance   Topic Date Due    DIABETIC FOOT EXAM  Never done    URINE MICROALBUMIN  Never done    TDAP/TD VACCINES (1 - Tdap) Never done    ZOSTER VACCINE (1 of 2) Never done    PAP SMEAR  11/05/2021    DXA SCAN  07/08/2023    BMI FOLLOWUP  04/14/2024    COVID-19 Vaccine (5 - 2024-25 season) 09/01/2024    INFLUENZA VACCINE  03/31/2025 (Originally 7/1/2024)    HEMOGLOBIN A1C  03/09/2025    DIABETIC EYE EXAM  07/24/2025    COLORECTAL CANCER SCREENING  08/06/2025    LIPID PANEL  09/09/2025    ANNUAL WELLNESS VISIT  12/02/2025    MAMMOGRAM  05/22/2026    HEPATITIS C SCREENING  Completed    Pneumococcal Vaccine 65+  Completed                                                                                                                                                CMS Preventative Services Quick Reference  Risk Factors Identified During Encounter  Immunizations Discussed/Encouraged: Influenza, Prevnar 20 (Pneumococcal 20-valent conjugate), COVID19, and RSV (Respiratory Syncytial Virus)  Dental Screening Recommended  Vision Screening Recommended    The above risks/problems have been discussed with the patient.  Pertinent information has been shared with the patient in the After Visit Summary.  An After Visit Summary and PPPS were made available to the patient.    Follow Up:   Next Medicare Wellness visit to be scheduled in 1 year.         Additional E&M Note during same encounter follows:  Patient has additional, significant, and separately identifiable condition(s)/problem(s) that require work above and beyond the Medicare Wellness Visit     Chief Complaint  Medicare Wellness-subsequent (Not fasting/Due DEXA scan)    Subjective   Patient presents office today  "for Medicare wellness visit.  Blood pressure is elevated 158/80.  Patient's had elevated blood pressures at home.  Last blood pressure in office was 150/95.  Patient denies chest pain shortness of air.  I would like to start patient on amlodipine 5 mg patient to follow-up in 2 weeks.  With ear fullness negative for ear ache or pain cerumen impaction today                Carly is also being seen today for additional medical problem/s.    Review of Systems   Constitutional:  Negative for activity change.   HENT:  Negative for ear pain, postnasal drip, sinus pressure and voice change.         Cerumen impaction   Eyes:  Negative for discharge and itching.   Respiratory:  Negative for cough, choking, shortness of breath and wheezing.    Gastrointestinal:  Negative for diarrhea and nausea.   Endocrine: Negative for cold intolerance and heat intolerance.   Genitourinary:  Negative for flank pain and frequency.   Musculoskeletal:  Negative for gait problem, joint swelling and myalgias.   Skin:  Negative for rash.   Allergic/Immunologic: Negative for environmental allergies, food allergies and immunocompromised state.   Neurological:  Negative for dizziness and seizures.   Hematological:  Negative for adenopathy.   Psychiatric/Behavioral:  Negative for agitation. The patient is not nervous/anxious.               Objective   Vital Signs:  /80 (BP Location: Left arm, Patient Position: Sitting, Cuff Size: Large Adult)   Pulse 90   Temp 98.4 °F (36.9 °C)   Ht 170.2 cm (67.01\")   Wt 82.5 kg (181 lb 12.8 oz)   SpO2 98%   BMI 28.47 kg/m²   Physical Exam  Constitutional:       General: She is not in acute distress.     Appearance: Normal appearance.   HENT:      Head: Normocephalic.      Right Ear: Tympanic membrane, ear canal and external ear normal. There is impacted cerumen. Tympanic membrane is not erythematous.      Left Ear: Tympanic membrane, ear canal and external ear normal. There is impacted cerumen. Tympanic " membrane is not erythematous.      Nose: Nose normal.   Eyes:      Pupils: Pupils are equal, round, and reactive to light.   Cardiovascular:      Rate and Rhythm: Normal rate.      Pulses: Normal pulses.      Heart sounds: Normal heart sounds.   Pulmonary:      Effort: Pulmonary effort is normal.      Breath sounds: Normal breath sounds.   Abdominal:      General: Bowel sounds are normal.      Palpations: Abdomen is soft.   Musculoskeletal:         General: Normal range of motion.      Cervical back: Normal range of motion and neck supple.   Skin:     General: Skin is warm.   Neurological:      General: No focal deficit present.      Mental Status: She is alert and oriented to person, place, and time.   Psychiatric:         Mood and Affect: Mood normal.         Behavior: Behavior normal.         Thought Content: Thought content normal.         Judgment: Judgment normal.         The following data was reviewed by: JANUARY Christy on 12/02/2024:  Data reviewed : Radiologic studies bone density scan  Common labs          2/21/2024    09:41 4/18/2024    09:24 9/9/2024    10:17   Common Labs   Glucose 93  94  92    BUN 15  15  13    Creatinine 0.85  0.98  0.76    Sodium 139  141  139    Potassium 4.2  4.6  4.2    Chloride 103  104  101    Calcium 9.3  9.6  9.6    Total Protein 6.7  6.8  7.1    Albumin 4.2  4.3  4.2    Total Bilirubin 0.5  0.7  0.4    Alkaline Phosphatase 70  78  83    AST (SGOT) 14  18  17    ALT (SGPT) 7  11  12    Total Cholesterol 169  105  120    Triglycerides 90  59  69    HDL Cholesterol 48  52  58    LDL Cholesterol  104  40  48    Hemoglobin A1C 6.20   6.10              Assessment and Plan Additional age appropriate preventative wellness advice topics were discussed during today's preventative wellness exam(some topics already addressed during AWV portion of the note above):    Physical Activity: Advised cardiovascular activity 150 minutes per week as tolerated. (example brisk walk for 30  minutes, 5 days a week).     Nutrition: Discussed nutrition plan with patient. Information shared in after visit summary. Goal is for a well balanced diet to enhance overall health.     Healthy Weight: Discussed current and goal BMI with patient. Steps to attain this goal discussed. Information shared in after visit summary.           Medicare annual wellness visit, subsequent  Counseling was provided on nutrition, physical activity, development, and injury prevention, dental health, and safe sex practices patient verbalizes understanding no additional questions were asked.;e       Essential hypertension  Hypertension is an unstable elevated today 158/80.  Patient has had multiple elevated blood pressure readings at home.  Starting amlodipine 5 mg.  Patient to work on healthy diet and exercise DASH diet included.  Patient to follow-up in 2 weeks.         Primary hypothyroidism  Stable with levothyroxine following Dr. Neal/endocrinology       Prediabetes  Stable takes metformin 500 mg follows Dr. WALT small       Post-menopausal    Orders:    DEXA Bone Density Axial; Future    Bilateral impacted cerumen               I spent 30 minutes caring for Carly on this date of service. This time includes time spent by me in the following activities:preparing for the visit, reviewing tests, obtaining and/or reviewing a separately obtained history, performing a medically appropriate examination and/or evaluation , counseling and educating the patient/family/caregiver, ordering medications, tests, or procedures, documenting information in the medical record, independently interpreting results and communicating that information with the patient/family/caregiver, and care coordination  Follow Up   Return in about 15 days (around 12/17/2024) for Recheck.  Patient was given instructions and counseling regarding her condition or for health maintenance advice. Please see specific information pulled into the AVS if appropriate.

## 2024-12-02 NOTE — ASSESSMENT & PLAN NOTE
Counseling was provided on nutrition, physical activity, development, and injury prevention, dental health, and safe sex practices patient verbalizes understanding no additional questions were asked.;e

## 2024-12-02 NOTE — ASSESSMENT & PLAN NOTE
Hypertension is an unstable elevated today 158/80.  Patient has had multiple elevated blood pressure readings at home.  Starting amlodipine 5 mg.  Patient to work on healthy diet and exercise DASH diet included.  Patient to follow-up in 2 weeks.

## 2024-12-09 RX ORDER — VALACYCLOVIR HYDROCHLORIDE 1 G/1
1000 TABLET, FILM COATED ORAL 2 TIMES DAILY
Qty: 30 TABLET | Refills: 2 | Status: SHIPPED | OUTPATIENT
Start: 2024-12-09

## 2024-12-17 ENCOUNTER — OFFICE VISIT (OUTPATIENT)
Dept: FAMILY MEDICINE CLINIC | Facility: CLINIC | Age: 69
End: 2024-12-17
Payer: MEDICARE

## 2024-12-17 VITALS
WEIGHT: 179.6 LBS | DIASTOLIC BLOOD PRESSURE: 90 MMHG | HEIGHT: 67 IN | TEMPERATURE: 98.2 F | BODY MASS INDEX: 28.19 KG/M2 | HEART RATE: 85 BPM | SYSTOLIC BLOOD PRESSURE: 144 MMHG | OXYGEN SATURATION: 99 %

## 2024-12-17 DIAGNOSIS — I10 ESSENTIAL HYPERTENSION: ICD-10-CM

## 2024-12-17 DIAGNOSIS — B37.0 ORAL THRUSH: ICD-10-CM

## 2024-12-17 DIAGNOSIS — M25.551 RIGHT HIP PAIN: Primary | ICD-10-CM

## 2024-12-17 DIAGNOSIS — Z12.4 CERVICAL CANCER SCREENING: ICD-10-CM

## 2024-12-17 PROCEDURE — 3080F DIAST BP >= 90 MM HG: CPT | Performed by: NURSE PRACTITIONER

## 2024-12-17 PROCEDURE — G2211 COMPLEX E/M VISIT ADD ON: HCPCS | Performed by: NURSE PRACTITIONER

## 2024-12-17 PROCEDURE — 1160F RVW MEDS BY RX/DR IN RCRD: CPT | Performed by: NURSE PRACTITIONER

## 2024-12-17 PROCEDURE — 3044F HG A1C LEVEL LT 7.0%: CPT | Performed by: NURSE PRACTITIONER

## 2024-12-17 PROCEDURE — 1159F MED LIST DOCD IN RCRD: CPT | Performed by: NURSE PRACTITIONER

## 2024-12-17 PROCEDURE — 3077F SYST BP >= 140 MM HG: CPT | Performed by: NURSE PRACTITIONER

## 2024-12-17 PROCEDURE — 1125F AMNT PAIN NOTED PAIN PRSNT: CPT | Performed by: NURSE PRACTITIONER

## 2024-12-17 PROCEDURE — 99214 OFFICE O/P EST MOD 30 MIN: CPT | Performed by: NURSE PRACTITIONER

## 2024-12-17 RX ORDER — NYSTATIN 100000 [USP'U]/ML
500000 SUSPENSION ORAL 4 TIMES DAILY
Qty: 60 ML | Refills: 0 | Status: SHIPPED | OUTPATIENT
Start: 2024-12-17 | End: 2024-12-27

## 2024-12-17 RX ORDER — AMLODIPINE BESYLATE 5 MG/1
5 TABLET ORAL DAILY
Qty: 90 TABLET | Refills: 1 | Status: SHIPPED | OUTPATIENT
Start: 2024-12-17

## 2024-12-17 NOTE — PROGRESS NOTES
"Chief Complaint  Hypertension (Follow up/Need referral for gynecology/), Mouth Lesions (Bumps on tongue/4 days/), and Hip Pain (Right hip, requesting imaging/One month-Celebrex no longer helping/Hx bursitis/)    Subjective        Carly De presents to [facility]  History of Present Illness  Patient presents to the office today with new onset of oral thrush. She denies any new skin condition of oral blisters or nonhealing lesions.   With HTN blood pressure todsay is 144/90. She is taking amlodipine 5mg . Patient to continue to keep a bp log   With new onset of right hip pain not controlled with otc medications/remedies.   Patient would like to be referred to GYN.         Hypertension    Mouth Lesions   Associated symptoms include mouth sores.   Hip Pain         Objective   Vital Signs:  /90 (BP Location: Left arm, Patient Position: Sitting, Cuff Size: Adult)   Pulse 85   Temp 98.2 °F (36.8 °C)   Ht 170.2 cm (67.01\")   Wt 81.5 kg (179 lb 9.6 oz)   SpO2 99%   BMI 28.12 kg/m²   Estimated body mass index is 28.12 kg/m² as calculated from the following:    Height as of this encounter: 170.2 cm (67.01\").    Weight as of this encounter: 81.5 kg (179 lb 9.6 oz).            Physical Exam  Constitutional:       General: She is not in acute distress.     Appearance: Normal appearance.   HENT:      Head: Normocephalic.      Mouth/Throat:      Lips: No lesions.      Mouth: Mucous membranes are dry. No oral lesions.   Eyes:      Pupils: Pupils are equal, round, and reactive to light.   Cardiovascular:      Rate and Rhythm: Normal rate.      Pulses: Normal pulses.      Heart sounds: Normal heart sounds.   Pulmonary:      Effort: Pulmonary effort is normal.      Breath sounds: Normal breath sounds.   Musculoskeletal:         General: Normal range of motion.      Cervical back: Normal range of motion and neck supple.   Skin:     General: Skin is warm.   Neurological:      General: No " focal deficit present.      Mental Status: She is alert and oriented to person, place, and time.   Psychiatric:         Mood and Affect: Mood normal.         Behavior: Behavior normal.         Thought Content: Thought content normal.         Judgment: Judgment normal.        Result Review :  The following data was reviewed by: JANUARY Christy on 12/17/2024:  Common labs          2/21/2024    09:41 4/18/2024    09:24 9/9/2024    10:17   Common Labs   Glucose 93  94  92    BUN 15  15  13    Creatinine 0.85  0.98  0.76    Sodium 139  141  139    Potassium 4.2  4.6  4.2    Chloride 103  104  101    Calcium 9.3  9.6  9.6    Total Protein 6.7  6.8  7.1    Albumin 4.2  4.3  4.2    Total Bilirubin 0.5  0.7  0.4    Alkaline Phosphatase 70  78  83    AST (SGOT) 14  18  17    ALT (SGPT) 7  11  12    Total Cholesterol 169  105  120    Triglycerides 90  59  69    HDL Cholesterol 48  52  58    LDL Cholesterol  104  40  48    Hemoglobin A1C 6.20   6.10                Assessment and Plan   Diagnoses and all orders for this visit:    1. Right hip pain (Primary)  -     XR Hip With or Without Pelvis 2 - 3 View Right; Future    2. Oral thrush  -     nystatin (MYCOSTATIN) 100,000 unit/mL suspension; Swish and spit 5 mL 4 (Four) Times a Day for 10 days.  Dispense: 60 mL; Refill: 0    3. Cervical cancer screening  -     Ambulatory Referral to Gynecology    4. Essential hypertension  -     amLODIPine (NORVASC) 5 MG tablet; Take 1 tablet by mouth Daily.  Dispense: 90 tablet; Refill: 1           I spent 30 minutes caring for Carly on this date of service. This time includes time spent by me in the following activities:preparing for the visit, reviewing tests, obtaining and/or reviewing a separately obtained history, performing a medically appropriate examination and/or evaluation , counseling and educating the patient/family/caregiver, ordering medications, tests, or procedures, referring and communicating with other health care  professionals , documenting information in the medical record, independently interpreting results and communicating that information with the patient/family/caregiver, and care coordination  Follow Up   Return in about 3 months (around 3/24/2025) for Recheck.  Patient was given instructions and counseling regarding her condition or for health maintenance advice. Please see specific information pulled into the AVS if appropriate.

## 2024-12-19 ENCOUNTER — HOSPITAL ENCOUNTER (OUTPATIENT)
Dept: GENERAL RADIOLOGY | Facility: HOSPITAL | Age: 69
Discharge: HOME | End: 2024-12-19
Admitting: NURSE PRACTITIONER
Payer: MEDICARE

## 2024-12-19 DIAGNOSIS — M25.551 RIGHT HIP PAIN: ICD-10-CM

## 2024-12-19 PROCEDURE — 73502 X-RAY EXAM HIP UNI 2-3 VIEWS: CPT

## 2025-01-07 RX ORDER — ROSUVASTATIN CALCIUM 5 MG/1
5 TABLET, COATED ORAL NIGHTLY
Qty: 90 TABLET | Refills: 2 | Status: SHIPPED | OUTPATIENT
Start: 2025-01-07 | End: 2026-01-07

## 2025-01-07 NOTE — TELEPHONE ENCOUNTER
Rx Refill Note  Requested Prescriptions     Pending Prescriptions Disp Refills    rosuvastatin (Crestor) 5 MG tablet 90 tablet 2     Sig: Take 1 tablet by mouth Every Night.      Last office visit with prescribing clinician: 9/9/2024   Last telemedicine visit with prescribing clinician: Visit date not found   Next office visit with prescribing clinician: 3/10/2025                         Would you like a call back once the refill request has been completed: [] Yes [] No    If the office needs to give you a call back, can they leave a voicemail: [] Yes [] No    Jessy Siegel MA  01/07/25, 11:10 EST

## 2025-01-10 ENCOUNTER — HOSPITAL ENCOUNTER (OUTPATIENT)
Facility: HOSPITAL | Age: 70
Discharge: HOME | End: 2025-01-10
Payer: MEDICARE

## 2025-01-10 DIAGNOSIS — Z78.0 POST-MENOPAUSAL: ICD-10-CM

## 2025-01-10 PROCEDURE — 77080 DXA BONE DENSITY AXIAL: CPT

## 2025-01-29 ENCOUNTER — OFFICE VISIT (OUTPATIENT)
Dept: NEUROLOGY | Facility: CLINIC | Age: 70
End: 2025-01-29
Payer: MEDICARE

## 2025-01-29 VITALS
SYSTOLIC BLOOD PRESSURE: 114 MMHG | WEIGHT: 184 LBS | HEIGHT: 67 IN | OXYGEN SATURATION: 98 % | DIASTOLIC BLOOD PRESSURE: 74 MMHG | BODY MASS INDEX: 28.88 KG/M2 | HEART RATE: 78 BPM

## 2025-01-29 DIAGNOSIS — R41.89 SUBJECTIVE COGNITIVE IMPAIRMENT: Primary | ICD-10-CM

## 2025-01-29 PROCEDURE — 99213 OFFICE O/P EST LOW 20 MIN: CPT | Performed by: STUDENT IN AN ORGANIZED HEALTH CARE EDUCATION/TRAINING PROGRAM

## 2025-01-29 PROCEDURE — 1160F RVW MEDS BY RX/DR IN RCRD: CPT | Performed by: STUDENT IN AN ORGANIZED HEALTH CARE EDUCATION/TRAINING PROGRAM

## 2025-01-29 PROCEDURE — 3078F DIAST BP <80 MM HG: CPT | Performed by: STUDENT IN AN ORGANIZED HEALTH CARE EDUCATION/TRAINING PROGRAM

## 2025-01-29 PROCEDURE — 1159F MED LIST DOCD IN RCRD: CPT | Performed by: STUDENT IN AN ORGANIZED HEALTH CARE EDUCATION/TRAINING PROGRAM

## 2025-01-29 PROCEDURE — 3074F SYST BP LT 130 MM HG: CPT | Performed by: STUDENT IN AN ORGANIZED HEALTH CARE EDUCATION/TRAINING PROGRAM

## 2025-01-29 NOTE — PROGRESS NOTES
Chief Complaint   Patient presents with    Subjective cognitive impairment       Patient ID: Carly De is a 70 y.o. female.    HPI:    The following portions of the patient's history were reviewed and updated as appropriate: allergies, current medications, past family history, past medical history, past social history, past surgical history and problem list.    Interval history:    Review of Systems   Neurological:  Negative for dizziness, tremors, seizures, syncope, facial asymmetry, speech difficulty, weakness, light-headedness, numbness and headaches.   Psychiatric/Behavioral:  Positive for confusion (about the same) and decreased concentration.       History of Present Illness  The patient is a 70-year-old female who presents to the neurology clinic for follow-up of subjective cognitive impairment.    She last scored 29 out of 30 on her Mini-Mental State Examination (MMSE) and had a mildly abnormal clock draw, where she inserted the number 1 between 11 and 12 but otherwise maricruz the clock correctly. She feels like her memory is the same. She reports satisfactory memory function over the past year, as observed by others. She maintains independence in personal care activities such as dressing, hygiene, and eating. Her son assists with yard work, while she manages driving, financial matters, medication administration, and household chores independently. She remains physically active and has experienced one instance in time perception on a specific wall clock with Marcel numerals at home, a problem identified by her grandson. This issue happened 2 months ago but does not occur consistently.    She also reports that her acid reflux symptoms have improved, allowing her to reduce her medication intake. She has not taken Prilosec for an extended period of months.    MEDICATIONS  Current: Prilosec      Vitals:    01/29/25 1011   BP: 114/74   Pulse: 78   SpO2: 98%       Neurological Exam  Mental Status    MMSE 29/30  - draws a 4 sided figure. Normal clock draw..      Physical Exam    Procedures    Assessment/Plan:    Assessment & Plan  1. Subjective cognitive impairment.  Her cognitive function remains stable, with no definite indications of Alzheimer's disease at this juncture. She scored 29 out of 30 on her MMSE and demonstrated the ability to draw a clock correctly during the visit. She has been informed about the potential risks associated with the use of Prilosec, including an association of memory complications in a group of people that took proton pump inhibitor medication. However, she has not been using Prilosec frequently. She was advised to maintain her physical activity regimen, as regular walking can help mitigate the risk of cognitive decline. She was also encouraged to engage in activities that stimulate her mind, such as reading, socializing, and spending time with family. Regular check-ups for vision, hearing, blood pressure, blood sugar, and cholesterol levels were recommended. If she perceives a deterioration in her cognitive function, a referral for further evaluation, including blood tests and additional diagnostic procedures, will be considered.       Follow-up  The patient will follow up in 1 year.   I spent at least 20 minutes caring for this patient on this date of service. This time includes time spent by me in the following activities as necessary: preparing for the visit, reviewing tests, medical records and previous visits, obtaining and/or reviewing a separately obtained history, performing a medically appropriate exam and/or evaluation, counseling and educating the patient, and/or communicating with other healthcare professionals, documenting information in the medical record, independently interpreting results and communicating that information with the patient, and developing a medically appropriate treatment plan with consideration of other conditions, medications, and treatments.    Patient or  patient representative verbalized consent for the use of Ambient Listening during the visit with  Franky Garcia MD for chart documentation. 1/29/2025  10:35 EST     Diagnoses and all orders for this visit:    1. Subjective cognitive impairment (Primary)           Franky Garcia MD

## 2025-03-07 ENCOUNTER — TELEPHONE (OUTPATIENT)
Dept: OBSTETRICS AND GYNECOLOGY | Age: 70
End: 2025-03-07

## 2025-03-07 NOTE — TELEPHONE ENCOUNTER
Caller: Carly De    Relationship:  Self    Best call back number: [phone] (home)       PATIENT CALLED REQUESTING TO CANCEL SAME DAY APPT.    Did the patient call AFTER the start time of their scheduled appointment?  []YES  [x]NO    Was the patient's appointment rescheduled? [x]YES  []NO    Any additional information: NOW SCHEDULED IN MAY WITH MONSTER..

## 2025-03-10 ENCOUNTER — OFFICE VISIT (OUTPATIENT)
Dept: ENDOCRINOLOGY | Age: 70
End: 2025-03-10
Payer: MEDICARE

## 2025-03-10 VITALS
WEIGHT: 186.6 LBS | HEIGHT: 67 IN | HEART RATE: 52 BPM | DIASTOLIC BLOOD PRESSURE: 60 MMHG | SYSTOLIC BLOOD PRESSURE: 112 MMHG | TEMPERATURE: 97.8 F | OXYGEN SATURATION: 98 % | BODY MASS INDEX: 29.29 KG/M2

## 2025-03-10 DIAGNOSIS — E03.9 PRIMARY HYPOTHYROIDISM: Primary | ICD-10-CM

## 2025-03-10 DIAGNOSIS — E55.9 VITAMIN D DEFICIENCY: ICD-10-CM

## 2025-03-10 DIAGNOSIS — G47.33 OBSTRUCTIVE SLEEP APNEA SYNDROME: ICD-10-CM

## 2025-03-10 DIAGNOSIS — E78.5 HYPERLIPIDEMIA, UNSPECIFIED HYPERLIPIDEMIA TYPE: ICD-10-CM

## 2025-03-10 DIAGNOSIS — R73.03 PREDIABETES: ICD-10-CM

## 2025-03-10 LAB
25(OH)D3+25(OH)D2 SERPL-MCNC: 45.4 NG/ML (ref 30–100)
ALBUMIN SERPL-MCNC: 4.3 G/DL (ref 3.5–5.2)
ALBUMIN/GLOB SERPL: 1.6 G/DL
ALP SERPL-CCNC: 78 U/L (ref 39–117)
ALT SERPL-CCNC: 10 U/L (ref 1–33)
AST SERPL-CCNC: 21 U/L (ref 1–32)
BILIRUB SERPL-MCNC: 0.7 MG/DL (ref 0–1.2)
BUN SERPL-MCNC: 13 MG/DL (ref 8–23)
BUN/CREAT SERPL: 15.7 (ref 7–25)
CALCIUM SERPL-MCNC: 9.7 MG/DL (ref 8.6–10.5)
CHLORIDE SERPL-SCNC: 102 MMOL/L (ref 98–107)
CHOLEST SERPL-MCNC: 122 MG/DL (ref 0–200)
CO2 SERPL-SCNC: 26 MMOL/L (ref 22–29)
CREAT SERPL-MCNC: 0.83 MG/DL (ref 0.57–1)
EGFRCR SERPLBLD CKD-EPI 2021: 75.9 ML/MIN/1.73
GLOBULIN SER CALC-MCNC: 2.7 GM/DL
GLUCOSE SERPL-MCNC: 93 MG/DL (ref 65–99)
HBA1C MFR BLD: 6.3 % (ref 4.8–5.6)
HDLC SERPL-MCNC: 58 MG/DL (ref 40–60)
IMP & REVIEW OF LAB RESULTS: NORMAL
LDLC SERPL CALC-MCNC: 49 MG/DL (ref 0–100)
POTASSIUM SERPL-SCNC: 4.4 MMOL/L (ref 3.5–5.2)
PROT SERPL-MCNC: 7 G/DL (ref 6–8.5)
SODIUM SERPL-SCNC: 140 MMOL/L (ref 136–145)
TRIGL SERPL-MCNC: 75 MG/DL (ref 0–150)
TSH SERPL DL<=0.005 MIU/L-ACNC: 0.91 UIU/ML (ref 0.27–4.2)
VLDLC SERPL CALC-MCNC: 15 MG/DL (ref 5–40)

## 2025-03-10 PROCEDURE — 3078F DIAST BP <80 MM HG: CPT | Performed by: INTERNAL MEDICINE

## 2025-03-10 PROCEDURE — G2211 COMPLEX E/M VISIT ADD ON: HCPCS | Performed by: INTERNAL MEDICINE

## 2025-03-10 PROCEDURE — 99214 OFFICE O/P EST MOD 30 MIN: CPT | Performed by: INTERNAL MEDICINE

## 2025-03-10 PROCEDURE — 3074F SYST BP LT 130 MM HG: CPT | Performed by: INTERNAL MEDICINE

## 2025-03-10 NOTE — PROGRESS NOTES
Subjective   Carly De is a 70 y.o. female.     History of Present Illness     She has primary hypothyroidism and is on levothyroxine 75 mcg/day.  She has no history of goiter or head/neck radiation therapy.  She denies bowel changes.  She denies heat or cold intolerance.       She has vitamin D deficiency and is on vitamin D 2000 units daily.      She has prediabetes since 2020.  She has history of gestational diabetes mellitus with one pregnancy.  Her mother and a brother have diabetes mellitus.  Her fasting glucose in December 2020 is elevated at 116 mg per DL.  She has nocturia 3 times a night.  She denies polyuria or polydipsia.  Her last meal was last night     Hemoglobin A1c done in August 2022 is 6.1% with a normal fasting glucose at 95 mg per DL.  Hemoglobin A1c done in February 2024 is elevated at 6.2%.  She is on metformin  mg once a day.     She had an eye exam in 12/24 and was told to have glaucoma and cataracts.  She did not have diabetic retinopathy.     She has hyperlipidemia and is on Crestor 5 mg/day.  She denies myalgia.  She gained 8 pounds since September 2024.     She went into her natural menopause at age 55.  She switched to continuous estradiol and medroxyprogesterone in 2022 and has been amenorrheic since.  She had a normal bone density done at [facility] in July 2021.   She will see Ary Almanza NP in May 2025.    Bone density done at [facility] in January 2025 was normal with a 5.5% decrease on the hip.     She has sleep apnea and is not using CPAP regularly.  She wakes up rested most of the time.  She denies waking herself up snoring.  She has not seen a sleep specialist for a while.    She has hypertension and is on amlodipine 5 mg daily prescribed by Luisa Lynn NP.     She has no history of myocardial infarction or stroke.     She is retired  at [employer].    The following portions of the patient's history were reviewed and updated as appropriate:  "allergies, current medications, past family history, past medical history, past social history, past surgical history, and problem list.    Review of Systems   Eyes:  Negative for visual disturbance.   Respiratory:  Negative for wheezing.    Cardiovascular:  Negative for chest pain and palpitations.   Gastrointestinal: Negative.    Genitourinary: Negative.    Musculoskeletal:  Negative for myalgias.   Neurological:  Negative for numbness.     Vitals:    03/10/25 0934   BP: 112/60   Pulse: 52   Temp: 97.8 °F (36.6 °C)   TempSrc: Oral   SpO2: 98%   Weight: 84.6 kg (186 lb 9.6 oz)   Height: 170.2 cm (67.01\")      Objective   Physical Exam  Constitutional:       General: She is not in acute distress.     Appearance: Normal appearance. She is not ill-appearing, toxic-appearing or diaphoretic.   Eyes:      General: No scleral icterus.        Right eye: No discharge.         Left eye: No discharge.   Cardiovascular:      Rate and Rhythm: Normal rate and regular rhythm.      Heart sounds: Normal heart sounds. No murmur heard.     No friction rub. No gallop.   Pulmonary:      Breath sounds: Normal breath sounds. No rales.   Chest:      Chest wall: No tenderness.   Abdominal:      General: Bowel sounds are normal.      Palpations: Abdomen is soft.      Tenderness: There is no right CVA tenderness or left CVA tenderness.   Musculoskeletal:      Right lower leg: No edema.      Left lower leg: No edema.   Neurological:      Mental Status: She is alert and oriented to person, place, and time.       Admission on 09/10/2024, Discharged on 09/10/2024   Component Date Value Ref Range Status    SARS Antigen 09/10/2024 Not Detected  Not Detected, Presumptive Negative Final    Influenza A Antigen ALVARO 09/10/2024 Not Detected  Not Detected Final    Influenza B Antigen ALVARO 09/10/2024 Not Detected  Not Detected Final    Internal Control 09/10/2024 Passed  Passed Final    Lot Number 09/10/2024 [id number]   Final    Expiration Date 09/10/2024 " [id number]   Final     Assessment & Plan   Diagnoses and all orders for this visit:    1. Primary hypothyroidism (Primary)  -     TSH Rfx On Abnormal To Free T4    2. Vitamin D deficiency  -     Vitamin D,25-Hydroxy    3. Prediabetes  -     Comprehensive Metabolic Panel  -     Hemoglobin A1c    4. Hyperlipidemia, unspecified hyperlipidemia type  -     Lipid Panel    5. Obstructive sleep apnea syndrome  -     TSH Rfx On Abnormal To Free T4      Continue levothyroxine 75 mcg/day.    Continue vitamin D3 2000 units/day.    Continue metformin 500 mg/day.  Continue Crestor 5 mg a day.  Continue no concentrated sweet low-fat diet.    Continue CPAP.  Advised to see a sleep specialist for follow-up.    Will defer decisions on estrogen replacement to gynecologist.    Continue amlodipine.  Will defer blood pressure control to PCP.    Copy of my note sent to Luisa Lynn NP.    RTC 6 mos.

## 2025-03-12 RX ORDER — TIMOLOL MALEATE 5 MG/ML
1 SOLUTION/ DROPS OPHTHALMIC 2 TIMES DAILY
OUTPATIENT
Start: 2025-03-12

## 2025-03-18 ENCOUNTER — RESULTS FOLLOW-UP (OUTPATIENT)
Dept: ENDOCRINOLOGY | Age: 70
End: 2025-03-18
Payer: MEDICARE

## 2025-03-18 NOTE — PROGRESS NOTES
Hemoglobin A1c 6.3%.  Hemoglobin A1c elevated but not in diabetic range.  Normal fasting glucose at 93 mg per DL.  Continue Crestor 5 mg  Normal thyroid function test.  Continue levothyroxine 75 mcg a day.  Normal vitamin D.  Continue vitamin D3 2000 units/day.  LDL 49.  HDL 58.  Triglycerides 75.  Continue Crestor 5 mg every evening.  Copy of labs sent to Luisa Lynn NP and [facility]

## 2025-03-18 NOTE — LETTER
Carly De  [address] [address]    March 24, 2025     Dear Ms. De:    Hemoglobin A1c 6.3%.  Hemoglobin A1c elevated but not in diabetic range.  Normal fasting glucose at 93 mg per DL.  Continue Crestor 5 mg  Normal thyroid function test.  Continue levothyroxine 75 mcg a day.  Normal vitamin D.  Continue vitamin D3 2000 units/day.  LDL 49.  HDL 58.  Triglycerides 75.  Continue Crestor 5 mg every evening.          Below are the results from your recent visit:    Resulted Orders   TSH Rfx On Abnormal To Free T4   Result Value Ref Range    TSH 0.912 0.270 - 4.200 uIU/mL   Vitamin D,25-Hydroxy   Result Value Ref Range    25 Hydroxy, Vitamin D 45.4 30.0 - 100.0 ng/mL      Comment:      Reference Range for Total Vitamin D 25(OH)  Deficiency <20.0 ng/mL  Insufficiency 21-29 ng/mL  Sufficiency  ng/mL  Toxicity >100 ng/ml     Comprehensive Metabolic Panel   Result Value Ref Range    Glucose 93 65 - 99 mg/dL    BUN 13 8 - 23 mg/dL    Creatinine 0.83 0.57 - 1.00 mg/dL    EGFR Result 75.9 >60.0 mL/min/1.73      Comment:      GFR Categories in Chronic Kidney Disease (CKD)/X09/  /X09/  GFR Category          GFR (mL/min/1.73)    Interpretation  G1/X09/                    90 or greater/X09/        Normal or high  (1)  G2//                    60-89                Mild decrease  (1)  G3a                   45-59                Mild to moderate  decrease  G3b                   30-44                Moderate to  severe decrease  G4                    15-29                Severe decrease  G5                    14 or less           Kidney failure//  /[id number]/  (1)In the absence of evidence of kidney disease, neither  GFR category G1 or G2 fulfill the criteria for CKD.  eGFR calculation 2021 CKD-EPI creatinine equation, which  does not include race as a factor      BUN/Creatinine Ratio 15.7 7.0 - 25.0    Sodium 140 136 - 145 mmol/L    Potassium 4.4 3.5 - 5.2 mmol/L    Chloride 102 98 - 107  mmol/L    Total CO2 26.0 22.0 - 29.0 mmol/L    Calcium 9.7 8.6 - 10.5 mg/dL    Total Protein 7.0 6.0 - 8.5 g/dL    Albumin 4.3 3.5 - 5.2 g/dL    Globulin 2.7 gm/dL    A/G Ratio 1.6 g/dL    Total Bilirubin 0.7 0.0 - 1.2 mg/dL    Alkaline Phosphatase 78 39 - 117 U/L    AST (SGOT) 21 1 - 32 U/L    ALT (SGPT) 10 1 - 33 U/L   Hemoglobin A1c   Result Value Ref Range    Hemoglobin A1C 6.30 (H) 4.80 - 5.60 %      Comment:      Hemoglobin A1C Ranges:  Increased Risk for Diabetes  5.7% to 6.4%  Diabetes                     >= 6.5%  Diabetic Goal                < 7.0%     Lipid Panel   Result Value Ref Range    Total Cholesterol 122 0 - 200 mg/dL      Comment:      Cholesterol Reference Ranges  (U.S. Department of Health and Human Services ATP III  Classifications)  Desirable          <200 mg/dL  Borderline High    200-239 mg/dL  High Risk          >240 mg/dL  Triglyceride Reference Ranges  (U.S. Department of Health and Human Services ATP III  Classifications)  Normal           <150 mg/dL  Borderline High  150-199 mg/dL  High             200-499 mg/dL  Very High        >500 mg/dL  HDL Reference Ranges  (U.S. Department of Health and Human Services ATP III  Classifications)  Low     <40 mg/dl (major risk factor for CHD)  High    >60 mg/dl ('negative' risk factor for CHD)  LDL Reference Ranges  (U.S. Department of Health and Human Services ATP III  Classifications)  Optimal          <100 mg/dL  Near Optimal     100-129 mg/dL  Borderline High  130-159 mg/dL  High             160-189 mg/dL  Very High        >189 mg/dL  LDL is calculated using the NIH LDL-C calculation.      Triglycerides 75 0 - 150 mg/dL    HDL Cholesterol 58 40 - 60 mg/dL    VLDL Cholesterol [place] 15 5 - 40 mg/dL    LDL Chol Calc (NIH) 49 0 - 100 mg/dL   Cardiovascular Risk Assessment   Result Value Ref Range    Interpretation Note       Comment:      Supplemental report is available.

## 2025-03-24 ENCOUNTER — OFFICE VISIT (OUTPATIENT)
Dept: FAMILY MEDICINE CLINIC | Facility: CLINIC | Age: 70
End: 2025-03-24
Payer: MEDICARE

## 2025-03-24 VITALS
OXYGEN SATURATION: 98 % | SYSTOLIC BLOOD PRESSURE: 142 MMHG | BODY MASS INDEX: 29.44 KG/M2 | HEIGHT: 67 IN | WEIGHT: 187.6 LBS | HEART RATE: 78 BPM | DIASTOLIC BLOOD PRESSURE: 94 MMHG | TEMPERATURE: 97.7 F

## 2025-03-24 DIAGNOSIS — I10 ESSENTIAL HYPERTENSION: Primary | ICD-10-CM

## 2025-03-24 DIAGNOSIS — R73.03 PREDIABETES: ICD-10-CM

## 2025-03-24 DIAGNOSIS — E78.2 MIXED HYPERLIPIDEMIA: ICD-10-CM

## 2025-03-24 NOTE — PROGRESS NOTES
"Chief Complaint  Hypertension (Follow up/Fasting/)    Subjective        Carly De presents to [facility]  History of Present Illness  Patient presents to the office today for a follow up on HTN. Blood pressure today is 142/94. With HTN taking amlmodipine 5mg   With HLD stable taking crestor 5mg.   With prediabetes continue working on Flood[employer] diet and exercise.                     Extremity Pain   The pain is present in the right hip, right upper leg and right knee. The problem has been comes and goes. The quality of the pain is described as aching. The pain is at a severity of 9/10. Pertinent negatives include no inability to bear weight, numbness, tingling, lower extremity swelling or redness. Additional comments: Sit or stand for awhile pain starts. At night when I lay down pain starts.  Hypertension        Objective   Vital Signs:  /94 (BP Location: Left arm, Patient Position: Sitting, Cuff Size: Adult)   Pulse 78   Temp 97.7 °F (36.5 °C)   Ht 170.2 cm (67.01\")   Wt 85.1 kg (187 lb 9.6 oz)   SpO2 98%   BMI 29.38 kg/m²   Estimated body mass index is 29.38 kg/m² as calculated from the following:    Height as of this encounter: 170.2 cm (67.01\").    Weight as of this encounter: 85.1 kg (187 lb 9.6 oz).            Physical Exam  Constitutional:       General: She is not in acute distress.     Appearance: Normal appearance.   HENT:      Head: Normocephalic.   Eyes:      Pupils: Pupils are equal, round, and reactive to light.   Cardiovascular:      Rate and Rhythm: Normal rate.      Pulses: Normal pulses.      Heart sounds: Normal heart sounds.   Pulmonary:      Effort: Pulmonary effort is normal.      Breath sounds: Normal breath sounds.   Musculoskeletal:         General: Normal range of motion.      Cervical back: Normal range of motion and neck supple.   Skin:     General: Skin is warm.   Neurological:      General: No focal deficit present.      Mental Status: She " is alert and oriented to person, place, and time.   Psychiatric:         Mood and Affect: Mood normal.         Behavior: Behavior normal.         Thought Content: Thought content normal.         Judgment: Judgment normal.        Result Review :  The following data was reviewed by: JANUARY Christy on 03/24/2025:  Common labs          4/18/2024    09:24 9/9/2024    10:17 3/10/2025    10:24   Common Labs   Glucose 94  92  93    BUN 15  13  13    Creatinine 0.98  0.76  0.83    Sodium 141  139  140    Potassium 4.6  4.2  4.4    Chloride 104  101  102    Calcium 9.6  9.6  9.7    Albumin 4.3  4.2  4.3    Total Bilirubin 0.7  0.4  0.7    Alkaline Phosphatase 78  83  78    AST (SGOT) 18  17  21    ALT (SGPT) 11  12  10    Total Cholesterol 105  120  122    Triglycerides 59  69  75    HDL Cholesterol 52  58  58    LDL Cholesterol  40  48  49    Hemoglobin A1C  6.10  6.30      Data reviewed : Radiologic studies hip xray, bone density scan            Assessment and Plan   Diagnoses and all orders for this visit:    1. Essential hypertension (Primary)  Assessment & Plan:  Hypertension is stable and controlled  Continue current treatment regimen.  Blood pressure will be reassessed in 6 months.      2. Prediabetes  Assessment & Plan:  Stable with metformin follows endocrinology      3. Mixed hyperlipidemia  Assessment & Plan:   Lipid abnormalities are stable    Plan:  Continue same medication/s without change.      Discussed medication dosage, use, side effects, and goals of treatment in detail.    Counseled patient on lifestyle modifications to help control hyperlipidemia.     Patient Treatment Goals:   LDL goal is less than 70    Followup in 6 months.             I spent 15 minutes caring for Carly on this date of service. This time includes time spent by me in the following activities:preparing for the visit, reviewing tests, obtaining and/or reviewing a separately obtained history, performing a medically appropriate  examination and/or evaluation , counseling and educating the patient/family/caregiver, ordering medications, tests, or procedures, documenting information in the medical record, independently interpreting results and communicating that information with the patient/family/caregiver, and care coordination  Follow Up   Return in about 5 months (around 8/19/2025) for Recheck.  Patient was given instructions and counseling regarding her condition or for health maintenance advice. Please see specific information pulled into the AVS if appropriate.

## 2025-03-26 RX ORDER — OMEPRAZOLE 40 MG/1
40 CAPSULE, DELAYED RELEASE ORAL DAILY
Qty: 90 CAPSULE | Refills: 1 | Status: SHIPPED | OUTPATIENT
Start: 2025-03-26

## 2025-03-30 PROBLEM — E78.2 MIXED HYPERLIPIDEMIA: Status: ACTIVE | Noted: 2025-03-30

## 2025-05-12 DIAGNOSIS — R73.03 PREDIABETES: Primary | ICD-10-CM

## 2025-05-12 RX ORDER — METFORMIN HYDROCHLORIDE 500 MG/1
TABLET, EXTENDED RELEASE ORAL
Qty: 90 TABLET | Refills: 2 | Status: SHIPPED | OUTPATIENT
Start: 2025-05-12

## 2025-05-12 NOTE — TELEPHONE ENCOUNTER
Rx Refill Note  Requested Prescriptions      No prescriptions requested or ordered in this encounter      Last office visit with prescribing clinician: 3/10/2025   Last telemedicine visit with prescribing clinician: Visit date not found   Next office visit with prescribing clinician: 9/10/2025                         Would you like a call back once the refill request has been completed: [] Yes [] No    If the office needs to give you a call back, can they leave a voicemail: [] Yes [] No    Nury Abad MA  05/12/25, 12:00 EDT

## 2025-07-07 ENCOUNTER — OFFICE VISIT (OUTPATIENT)
Dept: FAMILY MEDICINE CLINIC | Facility: CLINIC | Age: 70
End: 2025-07-07
Payer: MEDICARE

## 2025-07-07 VITALS
HEIGHT: 67 IN | OXYGEN SATURATION: 99 % | BODY MASS INDEX: 29.54 KG/M2 | DIASTOLIC BLOOD PRESSURE: 82 MMHG | TEMPERATURE: 97.8 F | HEART RATE: 83 BPM | SYSTOLIC BLOOD PRESSURE: 136 MMHG | WEIGHT: 188.2 LBS

## 2025-07-07 DIAGNOSIS — G89.29 CHRONIC PAIN OF RIGHT KNEE: ICD-10-CM

## 2025-07-07 DIAGNOSIS — M25.561 CHRONIC PAIN OF RIGHT KNEE: ICD-10-CM

## 2025-07-07 DIAGNOSIS — M54.31 SCIATICA OF RIGHT SIDE: Primary | ICD-10-CM

## 2025-07-07 DIAGNOSIS — M25.551 RIGHT HIP PAIN: ICD-10-CM

## 2025-07-07 PROCEDURE — 3044F HG A1C LEVEL LT 7.0%: CPT | Performed by: STUDENT IN AN ORGANIZED HEALTH CARE EDUCATION/TRAINING PROGRAM

## 2025-07-07 PROCEDURE — 1160F RVW MEDS BY RX/DR IN RCRD: CPT | Performed by: STUDENT IN AN ORGANIZED HEALTH CARE EDUCATION/TRAINING PROGRAM

## 2025-07-07 PROCEDURE — 1125F AMNT PAIN NOTED PAIN PRSNT: CPT | Performed by: STUDENT IN AN ORGANIZED HEALTH CARE EDUCATION/TRAINING PROGRAM

## 2025-07-07 PROCEDURE — 3079F DIAST BP 80-89 MM HG: CPT | Performed by: STUDENT IN AN ORGANIZED HEALTH CARE EDUCATION/TRAINING PROGRAM

## 2025-07-07 PROCEDURE — 99214 OFFICE O/P EST MOD 30 MIN: CPT | Performed by: STUDENT IN AN ORGANIZED HEALTH CARE EDUCATION/TRAINING PROGRAM

## 2025-07-07 PROCEDURE — 3075F SYST BP GE 130 - 139MM HG: CPT | Performed by: STUDENT IN AN ORGANIZED HEALTH CARE EDUCATION/TRAINING PROGRAM

## 2025-07-07 PROCEDURE — 1159F MED LIST DOCD IN RCRD: CPT | Performed by: STUDENT IN AN ORGANIZED HEALTH CARE EDUCATION/TRAINING PROGRAM

## 2025-07-07 RX ORDER — PREDNISONE 10 MG/1
10 TABLET ORAL DAILY
Qty: 5 TABLET | Refills: 0 | Status: SHIPPED | OUTPATIENT
Start: 2025-07-07 | End: 2025-07-12

## 2025-07-07 RX ORDER — BACLOFEN 10 MG/1
10 TABLET ORAL NIGHTLY PRN
Qty: 30 TABLET | Refills: 1 | Status: SHIPPED | OUTPATIENT
Start: 2025-07-07 | End: 2025-10-05

## 2025-07-07 NOTE — PROGRESS NOTES
Chief Complaint  Joint Pain (Right leg, knee and hip/6/10 pain today, worse when standing/)    Subjective        Carly De presents to [facility]  History of Present Illness  70-year-old female who usually follows JANUARY Christy is here for acute complaint of knee pain.    Pt c/o worsening pain in right hip radiating to her right knee x 2 months. Pt reports pain does behind her knee to medial right knee. Pt also reports intermittent right knee swelling and s/p PT for knee pain in the past. Pt reports hx of LBP and also that current pain when she has LBP associated with it.    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  Patient reports she does not have any other concerns that need to be addressed today.  Counseled patient avoid excessive  Joint Pain  Symptoms: joint pain and joint swelling    Symptoms: no abdominal pain, no anorexia, no change in stool, no chest pain, no chills, no congestion, no cough, no diaphoresis, no fatigue, no fever, no headaches, no myalgias, no nausea, no neck pain, no numbness, no rash, no sore throat, no swollen glands, no dysuria, no vertigo, no visual change, no vomiting and no weakness      Pain in lower right side of my back, pain on right leg from knee to my hip area.  Symptoms are: chronic.   Onset was 6 to 12 months.   Symptoms occur: constantly.  Symptoms include: joint pain and joint swelling.   Pertinent negative symptoms include no abdominal pain, no anorexia, no change in stool, no chest pain, no chills, no congestion, no cough, no diaphoresis, no fatigue, no fever, no headaches, no myalgias, no nausea, no neck pain, no numbness, no rash, no sore throat, no swollen glands, no dysuria, no vertigo, no visual change, no vomiting and no weakness.   Treatment and/or Medications comments include: Heating pad, excedrine,  aspercreme   Additional information: If i stand too long it would hurt  "more, sitting it would hurt when i get up, lying down trying to sleep it would hurt even more      Objective   Vital Signs:  /82 (BP Location: Left arm, Patient Position: Sitting, Cuff Size: Adult)   Pulse 83   Temp 97.8 °F (36.6 °C)   Ht 170.2 cm (67.01\")   Wt 85.4 kg (188 lb 3.2 oz)   SpO2 99%   BMI 29.47 kg/m²   Estimated body mass index is 29.47 kg/m² as calculated from the following:    Height as of this encounter: 170.2 cm (67.01\").    Weight as of this encounter: 85.4 kg (188 lb 3.2 oz).               Physical Exam  Constitutional:       Appearance: Normal appearance.   HENT:      Head: Normocephalic and atraumatic.   Eyes:      Conjunctiva/sclera: Conjunctivae normal.   Cardiovascular:      Rate and Rhythm: Normal rate and regular rhythm.      Heart sounds: Normal heart sounds.   Pulmonary:      Effort: Pulmonary effort is normal.      Breath sounds: Normal breath sounds.   Abdominal:      General: Bowel sounds are normal.      Palpations: Abdomen is soft.      Comments: Non-tender   Musculoskeletal:      Comments: Pain radiating to posterior right knee with right SLR test.  Medial anterior Right knee pain with right knee exam and ROM.  Positive right hip pain with ROM.   Skin:     General: Skin is warm.   Neurological:      General: No focal deficit present.      Mental Status: She is alert and oriented to person, place, and time.   Psychiatric:         Mood and Affect: Mood normal.         Behavior: Behavior normal.        Result Review :    The following data was reviewed by: JANUARY Crowley on 07/07/2025:  CMP          9/9/2024    10:17 3/10/2025    10:24   CMP   Glucose 92  93    BUN 13  13    Creatinine 0.76  0.83    EGFR 84.9  75.9    Sodium 139  140    Potassium 4.2  4.4    Chloride 101  102    Calcium 9.6  9.7    Total Protein 7.1  7.0    Albumin 4.2  4.3    Globulin 2.9  2.7    Total Bilirubin 0.4  0.7    Alkaline Phosphatase 83  78    AST (SGOT) 17  21    ALT (SGPT) 12  " 10    Albumin/Globulin Ratio 1.4  1.6    BUN/Creatinine Ratio 17.1  15.7        Lipid Panel          9/9/2024    10:17 3/10/2025    10:24   Lipid Panel   Total Cholesterol 120  122    Triglycerides 69  75    HDL Cholesterol 58  58    VLDL Cholesterol 14  15    LDL Cholesterol  48  49      TSH          9/9/2024    10:17 3/10/2025    10:24   TSH   TSH 0.757  0.912      A1C Last 3 Results          9/9/2024    10:17 3/10/2025    10:24   HGBA1C Last 3 Results   Hemoglobin A1C 6.10  6.30                       Assessment and Plan     Diagnoses and all orders for this visit:    1. Sciatica of right side (Primary)  -     XR Spine Lumbar 2 or 3 View; Future  -     Ambulatory Referral to Physical Therapy for Evaluation & Treatment  -     baclofen (LIORESAL) 10 MG tablet; Take 1 tablet by mouth At Night As Needed for Muscle Spasms for up to 90 days.  Dispense: 30 tablet; Refill: 1  -     Diclofenac Sodium (VOLTAREN) 1 % gel gel; Apply 4 g topically to the appropriate area as directed 2 (Two) Times a Day As Needed (MSK pain) for up to 90 days.  Dispense: 100 g; Refill: 2  -     predniSONE (DELTASONE) 10 MG tablet; Take 1 tablet by mouth Daily for 5 days. Avoid NSAIDS while taking steroids.  Dispense: 5 tablet; Refill: 0    2. Chronic pain of right knee  Assessment & Plan:  Counseled patient on avoiding excessive bending, lifting, prolonged standing or walking.    Orders:  -     XR Knee 3 View Right; Future  -     Ambulatory Referral to Physical Therapy for Evaluation & Treatment  -     baclofen (LIORESAL) 10 MG tablet; Take 1 tablet by mouth At Night As Needed for Muscle Spasms for up to 90 days.  Dispense: 30 tablet; Refill: 1  -     Diclofenac Sodium (VOLTAREN) 1 % gel gel; Apply 4 g topically to the appropriate area as directed 2 (Two) Times a Day As Needed (MSK pain) for up to 90 days.  Dispense: 100 g; Refill: 2  -     predniSONE (DELTASONE) 10 MG tablet; Take 1 tablet by mouth Daily for 5 days. Avoid NSAIDS while taking  steroids.  Dispense: 5 tablet; Refill: 0  -     Ambulatory Referral to Orthopedic Surgery    3. Right hip pain  -     XR Hips Bilateral With or Without Pelvis 2 View; Future  -     Ambulatory Referral to Physical Therapy for Evaluation & Treatment  -     baclofen (LIORESAL) 10 MG tablet; Take 1 tablet by mouth At Night As Needed for Muscle Spasms for up to 90 days.  Dispense: 30 tablet; Refill: 1  -     Diclofenac Sodium (VOLTAREN) 1 % gel gel; Apply 4 g topically to the appropriate area as directed 2 (Two) Times a Day As Needed (MSK pain) for up to 90 days.  Dispense: 100 g; Refill: 2  -     Ambulatory Referral to Orthopedic Surgery    Offered patient what restriction not until resolution of knee and back pain and clearance by specialist, however patient refused any work restriction note.      Instructed patient to Return to Clinic as needed for persistent or new symptoms and/or go to ER for worsening symptoms, patient voiced understanding.          Follow Up     Return in about 6 weeks (around 8/18/2025) for Next scheduled follow up.  Patient was given instructions and counseling regarding her condition or for health maintenance advice. Please see specific information pulled into the AVS if appropriate.

## 2025-07-09 ENCOUNTER — HOSPITAL ENCOUNTER (OUTPATIENT)
Dept: GENERAL RADIOLOGY | Facility: HOSPITAL | Age: 70
Discharge: HOME | End: 2025-07-09
Payer: MEDICARE

## 2025-07-09 DIAGNOSIS — M25.551 RIGHT HIP PAIN: ICD-10-CM

## 2025-07-09 DIAGNOSIS — M25.561 CHRONIC PAIN OF RIGHT KNEE: ICD-10-CM

## 2025-07-09 DIAGNOSIS — M54.31 SCIATICA OF RIGHT SIDE: ICD-10-CM

## 2025-07-09 DIAGNOSIS — G89.29 CHRONIC PAIN OF RIGHT KNEE: ICD-10-CM

## 2025-07-09 PROCEDURE — 73562 X-RAY EXAM OF KNEE 3: CPT

## 2025-07-09 PROCEDURE — 73521 X-RAY EXAM HIPS BI 2 VIEWS: CPT

## 2025-07-09 PROCEDURE — 72100 X-RAY EXAM L-S SPINE 2/3 VWS: CPT

## 2025-07-22 ENCOUNTER — OFFICE VISIT (OUTPATIENT)
Dept: SPORTS MEDICINE | Facility: CLINIC | Age: 70
End: 2025-07-22
Payer: MEDICARE

## 2025-07-22 VITALS — HEIGHT: 67 IN | RESPIRATION RATE: 18 BRPM | BODY MASS INDEX: 29.82 KG/M2 | WEIGHT: 190 LBS

## 2025-07-22 DIAGNOSIS — G89.29 CHRONIC PAIN OF RIGHT KNEE: Primary | ICD-10-CM

## 2025-07-22 DIAGNOSIS — M17.11 PRIMARY OSTEOARTHRITIS OF RIGHT KNEE: ICD-10-CM

## 2025-07-22 DIAGNOSIS — M25.561 CHRONIC PAIN OF RIGHT KNEE: Primary | ICD-10-CM

## 2025-07-22 DIAGNOSIS — M25.551 GREATER TROCHANTERIC PAIN SYNDROME OF RIGHT LOWER EXTREMITY: ICD-10-CM

## 2025-07-22 DIAGNOSIS — M76.891 PES ANSERINUS TENDINITIS OF RIGHT LOWER EXTREMITY: ICD-10-CM

## 2025-07-22 DIAGNOSIS — R29.898 HIP WEAKNESS: ICD-10-CM

## 2025-07-22 RX ORDER — MELOXICAM 7.5 MG/1
7.5 TABLET ORAL DAILY
Qty: 30 TABLET | Refills: 0 | Status: SHIPPED | OUTPATIENT
Start: 2025-07-22

## 2025-07-26 ENCOUNTER — PATIENT ROUNDING (BHMG ONLY) (OUTPATIENT)
Dept: SPORTS MEDICINE | Facility: CLINIC | Age: 70
End: 2025-07-26
Payer: MEDICARE

## 2025-07-29 ENCOUNTER — TREATMENT (OUTPATIENT)
Dept: PHYSICAL THERAPY | Facility: CLINIC | Age: 70
End: 2025-07-29
Payer: MEDICARE

## 2025-07-29 DIAGNOSIS — M70.51 PES ANSERINUS BURSITIS OF RIGHT KNEE: ICD-10-CM

## 2025-07-29 DIAGNOSIS — M25.551 GREATER TROCHANTERIC PAIN SYNDROME OF RIGHT LOWER EXTREMITY: ICD-10-CM

## 2025-07-29 DIAGNOSIS — R29.898 WEAKNESS OF RIGHT HIP: ICD-10-CM

## 2025-07-29 DIAGNOSIS — M17.11 PRIMARY OSTEOARTHRITIS OF RIGHT KNEE: ICD-10-CM

## 2025-07-29 DIAGNOSIS — G89.29 CHRONIC PAIN OF RIGHT KNEE: Primary | ICD-10-CM

## 2025-07-29 DIAGNOSIS — M25.561 CHRONIC PAIN OF RIGHT KNEE: Primary | ICD-10-CM

## 2025-07-29 NOTE — PROGRESS NOTES
Physical Therapy Initial Evaluation and Plan of Care    [address] [place] [address] [address]  [facility] [id number] [phone]  Patient: Carly De   : 1955  Diagnosis/ICD-10 Code:  Chronic pain of right knee [M25.561, G89.29]  Referring practitioner: Morenita Bates DO  Date of Initial Visit: 2025  Today's Date: 2025  Patient seen for 1 session         Visit Diagnoses:    ICD-10-CM ICD-9-CM   1. Chronic pain of right knee  M25.561 719.46    G89.29 338.29   2. Primary osteoarthritis of right knee  M17.11 715.16   3. Greater trochanteric pain syndrome of right lower extremity  M25.551 719.45   4. Weakness of right hip  R29.898 729.89   5. Pes anserinus bursitis of right knee  M70.51 726.61         Subjective Questionnaire: LEFS: 53 (34%)      Subjective Evaluation    History of Present Illness  Mechanism of injury: Pain behind R knee off/on for about 3 months without known injury. Stopped doing [employer]he[employer] shortly before this and started work again. Denies catching/locking or giving way. No prior knee or hip injury or low back injury but does have occasional LBP.    Xrays of hip and knee negative for fractures    PMH includes Type II DM      Patient Occupation: part-time office cleaning Pain  Current pain rating: 3  At worst pain rating: 10  Location: post-medial R knee and lateral and ant R hip  Aggravating factors: squatting, ambulation and stairs (prolonged standing)    Social Support  Lives in: one-story house    Treatments  Treatments tried: just started Meloxicam.  Patient Goals  Patient goals for therapy: decreased pain and return to sport/leisure activities             Objective          Palpation     Additional Palpation Details  R pes anserine TTP    Tenderness     Right Hip   Tenderness in the greater trochanter.     Right Knee   Tenderness in the inferior fat pad and pes anserinus.     Active Range of Motion   Left Hip   Normal active range of motion    Right Hip   Normal active range  of motion  Left Knee   Normal active range of motion    Right Knee   Normal active range of motion    Strength/Myotome Testing     Left Hip   Planes of Motion   Flexion: 4+  Abduction: 4+  Adduction: 5  External rotation: 5  Internal rotation: 5    Right Hip   Planes of Motion   Flexion: 4+  Abduction: 4+  Adduction: 5  External rotation: 4+  Internal rotation: 4+    Left Knee   Flexion: 5  Extension: 5    Right Knee   Flexion: 5  Extension: 4+    Tests     Right Hip   Positive ZuleikaGreg   Sebastian: Positive.     Right Hip Flexibility Comments:   Tight HS, gastroc; hip flexors, quads, TFL/ITB    Ambulation     Ambulation: Level Surfaces   Ambulation without assistive device: independent    Additional Level Surfaces Ambulation Details  No notable antalgia brief walk          Assessment & Plan       Assessment  Impairments: impaired physical strength, lacks appropriate home exercise program and pain with function   Functional limitations: walking, standing and stooping   Assessment details: Carly De is 70 y.o. female who presents today to Physical Therapy for onset pain R knee and hip a few months ago. Did have a recent change in activities which may have contributed but no known injury. Presents with mild weakness R knee and hips and with mild-moderate mm flexibility deficits. Moderate tenderness R pes anserine and greater trochanter. Patient would benefit from skilled physical therapy to address functional limitations and impairments to improve quality of life.  Prognosis: good    Goals  Plan Goals: STGs x 2 wks  1. Demonstrates compliance with initial HEP  2. Pain at rest < 3/10 and with ADLs <7/10  3. Review body mechanics and joint protection principles with ADLs (initiated at eval)  4. Ambulates level surface and 4-6 inch steps with min to no antalgia    LTGs x 6 wks  1. Independent with HEP and joint protection  2. Min to no remaining tenderness knee and hip  3. Ambulates 5 min and flight of stairs (6-8 inch  steps) with normal gait  4. Improved function per LEFS score of 25% or <    Plan  Therapy options: will be seen for skilled therapy services  Planned modality interventions: cryotherapy, ultrasound and electrical stimulation/Russian stimulation  Planned therapy interventions: neuromuscular re-education, therapeutic activities, stretching, strengthening, home exercise program and functional ROM exercises  Frequency: 2x week  Duration in weeks: 6  Treatment plan discussed with: patient        History # of Personal Factors and/or Comorbidities: MODERATE (1-2)  Examination of Body System(s): # of elements: LOW (1-2)  Clinical Presentation: STABLE   Clinical Decision Making: LOW       Timed:         Manual Therapy:    0     mins  30543;     Therapeutic Exercise:    15     mins  69150;     Neuromuscular Fernando:    0    mins  92378;    Therapeutic Activity:     10     mins  26160;     Gait Trainin     mins  53533;     Ultrasound:     0     mins  74731;    Ionto                               0    mins   52418  Self Care                       0     mins   53673      Un-Timed:  Electrical Stimulation:    0     mins  32483 (MC );  Dry Needling     0     mins self-pay  Traction     0     mins 89766  Low Eval     22     Mins  63882  Mod Eval     0     Mins  46390  High Eval                       0     Mins  62160  Canalith Repos    0     mins 08653      Timed Treatment:   25   mins   Total Treatment:     47   mins          PT: Jessy Arellano PT     License Number: [id number]  Electronically signed by Jessy Arellano PT, 25, 9:04 AM EDT    Certification Period: 2025 thru 10/26/2025  I certify that the therapy services are furnished while this patient is under my care.  The services outlined above are required by this patient, and will be reviewed every 90 days.         Physician Signature:__________________________________________________    PHYSICIAN: Morenita Bates DO  NPI: [id number]                                       DATE:      Please sign and return via fax to [email or web address] . Thank you, [facility].

## 2025-08-08 ENCOUNTER — TREATMENT (OUTPATIENT)
Dept: PHYSICAL THERAPY | Facility: CLINIC | Age: 70
End: 2025-08-08
Payer: MEDICARE

## 2025-08-08 DIAGNOSIS — G89.29 CHRONIC PAIN OF RIGHT KNEE: Primary | ICD-10-CM

## 2025-08-08 DIAGNOSIS — M25.551 GREATER TROCHANTERIC PAIN SYNDROME OF RIGHT LOWER EXTREMITY: ICD-10-CM

## 2025-08-08 DIAGNOSIS — M17.11 PRIMARY OSTEOARTHRITIS OF RIGHT KNEE: ICD-10-CM

## 2025-08-08 DIAGNOSIS — R29.898 WEAKNESS OF RIGHT HIP: ICD-10-CM

## 2025-08-08 DIAGNOSIS — M70.51 PES ANSERINUS BURSITIS OF RIGHT KNEE: ICD-10-CM

## 2025-08-08 DIAGNOSIS — M25.561 CHRONIC PAIN OF RIGHT KNEE: Primary | ICD-10-CM

## 2025-08-08 PROCEDURE — 97530 THERAPEUTIC ACTIVITIES: CPT

## 2025-08-08 PROCEDURE — 97110 THERAPEUTIC EXERCISES: CPT

## 2025-08-15 ENCOUNTER — TREATMENT (OUTPATIENT)
Dept: PHYSICAL THERAPY | Facility: CLINIC | Age: 70
End: 2025-08-15
Payer: MEDICARE

## 2025-08-15 DIAGNOSIS — M70.51 PES ANSERINUS BURSITIS OF RIGHT KNEE: ICD-10-CM

## 2025-08-15 DIAGNOSIS — M17.11 PRIMARY OSTEOARTHRITIS OF RIGHT KNEE: Primary | ICD-10-CM

## 2025-08-15 DIAGNOSIS — G89.29 CHRONIC PAIN OF RIGHT KNEE: ICD-10-CM

## 2025-08-15 DIAGNOSIS — M25.561 CHRONIC PAIN OF RIGHT KNEE: ICD-10-CM

## 2025-08-15 DIAGNOSIS — R29.898 WEAKNESS OF RIGHT HIP: ICD-10-CM

## 2025-08-15 DIAGNOSIS — M25.551 GREATER TROCHANTERIC PAIN SYNDROME OF RIGHT LOWER EXTREMITY: ICD-10-CM

## 2025-08-15 PROCEDURE — 97112 NEUROMUSCULAR REEDUCATION: CPT

## 2025-08-15 PROCEDURE — 97530 THERAPEUTIC ACTIVITIES: CPT

## 2025-08-15 PROCEDURE — 97110 THERAPEUTIC EXERCISES: CPT

## 2025-08-19 ENCOUNTER — TREATMENT (OUTPATIENT)
Dept: PHYSICAL THERAPY | Facility: CLINIC | Age: 70
End: 2025-08-19
Payer: MEDICARE

## 2025-08-19 DIAGNOSIS — G89.29 CHRONIC PAIN OF RIGHT KNEE: ICD-10-CM

## 2025-08-19 DIAGNOSIS — M25.551 GREATER TROCHANTERIC PAIN SYNDROME OF RIGHT LOWER EXTREMITY: ICD-10-CM

## 2025-08-19 DIAGNOSIS — M70.51 PES ANSERINUS BURSITIS OF RIGHT KNEE: ICD-10-CM

## 2025-08-19 DIAGNOSIS — M17.11 PRIMARY OSTEOARTHRITIS OF RIGHT KNEE: Primary | ICD-10-CM

## 2025-08-19 DIAGNOSIS — R29.898 WEAKNESS OF RIGHT HIP: ICD-10-CM

## 2025-08-19 DIAGNOSIS — M25.561 CHRONIC PAIN OF RIGHT KNEE: ICD-10-CM

## 2025-08-19 PROCEDURE — 97110 THERAPEUTIC EXERCISES: CPT

## 2025-08-19 PROCEDURE — 97112 NEUROMUSCULAR REEDUCATION: CPT

## 2025-08-19 PROCEDURE — 97530 THERAPEUTIC ACTIVITIES: CPT

## 2025-08-21 ENCOUNTER — TREATMENT (OUTPATIENT)
Dept: PHYSICAL THERAPY | Facility: CLINIC | Age: 70
End: 2025-08-21
Payer: MEDICARE

## 2025-08-21 DIAGNOSIS — M70.51 PES ANSERINUS BURSITIS OF RIGHT KNEE: ICD-10-CM

## 2025-08-21 DIAGNOSIS — R29.898 WEAKNESS OF RIGHT HIP: ICD-10-CM

## 2025-08-21 DIAGNOSIS — M25.561 CHRONIC PAIN OF RIGHT KNEE: ICD-10-CM

## 2025-08-21 DIAGNOSIS — M25.551 GREATER TROCHANTERIC PAIN SYNDROME OF RIGHT LOWER EXTREMITY: ICD-10-CM

## 2025-08-21 DIAGNOSIS — G89.29 CHRONIC PAIN OF RIGHT KNEE: ICD-10-CM

## 2025-08-21 DIAGNOSIS — M17.11 PRIMARY OSTEOARTHRITIS OF RIGHT KNEE: Primary | ICD-10-CM

## 2025-08-21 PROCEDURE — 97110 THERAPEUTIC EXERCISES: CPT

## 2025-08-21 PROCEDURE — 97112 NEUROMUSCULAR REEDUCATION: CPT

## 2025-08-25 ENCOUNTER — TREATMENT (OUTPATIENT)
Dept: PHYSICAL THERAPY | Facility: CLINIC | Age: 70
End: 2025-08-25
Payer: MEDICARE

## 2025-08-25 DIAGNOSIS — M17.11 PRIMARY OSTEOARTHRITIS OF RIGHT KNEE: Primary | ICD-10-CM

## 2025-08-25 DIAGNOSIS — M25.561 CHRONIC PAIN OF RIGHT KNEE: ICD-10-CM

## 2025-08-25 DIAGNOSIS — G89.29 CHRONIC PAIN OF RIGHT KNEE: ICD-10-CM

## 2025-08-25 DIAGNOSIS — M70.51 PES ANSERINUS BURSITIS OF RIGHT KNEE: ICD-10-CM

## 2025-08-25 DIAGNOSIS — M25.551 GREATER TROCHANTERIC PAIN SYNDROME OF RIGHT LOWER EXTREMITY: ICD-10-CM

## 2025-08-25 DIAGNOSIS — R29.898 WEAKNESS OF RIGHT HIP: ICD-10-CM

## 2025-08-25 PROCEDURE — 97112 NEUROMUSCULAR REEDUCATION: CPT

## 2025-08-25 PROCEDURE — 97110 THERAPEUTIC EXERCISES: CPT

## 2025-08-26 ENCOUNTER — OFFICE VISIT (OUTPATIENT)
Dept: FAMILY MEDICINE CLINIC | Facility: CLINIC | Age: 70
End: 2025-08-26
Payer: MEDICARE

## 2025-08-26 VITALS
SYSTOLIC BLOOD PRESSURE: 158 MMHG | OXYGEN SATURATION: 97 % | DIASTOLIC BLOOD PRESSURE: 94 MMHG | BODY MASS INDEX: 29.29 KG/M2 | HEIGHT: 67 IN | HEART RATE: 66 BPM | WEIGHT: 186.6 LBS | TEMPERATURE: 97.8 F

## 2025-08-26 DIAGNOSIS — I10 ESSENTIAL HYPERTENSION: Primary | ICD-10-CM

## 2025-08-26 DIAGNOSIS — E78.2 MIXED HYPERLIPIDEMIA: ICD-10-CM

## 2025-08-26 DIAGNOSIS — Z12.11 SCREEN FOR COLON CANCER: ICD-10-CM

## 2025-08-26 PROCEDURE — 1160F RVW MEDS BY RX/DR IN RCRD: CPT | Performed by: NURSE PRACTITIONER

## 2025-08-26 PROCEDURE — 3077F SYST BP >= 140 MM HG: CPT | Performed by: NURSE PRACTITIONER

## 2025-08-26 PROCEDURE — 3080F DIAST BP >= 90 MM HG: CPT | Performed by: NURSE PRACTITIONER

## 2025-08-26 PROCEDURE — 1125F AMNT PAIN NOTED PAIN PRSNT: CPT | Performed by: NURSE PRACTITIONER

## 2025-08-26 PROCEDURE — 99214 OFFICE O/P EST MOD 30 MIN: CPT | Performed by: NURSE PRACTITIONER

## 2025-08-26 PROCEDURE — G2211 COMPLEX E/M VISIT ADD ON: HCPCS | Performed by: NURSE PRACTITIONER

## 2025-08-26 PROCEDURE — 3044F HG A1C LEVEL LT 7.0%: CPT | Performed by: NURSE PRACTITIONER

## 2025-08-26 PROCEDURE — 1159F MED LIST DOCD IN RCRD: CPT | Performed by: NURSE PRACTITIONER

## 2025-08-28 ENCOUNTER — TREATMENT (OUTPATIENT)
Dept: PHYSICAL THERAPY | Facility: CLINIC | Age: 70
End: 2025-08-28
Payer: MEDICARE

## 2025-08-28 DIAGNOSIS — G89.29 CHRONIC PAIN OF RIGHT KNEE: Primary | ICD-10-CM

## 2025-08-28 DIAGNOSIS — M70.51 PES ANSERINUS BURSITIS OF RIGHT KNEE: ICD-10-CM

## 2025-08-28 DIAGNOSIS — M17.11 PRIMARY OSTEOARTHRITIS OF RIGHT KNEE: ICD-10-CM

## 2025-08-28 DIAGNOSIS — R29.898 WEAKNESS OF RIGHT HIP: ICD-10-CM

## 2025-08-28 DIAGNOSIS — M25.561 CHRONIC PAIN OF RIGHT KNEE: Primary | ICD-10-CM

## 2025-08-28 DIAGNOSIS — M25.551 GREATER TROCHANTERIC PAIN SYNDROME OF RIGHT LOWER EXTREMITY: ICD-10-CM

## 2025-08-28 PROCEDURE — 97110 THERAPEUTIC EXERCISES: CPT

## 2025-08-28 PROCEDURE — 97112 NEUROMUSCULAR REEDUCATION: CPT

## (undated) DEVICE — 3M™ STERI-STRIP™ REINFORCED ADHESIVE SKIN CLOSURES, R1547, 1/2 IN X 4 IN (12 MM X 100 MM), 6 STRIPS/ENVELOPE: Brand: 3M™ STERI-STRIP™

## (undated) DEVICE — THE SINGLE USE ETRAP – POLYP TRAP IS USED FOR SUCTION RETRIEVAL OF ENDOSCOPICALLY REMOVED POLYPS.: Brand: ETRAP

## (undated) DEVICE — SENSR O2 OXIMAX FNGR A/ 18IN NONSTR

## (undated) DEVICE — ENDOPATH XCEL BLADELESS TROCARS WITH STABILITY SLEEVES: Brand: ENDOPATH XCEL

## (undated) DEVICE — LN SMPL CO2 SHTRM SD STREAM W/M LUER

## (undated) DEVICE — CANN O2 ETCO2 FITS ALL CONN CO2 SMPL A/ 7IN DISP LF

## (undated) DEVICE — DRAPE,REIN 53X77,STERILE: Brand: MEDLINE

## (undated) DEVICE — LAPAROSCOPIC SMOKE ELIMINATION DEVICE: Brand: PNEUVIEW XE

## (undated) DEVICE — TROCAR SITE CLOSURE DEVICE: Brand: ENDO CLOSE

## (undated) DEVICE — 3M™ STERI-STRIP™ COMPOUND BENZOIN TINCTURE 40 BAGS/CARTON 4 CARTONS/CASE C1544: Brand: 3M™ STERI-STRIP™

## (undated) DEVICE — OBT BLADLES DAVINCI/S LNG 8MM

## (undated) DEVICE — MARKR SKIN W/RULR AND LBL

## (undated) DEVICE — SUT PROLN 2/0 CT2 30IN 8411H

## (undated) DEVICE — TBG 02 CRUSH RESIST LF CLR 7FT

## (undated) DEVICE — KT ORCA ORCAPOD DISP STRL

## (undated) DEVICE — BNDR ABD PREMIUM/UNIV 10IN 27TO48IN

## (undated) DEVICE — CANN NASL CO2 TRULINK W/O2 A/

## (undated) DEVICE — APPL CHLORAPREP W/TINT 26ML ORNG

## (undated) DEVICE — GLV SURG BIOGEL LTX PF 7 1/2

## (undated) DEVICE — ADHS SKIN DERMABOND PROPEN

## (undated) DEVICE — SWABSTCK BENZOIN TINCTURE

## (undated) DEVICE — Device

## (undated) DEVICE — OBT BLADLES ENDOWRIST DAVINCI/S 8MM

## (undated) DEVICE — ADAPT CLN BIOGUARD AIR/H2O DISP

## (undated) DEVICE — TUBING, SUCTION, 1/4" X 10', STRAIGHT: Brand: MEDLINE

## (undated) DEVICE — SINGLE-USE BIOPSY FORCEPS: Brand: RADIAL JAW 4

## (undated) DEVICE — DEV SUT GRSPR CLOSUR 15CM 14G

## (undated) DEVICE — MONOPOLAR METZENBAUM SCISSOR TIP, DISPOSABLE: Brand: MONOPOLAR METZENBAUM SCISSOR TIP, DISPOSABLE

## (undated) DEVICE — SOL ANTISTICK CAUTRY ELECTROLUBE LF

## (undated) DEVICE — TIP COVER ACCESSORY

## (undated) DEVICE — STPLR SKIN VISISTAT WD 35CT

## (undated) DEVICE — LOU LAP CHOLE: Brand: MEDLINE INDUSTRIES, INC.

## (undated) DEVICE — 1842 FOAM BLOCK NEEDLE COUNTER: Brand: DEVON

## (undated) DEVICE — FRCP BX RADJAW4 NDL 2.8 240CM LG OG BX40

## (undated) DEVICE — ADHS SKIN DERMABOND TOP ADVANCED

## (undated) DEVICE — SNAR POLYP SENSATION STDOVL 27 240 BX40

## (undated) DEVICE — BITEBLOCK OMNI BLOC

## (undated) DEVICE — THE TORRENT IRRIGATION SCOPE CONNECTOR IS USED WITH THE TORRENT IRRIGATION TUBING TO PROVIDE IRRIGATION FLUIDS SUCH AS STERILE WATER DURING GASTROINTESTINAL ENDOSCOPIC PROCEDURES WHEN USED IN CONJUNCTION WITH AN IRRIGATION PUMP (OR ELECTROSURGICAL UNIT).: Brand: TORRENT

## (undated) DEVICE — Device: Brand: DEFENDO AIR/WATER/SUCTION AND BIOPSY VALVE

## (undated) DEVICE — VISUALIZATION SYSTEM: Brand: CLEARIFY